# Patient Record
Sex: FEMALE | Race: WHITE | NOT HISPANIC OR LATINO | Employment: OTHER | URBAN - METROPOLITAN AREA
[De-identification: names, ages, dates, MRNs, and addresses within clinical notes are randomized per-mention and may not be internally consistent; named-entity substitution may affect disease eponyms.]

---

## 2017-01-17 ENCOUNTER — HOSPITAL ENCOUNTER (OUTPATIENT)
Dept: LAB | Facility: MEDICAL CENTER | Age: 77
End: 2017-01-17
Attending: INTERNAL MEDICINE
Payer: MEDICARE

## 2017-01-17 DIAGNOSIS — Z00.00 HEALTH CARE MAINTENANCE: ICD-10-CM

## 2017-01-17 DIAGNOSIS — E03.9 HYPOTHYROIDISM: Chronic | ICD-10-CM

## 2017-01-17 LAB
25(OH)D3 SERPL-MCNC: 33 NG/ML (ref 30–100)
ALBUMIN SERPL BCP-MCNC: 4.2 G/DL (ref 3.2–4.9)
ALBUMIN/GLOB SERPL: 1.5 G/DL
ALP SERPL-CCNC: 48 U/L (ref 30–99)
ALT SERPL-CCNC: 13 U/L (ref 2–50)
ANION GAP SERPL CALC-SCNC: 5 MMOL/L (ref 0–11.9)
AST SERPL-CCNC: 15 U/L (ref 12–45)
BASOPHILS # BLD AUTO: 0.03 K/UL (ref 0–0.12)
BASOPHILS NFR BLD AUTO: 0.6 % (ref 0–1.8)
BILIRUB SERPL-MCNC: 0.5 MG/DL (ref 0.1–1.5)
BUN SERPL-MCNC: 14 MG/DL (ref 8–22)
CALCIUM SERPL-MCNC: 9.9 MG/DL (ref 8.5–10.5)
CHLORIDE SERPL-SCNC: 104 MMOL/L (ref 96–112)
CHOLEST SERPL-MCNC: 215 MG/DL (ref 100–199)
CO2 SERPL-SCNC: 29 MMOL/L (ref 20–33)
CREAT SERPL-MCNC: 1.05 MG/DL (ref 0.5–1.4)
EOSINOPHIL # BLD: 0.12 K/UL (ref 0–0.51)
EOSINOPHIL NFR BLD AUTO: 2.5 % (ref 0–6.9)
ERYTHROCYTE [DISTWIDTH] IN BLOOD BY AUTOMATED COUNT: 45.2 FL (ref 35.9–50)
GLOBULIN SER CALC-MCNC: 2.8 G/DL (ref 1.9–3.5)
GLUCOSE SERPL-MCNC: 107 MG/DL (ref 65–99)
HCT VFR BLD AUTO: 45.2 % (ref 37–47)
HDLC SERPL-MCNC: 82 MG/DL
HGB BLD-MCNC: 14.4 G/DL (ref 12–16)
IMM GRANULOCYTES # BLD AUTO: 0.02 K/UL (ref 0–0.11)
IMM GRANULOCYTES NFR BLD AUTO: 0.4 % (ref 0–0.9)
LDLC SERPL CALC-MCNC: 120 MG/DL
LYMPHOCYTES # BLD: 1.15 K/UL (ref 1–4.8)
LYMPHOCYTES NFR BLD AUTO: 24.4 % (ref 22–41)
MCH RBC QN AUTO: 28.6 PG (ref 27–33)
MCHC RBC AUTO-ENTMCNC: 31.9 G/DL (ref 33.6–35)
MCV RBC AUTO: 89.7 FL (ref 81.4–97.8)
MONOCYTES # BLD: 0.31 K/UL (ref 0–0.85)
MONOCYTES NFR BLD AUTO: 6.6 % (ref 0–13.4)
NEUTROPHILS # BLD: 3.09 K/UL (ref 2–7.15)
NEUTROPHILS NFR BLD AUTO: 65.5 % (ref 44–72)
NRBC # BLD AUTO: 0 K/UL
NRBC BLD-RTO: 0 /100 WBC
PLATELET # BLD AUTO: 262 K/UL (ref 164–446)
PMV BLD AUTO: 10.1 FL (ref 9–12.9)
POTASSIUM SERPL-SCNC: 5.2 MMOL/L (ref 3.6–5.5)
PROT SERPL-MCNC: 7 G/DL (ref 6–8.2)
RBC # BLD AUTO: 5.04 M/UL (ref 4.2–5.4)
SODIUM SERPL-SCNC: 138 MMOL/L (ref 135–145)
TRIGL SERPL-MCNC: 64 MG/DL (ref 0–149)
TSH SERPL DL<=0.005 MIU/L-ACNC: 0.96 UIU/ML (ref 0.3–3.7)
WBC # BLD AUTO: 4.7 K/UL (ref 4.8–10.8)

## 2017-01-17 PROCEDURE — 84443 ASSAY THYROID STIM HORMONE: CPT

## 2017-01-17 PROCEDURE — 82306 VITAMIN D 25 HYDROXY: CPT

## 2017-01-17 PROCEDURE — 80061 LIPID PANEL: CPT

## 2017-01-17 PROCEDURE — 85025 COMPLETE CBC W/AUTO DIFF WBC: CPT

## 2017-01-17 PROCEDURE — 80053 COMPREHEN METABOLIC PANEL: CPT

## 2017-01-17 PROCEDURE — 36415 COLL VENOUS BLD VENIPUNCTURE: CPT

## 2017-01-18 ENCOUNTER — TELEPHONE (OUTPATIENT)
Dept: MEDICAL GROUP | Facility: PHYSICIAN GROUP | Age: 77
End: 2017-01-18

## 2017-01-18 NOTE — TELEPHONE ENCOUNTER
----- Message from Citlalli Lindsey M.D. sent at 1/18/2017  6:51 AM PST -----  Please let patient know her labs were reviewed by Dr. Lindsey. Her cholesterol is a little higher than last year and sugar was mildly elevated. Nothing critical. We can discuss it at her upcoming appointment.  Citlalli Lindsey M.D.

## 2017-01-18 NOTE — TELEPHONE ENCOUNTER
1. Caller Name: Bernie L Carlson                                         Call Back Number: [phone] (home)       Patient approves a detailed voicemail message: N\A    Sent letter to notify patient

## 2017-01-18 NOTE — Clinical Note
January 18, 2017        Bernie Barrientos  [address] [place] [address]        Dear Bernie    Here are the results of your test(s):   ----- Message from Citlalli Lindsey M.D. sent at 1/18/2017 6:51 AM PST -----   Please let patient know her labs were reviewed by Dr. Lindsey. Her cholesterol is a little higher than last year and sugar was mildly elevated. Nothing critical. We can discuss it at her upcoming appointment.   Citlalli Lindsey M.D        Sincerely,        Melida Baires  Signed Electronically

## 2017-01-27 ENCOUNTER — HOSPITAL ENCOUNTER (OUTPATIENT)
Dept: RADIOLOGY | Facility: MEDICAL CENTER | Age: 77
End: 2017-01-27
Attending: FAMILY MEDICINE
Payer: MEDICARE

## 2017-01-27 DIAGNOSIS — G89.29 CHRONIC PAIN OF RIGHT KNEE: ICD-10-CM

## 2017-01-27 DIAGNOSIS — M25.561 CHRONIC PAIN OF RIGHT KNEE: ICD-10-CM

## 2017-01-27 DIAGNOSIS — M54.50 ACUTE BILATERAL LOW BACK PAIN WITHOUT SCIATICA: ICD-10-CM

## 2017-01-27 PROCEDURE — 73562 X-RAY EXAM OF KNEE 3: CPT | Mod: RT

## 2017-01-27 PROCEDURE — 72100 X-RAY EXAM L-S SPINE 2/3 VWS: CPT

## 2017-01-30 ENCOUNTER — OFFICE VISIT (OUTPATIENT)
Dept: MEDICAL GROUP | Facility: PHYSICIAN GROUP | Age: 77
End: 2017-01-30
Payer: MEDICARE

## 2017-01-30 VITALS
RESPIRATION RATE: 12 BRPM | SYSTOLIC BLOOD PRESSURE: 140 MMHG | HEART RATE: 62 BPM | OXYGEN SATURATION: 95 % | WEIGHT: 154.1 LBS | TEMPERATURE: 97.5 F | HEIGHT: 69 IN | DIASTOLIC BLOOD PRESSURE: 74 MMHG | BODY MASS INDEX: 22.82 KG/M2

## 2017-01-30 DIAGNOSIS — R04.0 RIGHT-SIDED EPISTAXIS: ICD-10-CM

## 2017-01-30 DIAGNOSIS — M54.50 ACUTE BILATERAL LOW BACK PAIN WITHOUT SCIATICA: ICD-10-CM

## 2017-01-30 DIAGNOSIS — M17.11 OSTEOARTHRITIS OF RIGHT KNEE, UNSPECIFIED OSTEOARTHRITIS TYPE: ICD-10-CM

## 2017-01-30 PROCEDURE — G8419 CALC BMI OUT NRM PARAM NOF/U: HCPCS | Performed by: FAMILY MEDICINE

## 2017-01-30 PROCEDURE — 1036F TOBACCO NON-USER: CPT | Performed by: FAMILY MEDICINE

## 2017-01-30 PROCEDURE — 1101F PT FALLS ASSESS-DOCD LE1/YR: CPT | Performed by: FAMILY MEDICINE

## 2017-01-30 PROCEDURE — 4040F PNEUMOC VAC/ADMIN/RCVD: CPT | Performed by: FAMILY MEDICINE

## 2017-01-30 PROCEDURE — 99213 OFFICE O/P EST LOW 20 MIN: CPT | Mod: 25 | Performed by: FAMILY MEDICINE

## 2017-01-30 PROCEDURE — G8482 FLU IMMUNIZE ORDER/ADMIN: HCPCS | Performed by: FAMILY MEDICINE

## 2017-01-30 PROCEDURE — G8432 DEP SCR NOT DOC, RNG: HCPCS | Performed by: FAMILY MEDICINE

## 2017-01-30 PROCEDURE — 20610 DRAIN/INJ JOINT/BURSA W/O US: CPT | Performed by: FAMILY MEDICINE

## 2017-01-30 RX ORDER — LIDOCAINE HYDROCHLORIDE 20 MG/ML
5 INJECTION, SOLUTION EPIDURAL; INFILTRATION; INTRACAUDAL; PERINEURAL ONCE
Status: DISCONTINUED | OUTPATIENT
Start: 2017-01-30 | End: 2017-01-30 | Stop reason: HOSPADM

## 2017-01-30 RX ORDER — TRIAMCINOLONE ACETONIDE 40 MG/ML
80 INJECTION, SUSPENSION INTRA-ARTICULAR; INTRAMUSCULAR ONCE
Status: DISCONTINUED | OUTPATIENT
Start: 2017-01-30 | End: 2017-01-30 | Stop reason: HOSPADM

## 2017-01-30 NOTE — PROGRESS NOTES
"Subjective:   Bernie Barrientos is a 76 y.o. female here today for follow-up low back and right knee pain.    Acute bilateral low back pain without sciatica  Stable low back pain without significant change. She did notice improvement in her pain when she stopped walking due to the snow. No known injury or trauma. No leg symptoms. Denies fever, chills, groin numbness, bowel/bladder incontinence. We discussed her x-rays that show arthritis.    DJD (degenerative joint disease)  Stable, no changes. Her pain worsened when she saw \"[employer]\" and had to walk up multiple flights of stairs to her seat in the [facility]. She has had ongoing pain over the last several months in her right knee. No known injury or trauma. +Swelling. Painful to walk on. Patient is interested in a possible injection. She does not want surgery. We reviewed her x-rays that show arthritis.     Nosebleed  Patient reports that she has had a few episodes of nosebleed over the last several weeks. She has been using a Neti-Pot to help with a sinus infection. The bleeding is always from her right nostril. She has been able to stop that with pressure. Has not needed to go to the emergency room.    Current medicines (including changes today)  Current Outpatient Prescriptions   Medication Sig Dispense Refill   • Lactobacillus (PROBIOTIC ACIDOPHILUS PO) Take  by mouth.     • tizanidine (ZANAFLEX) 2 MG tablet Take 1 Tab by mouth at bedtime as needed (muscle spasm). 30 Tab 0   • levothyroxine (SYNTHROID) 100 MCG Tab Take 1 Tab by mouth every day. 90 Tab 3   • Multiple Vitamin (MULTI VITAMIN DAILY PO) Take  by mouth.     • Omega-3 Fatty Acids (OMEGA 3 PO) Take  by mouth.     • vitamin D (CHOLECALCIFEROL) 1000 UNIT Tab Take 1,000 Units by mouth every day.     • BIOTIN 5000 PO Take  by mouth.     • Magnesium 250 MG Tab Take  by mouth.     • Potassium 99 MG Tab Take  by mouth.     • aspirin 81 MG tablet Take 81 mg by mouth every day.     • Glucosamine HCl (GLUCOSAMINE PO) " "Take  by mouth.     • sertraline (ZOLOFT) 100 MG Tab Take 2 Tabs by mouth every day. 180 Tab 0     Current Facility-Administered Medications   Medication Dose Route Frequency Provider Last Rate Last Dose   • lidocaine PF (XYLOCAINE-MPF) 2 % injection PF 5 mL  5 mL Injection Once Citlalli Lindsey M.D.       • triamcinolone acetonide (KENALOG-40) injection 80 mg  80 mg Injection Once Citlalli Lindsey M.D.         She  has a past medical history of Colon polyps; Postmenopausal hormone replacement therapy; DJD (degenerative joint disease); Hypothyroidism; Migraine; Arthritis (2003); Heart valve disease (2005); Major depression (5/2/2013); Dental disorder; Anesthesia; and Colon polyp. She also has no past medical history of Backpain, COPD, Fall, or Breast cancer ([facility]).    ROS   See above. No chest pain, no shortness of breath, no abdominal pain.     Objective:     Physical Exam:  Blood pressure 140/74, pulse 62, temperature 36.4 °C (97.5 °F), resp. rate 12, height 1.753 m (5' 9.02\"), weight 69.9 kg (154 lb 1.6 oz), SpO2 95 %. Body mass index is 22.75 kg/(m^2).  Constitutional: Elderly female, alert, no distress.  Skin: Warm, dry, good turgor, no rashes in visible areas.  Eye: Conjunctiva clear, lids normal.  ENMT: Normal left nasal mucosa. Right nare with small abrasion on anterior aspect of septum. No current bleeding. Lips without lesions, good dentition, oropharynx clear.  Neck: Trachea midline, no masses, no thyromegaly.   Psych: Alert and oriented x3, normal affect and mood.    Spine: No significant spinal curvature on forward bend. Antalgic gait. Mild tenderness in right-sided paraspinal muscle. SLT negative. DTR 2+ patella, 1+ achilles bilaterally. Strength 5/5 proximal and distal LE.      Knees: Right knee mildly swollen when compared to left. Tenderness to palpation along joint line. Patellar grind test positive. Lachman's negative. Maite's negative. ROM intact. 5/5 strength bilaterally. 2+ deep tendon " reflexes bilaterally.    Assessment and Plan:     1. Acute bilateral low back pain without sciatica  Stable. Reviewed x-ray results with patient. I recommended physical therapy, but she declines at this time. Discussed home core exercises. I also provided her with local aquatic therapy information.    2. Osteoarthritis of right knee, unspecified osteoarthritis type  Stable. Patient does not wish to have surgery and is interested in a joint injection today. See procedure note below. We'll reevaluate in 4 months.    PROCEDURE NOTE:  Discussed risks and benefits and patient agrees to cortisone injection of right knee. The joint was prepped and draped in usual sterile fashion. A 25-guage needle was inserted into the anterior medial aspect of the joint. 2 cc of 2% lidocaine without epi and 2 cc of kenalog 40 was then injected into the joint. The area was cleaned with alcohol swab and band-aid was applied. Patient tolerated procedure well with no complications.    - Consent for Amb Surgery/Procedure  - lidocaine PF (XYLOCAINE-MPF) 2 % injection PF 5 mL; 5 mL by Injection route Once.  - triamcinolone acetonide (KENALOG-40) injection 80 mg; 2 mL by Injection route Once.    3. Right-sided epistaxis  This is a new problem. Mild episodes of epistaxis most likely due to local trauma from Neti-pot and dry climate. Advised patient to use humidifier and petroleum jelly.    Followup: Return in about 4 months (around 5/30/2017) for f/u right knee pain after injection, back pain, short.         PLEASE NOTE: This dictation was created using voice recognition software. I have made every reasonable attempt to correct obvious errors, but I expect that there are errors of grammar and possibly content that I did not discover before finalizing the note.

## 2017-01-30 NOTE — MR AVS SNAPSHOT
"        Bernie Barrientos   2017 10:40 AM   Office Visit   MRN: [id number]    Department:  [facility]   Dept Phone:  [phone]    Description:  Female : 1940   Provider:  Citlalli Lindsey M.D.           Reason for Visit     Follow-Up continued back and knee pain       Allergies as of 2017     Allergen Noted Reactions    Naprosyn [Naproxen] 2009   Rash    Pcn [Penicillins] 2009       Reaction when she was a child       You were diagnosed with     Acute bilateral low back pain without sciatica   [3729887]       Osteoarthritis of right knee, unspecified osteoarthritis type   [9282278]         Vital Signs     Blood Pressure Pulse Temperature Respirations Height Weight    140/74 mmHg 62 36.4 °C (97.5 °F) 12 1.753 m (5' 9.02\") 69.9 kg (154 lb 1.6 oz)    Body Mass Index Oxygen Saturation Smoking Status             22.75 kg/m2 95% Former Smoker         Basic Information     Date Of Birth Sex Race Ethnicity Preferred Language    1940 Female White Non- English      Your appointments     2017 12:30 PM   BONE DENSITY (DEXASCAN) with [facility] BD 1   [facility] ([address])    [address] [place] [id number] [phone]           No calcium supplements 24 hours prior to exam, including antacids or multivitamins w/calcium.  Pt may eat and drink normally.  No injection procedures prior to Dexa on the same day.  No barium contrast, no CTs with IV or oral contrast, no Pet/CTs and no Nuc Med injections for 7 days prior to a Dexa (including Barium Swallow, Upper GI, Small Bowel Series).  If scheduling a Dexa on the same day as a CT with IV or oral contrast, any test with barium, Pet/CT or a Nuc Med with injection, always schedule the Dexa before the other study.            2017  1:10 PM   MA SCRN10 with [facility] MG 3   [facility] ([address])    [address]  [facility] [id number] [phone]           No " deodorant, powder, perfume or lotion under the arm or breast area.            May 30, 2017 11:00 AM   Established Patient with Citlalli Lindsey M.D.   [facility] (--)    [address]  [address]  [place] [address]   [phone]           You will be receiving a confirmation call a few days before your appointment from our automated call confirmation system.              Problem List              ICD-10-CM Priority Class Noted - Resolved    DJD (degenerative joint disease) M19.90 Low  Unknown - Present    Colon polyps K63.5 Low  Unknown - Present    Thyroid activity decreased (Chronic) E03.9   11/30/2015 - Present    CKD (chronic kidney disease) N18.9   11/30/2015 - Present    S/P bilateral hip replacements Z96.643   11/30/2015 - Present    Major depressive disorder, recurrent, in remission (CMS-HCC) F33.40   9/15/2016 - Present    Atypical mole D22.9   12/29/2016 - Present    Acute bilateral low back pain without sciatica M54.5   12/29/2016 - Present      Health Maintenance        Date Due Completion Dates    BONE DENSITY 2/17/2016 2/17/2011    IMM ZOSTER VACCINE 9/7/2017 (Originally 9/7/1999) ---    COLONOSCOPY 9/11/2024 9/11/2014 (N/S), 8/15/2013, 7/2/2013 (N/S)    Override on 9/11/2014: (N/S)    Override on 7/2/2013: (N/S)    IMM DTaP/Tdap/Td Vaccine (2 - Td) 12/4/2024 12/4/2014            Current Immunizations     13-VALENT PCV PREVNAR 9/15/2016    INFLUENZA VACCINE H1N1 1/2/2010    Influenza TIV (IM) 10/22/2014, 10/22/2014, 10/11/2013, 10/17/2012    Influenza Vaccine 10/21/2009    Influenza Vaccine Adult HD 10/31/2016    Influenza Vaccine Quad Inj (Pf) 10/15/2012    Pneumococcal Vaccine (UF)Historical Data 11/21/2007    Pneumococcal polysaccharide vaccine (PPSV-23) 10/22/2014    Tdap Vaccine 12/4/2014      Below and/or attached are the medications your provider expects you to take. Review all of your home medications and newly ordered medications with your provider and/or pharmacist. Follow  medication instructions as directed by your provider and/or pharmacist. Please keep your medication list with you and share with your provider. Update the information when medications are discontinued, doses are changed, or new medications (including over-the-counter products) are added; and carry medication information at all times in the event of emergency situations     Allergies:  NAPROSYN - Rash     PCN - (reactions not documented)               Medications  Valid as of: January 30, 2017 - 11:03 AM    Generic Name Brand Name Tablet Size Instructions for use    Aspirin (Tab) aspirin 81 MG Take 81 mg by mouth every day.        Biotin   Take  by mouth.        Cholecalciferol (Tab) cholecalciferol 1000 UNIT Take 1,000 Units by mouth every day.        Glucosamine HCl   Take  by mouth.        Lactobacillus   Take  by mouth.        Levothyroxine Sodium (Tab) SYNTHROID 100 MCG Take 1 Tab by mouth every day.        Magnesium (Tab) Magnesium 250 MG Take  by mouth.        Multiple Vitamin   Take  by mouth.        Omega-3 Fatty Acids   Take  by mouth.        Potassium (Tab) Potassium 99 MG Take  by mouth.        Sertraline HCl (Tab) ZOLOFT 100 MG Take 2 Tabs by mouth every day.        TiZANidine HCl (Tab) ZANAFLEX 2 MG Take 1 Tab by mouth at bedtime as needed (muscle spasm).        .                 Medicines prescribed today were sent to:     [facility] [address] [place] [address] (), [place] - [id number] () [place] [address]    Phone: [phone] Fax: [phone]    Open 24 Hours?: No      Medication refill instructions:       If your prescription bottle indicates you have medication refills left, it is not necessary to call your provider’s office. Please contact your pharmacy and they will refill your medication.    If your prescription bottle indicates you do not have any refills left, you may request refills at any time through one of the following ways: The online [employer] system (except Urgent  Care), by calling your provider’s office, or by asking your pharmacy to contact your provider’s office with a refill request. Medication refills are processed only during regular business hours and may not be available until the next business day. Your provider may request additional information or to have a follow-up visit with you prior to refilling your medication.   *Please Note: Medication refills are assigned a new Rx number when refilled electronically. Your pharmacy may indicate that no refills were authorized even though a new prescription for the same medication is available at the pharmacy. Please request the medicine by name with the pharmacy before contacting your provider for a refill.        Instructions    Knee Injection, Care After  Refer to this sheet in the next few weeks. These instructions provide you with information about caring for yourself after your procedure. Your health care provider may also give you more specific instructions. Your treatment has been planned according to current medical practices, but problems sometimes occur. Call your health care provider if you have any problems or questions after your procedure.  WHAT TO EXPECT AFTER THE PROCEDURE  After your procedure, it is common to have:  · Soreness.  · Warmth.  · Swelling.  You may have more pain, swelling, and warmth than you did before the injection. This reaction may last for about one day.   HOME CARE INSTRUCTIONS  Bathing  · If you were given a bandage (dressing), keep it dry until your health care provider says it can be removed. Ask your health care provider when you can start showering or taking a bath.   Managing Pain, Stiffness, and Swelling  · If directed, apply ice to the injection area:  ¨ Put ice in a plastic bag.  ¨ Place a towel between your skin and the bag.  ¨ Leave the ice on for 20 minutes, 2-3 times per day.  · Do not apply heat to your knee.  · Raise the injection area above the level of your heart while  you are sitting or lying down.  Activity  · Avoid strenuous activities for as long as directed by your health care provider. Ask your health care provider when you can return to your normal activities.   General Instructions  · Take medicines only as directed by your health care provider.  · Do not take aspirin or other over-the-counter medicines unless your health care provider says you can.  · Check your injection site every day for signs of infection. Watch for:  ¨ Redness, swelling, or pain.  ¨ Fluid, blood, or pus.  · Follow your health care provider's instructions about dressing changes and removal.  SEEK MEDICAL CARE IF:  · You have symptoms at your injection site that last longer than two days after your procedure.  · You have redness, swelling, or pain in your injection area.  · You have fluid, blood, or pus coming from your injection site.  · You have warmth in your injection area.  · You have a fever.  · Your pain is not controlled with medicine.  SEEK IMMEDIATE MEDICAL CARE IF:  · Your knee turns very red.  · Your knee becomes very swollen.  · Your knee pain is severe.     This information is not intended to replace advice given to you by your health care provider. Make sure you discuss any questions you have with your health care provider.     Document Released: 01/08/2016 Document Reviewed: 01/08/2016  [employer] Interactive Patient Education ©2016 [employer]            Push IOt Access Code: Activation code not generated  Current [employer] Status: Active

## 2017-01-30 NOTE — ASSESSMENT & PLAN NOTE
"Stable, no changes. Her pain worsened when she saw \"[employer]\" and had to walk up multiple flights of stairs to her seat in the [facility]. She has had ongoing pain over the last several months in her right knee. No known injury or trauma. +Swelling. Painful to walk on. Patient is interested in a possible injection. She does not want surgery. We reviewed her x-rays that show arthritis.  "

## 2017-01-30 NOTE — ASSESSMENT & PLAN NOTE
Stable low back pain without significant change. She did notice improvement in her pain when she stopped walking due to the snow. No known injury or trauma. No leg symptoms. Denies fever, chills, groin numbness, bowel/bladder incontinence. We discussed her x-rays that show arthritis.

## 2017-01-30 NOTE — PATIENT INSTRUCTIONS
Knee Injection, Care After  Refer to this sheet in the next few weeks. These instructions provide you with information about caring for yourself after your procedure. Your health care provider may also give you more specific instructions. Your treatment has been planned according to current medical practices, but problems sometimes occur. Call your health care provider if you have any problems or questions after your procedure.  WHAT TO EXPECT AFTER THE PROCEDURE  After your procedure, it is common to have:  · Soreness.  · Warmth.  · Swelling.  You may have more pain, swelling, and warmth than you did before the injection. This reaction may last for about one day.   HOME CARE INSTRUCTIONS  Bathing  · If you were given a bandage (dressing), keep it dry until your health care provider says it can be removed. Ask your health care provider when you can start showering or taking a bath.   Managing Pain, Stiffness, and Swelling  · If directed, apply ice to the injection area:  ¨ Put ice in a plastic bag.  ¨ Place a towel between your skin and the bag.  ¨ Leave the ice on for 20 minutes, 2-3 times per day.  · Do not apply heat to your knee.  · Raise the injection area above the level of your heart while you are sitting or lying down.  Activity  · Avoid strenuous activities for as long as directed by your health care provider. Ask your health care provider when you can return to your normal activities.   General Instructions  · Take medicines only as directed by your health care provider.  · Do not take aspirin or other over-the-counter medicines unless your health care provider says you can.  · Check your injection site every day for signs of infection. Watch for:  ¨ Redness, swelling, or pain.  ¨ Fluid, blood, or pus.  · Follow your health care provider's instructions about dressing changes and removal.  SEEK MEDICAL CARE IF:  · You have symptoms at your injection site that last longer than two days after your  procedure.  · You have redness, swelling, or pain in your injection area.  · You have fluid, blood, or pus coming from your injection site.  · You have warmth in your injection area.  · You have a fever.  · Your pain is not controlled with medicine.  SEEK IMMEDIATE MEDICAL CARE IF:  · Your knee turns very red.  · Your knee becomes very swollen.  · Your knee pain is severe.     This information is not intended to replace advice given to you by your health care provider. Make sure you discuss any questions you have with your health care provider.     Document Released: 01/08/2016 Document Reviewed: 01/08/2016  [employer] Interactive Patient Education ©2016 [employer]

## 2017-02-21 ENCOUNTER — HOSPITAL ENCOUNTER (OUTPATIENT)
Dept: RADIOLOGY | Facility: MEDICAL CENTER | Age: 77
End: 2017-02-21
Attending: FAMILY MEDICINE
Payer: MEDICARE

## 2017-02-21 DIAGNOSIS — F33.0 MAJOR DEPRESSIVE DISORDER, RECURRENT EPISODE, MILD (HCC): ICD-10-CM

## 2017-02-21 DIAGNOSIS — Z78.0 POSTMENOPAUSAL: ICD-10-CM

## 2017-02-21 PROCEDURE — 77063 BREAST TOMOSYNTHESIS BI: CPT

## 2017-02-21 PROCEDURE — 77080 DXA BONE DENSITY AXIAL: CPT

## 2017-02-21 RX ORDER — SERTRALINE HYDROCHLORIDE 100 MG/1
200 TABLET, FILM COATED ORAL DAILY
Qty: 180 TAB | Refills: 3 | Status: SHIPPED | OUTPATIENT
Start: 2017-02-21 | End: 2018-03-12 | Stop reason: SDUPTHER

## 2017-06-02 ENCOUNTER — OFFICE VISIT (OUTPATIENT)
Dept: MEDICAL GROUP | Facility: PHYSICIAN GROUP | Age: 77
End: 2017-06-02
Payer: MEDICARE

## 2017-06-02 VITALS
SYSTOLIC BLOOD PRESSURE: 122 MMHG | TEMPERATURE: 99.7 F | OXYGEN SATURATION: 92 % | HEART RATE: 72 BPM | RESPIRATION RATE: 12 BRPM | DIASTOLIC BLOOD PRESSURE: 56 MMHG | BODY MASS INDEX: 23.71 KG/M2 | WEIGHT: 160.05 LBS | HEIGHT: 69 IN

## 2017-06-02 DIAGNOSIS — M54.50 ACUTE BILATERAL LOW BACK PAIN WITHOUT SCIATICA: ICD-10-CM

## 2017-06-02 DIAGNOSIS — R73.01 ELEVATED FASTING GLUCOSE: ICD-10-CM

## 2017-06-02 DIAGNOSIS — E78.00 PURE HYPERCHOLESTEROLEMIA: ICD-10-CM

## 2017-06-02 DIAGNOSIS — M17.11 OSTEOARTHRITIS OF RIGHT KNEE, UNSPECIFIED OSTEOARTHRITIS TYPE: ICD-10-CM

## 2017-06-02 DIAGNOSIS — E03.9 ACQUIRED HYPOTHYROIDISM: ICD-10-CM

## 2017-06-02 DIAGNOSIS — J30.1 SEASONAL ALLERGIC RHINITIS DUE TO POLLEN: ICD-10-CM

## 2017-06-02 PROCEDURE — 1101F PT FALLS ASSESS-DOCD LE1/YR: CPT | Performed by: FAMILY MEDICINE

## 2017-06-02 PROCEDURE — 1036F TOBACCO NON-USER: CPT | Performed by: FAMILY MEDICINE

## 2017-06-02 PROCEDURE — G8420 CALC BMI NORM PARAMETERS: HCPCS | Performed by: FAMILY MEDICINE

## 2017-06-02 PROCEDURE — 4040F PNEUMOC VAC/ADMIN/RCVD: CPT | Performed by: FAMILY MEDICINE

## 2017-06-02 PROCEDURE — G8432 DEP SCR NOT DOC, RNG: HCPCS | Performed by: FAMILY MEDICINE

## 2017-06-02 PROCEDURE — 99213 OFFICE O/P EST LOW 20 MIN: CPT | Performed by: FAMILY MEDICINE

## 2017-06-02 NOTE — PROGRESS NOTES
Subjective:   Bernie Barrientos is a 76 y.o. female here today for follow-up knee and back pain.    DJD (degenerative joint disease)  Stable. Pain improved with glucosamine and collagen. Not keeping her from her daily activities.    Acute bilateral low back pain without sciatica  Stable. Pain controlled with OTC pain relievers. No new injury or trauma.    Seasonal allergic rhinitis due to pollen  Has been feeling more tired lately. Noticing nasal congestion, sore throat, cough and phlegm in the back of her throat. No blood in stool or snoring.     Current medicines (including changes today)  Current Outpatient Prescriptions   Medication Sig Dispense Refill   • Collagen 500 MG Cap Take 500 mg by mouth every day.     • sertraline (ZOLOFT) 100 MG Tab Take 2 Tabs by mouth every day. 180 Tab 3   • Lactobacillus (PROBIOTIC ACIDOPHILUS PO) Take  by mouth.     • tizanidine (ZANAFLEX) 2 MG tablet Take 1 Tab by mouth at bedtime as needed (muscle spasm). 30 Tab 0   • levothyroxine (SYNTHROID) 100 MCG Tab Take 1 Tab by mouth every day. 90 Tab 3   • Multiple Vitamin (MULTI VITAMIN DAILY PO) Take  by mouth.     • Omega-3 Fatty Acids (OMEGA 3 PO) Take  by mouth.     • vitamin D (CHOLECALCIFEROL) 1000 UNIT Tab Take 1,000 Units by mouth every day.     • BIOTIN 5000 PO Take  by mouth.     • Magnesium 250 MG Tab Take  by mouth.     • Potassium 99 MG Tab Take  by mouth.     • aspirin 81 MG tablet Take 81 mg by mouth every day.     • Glucosamine HCl (GLUCOSAMINE PO) Take  by mouth.       No current facility-administered medications for this visit.     She  has a past medical history of Colon polyps; Postmenopausal hormone replacement therapy; DJD (degenerative joint disease); Hypothyroidism; Migraine; Arthritis (2003); Heart valve disease (2005); Major depression (CMS-HCC) (5/2/2013); Dental disorder; Anesthesia; and Colon polyp. She also has no past medical history of Backpain, COPD, Fall, or Breast cancer (CMS-[facility]).    ROS   See HPI. No  "chest pain, no shortness of breath, no abdominal pain.     Objective:     Physical Exam:  Blood pressure 122/56, pulse 72, temperature 37.6 °C (99.7 °F), resp. rate 12, height 1.753 m (5' 9\"), weight 72.6 kg (160 lb 0.9 oz), SpO2 92 %. Body mass index is 23.63 kg/(m^2).   Constitutional: Alert, no distress.  Skin: Warm, dry, good turgor, no rashes in visible areas.  Eye: Equal, round and reactive, conjunctiva clear, lids normal.  ENMT: TM's clear bilaterally, lips without lesions, good dentition, oropharynx clear.  Neck: Trachea midline, no masses, no thyromegaly. No cervical or supraclavicular lymphadenopathy.  Respiratory: Unlabored respiratory effort, no cough.  Psych: Alert and oriented x3, normal affect and mood.    Spine: No significant spinal curvature on forward bend. Normal gait. Mild tenderness in right-sided paraspinal muscle. Strength 5/5 proximal and distal LE.      Knees: Right knee mildly swollen when compared to left. No tenderness to palpation along joint line. ROM intact. 5/5 strength bilaterally. 2+ deep tendon reflexes bilaterally.    Assessment and Plan:     1. Osteoarthritis of right knee, unspecified osteoarthritis type  Chronic and stable. Pain and function are reasonably well-controlled with over-the-counter supplements. Continue to monitor.    2. Acute bilateral low back pain without sciatica  Chronic and stable. Pain and function are reasonably well-controlled with over-the-counter pain relievers. Continue to monitor.    3. Seasonal allergic rhinitis due to pollen  Advised nasal saline and steroid sprays daily. OTC anti-histamines (Zyrtec) as needed. Encouraged allergen avoidence and environment modification when possible. If regular use not effective, may consider referral to allergist for immunotherapy.     4. Elevated fasting glucose  Per patient history. Fasting glucose ordered.  - COMP METABOLIC PANEL; Future    5. Acquired hypothyroidism  Continue thyroid medication. Instructed " patient to take on empty stomach with glass of water, 30 minutes prior to food or other medications. Labs as indicated.  - TSH; Future    6. Pure hypercholesterolemia  Well controlled with diet. Labs as indicated. Continue to monitor.  - LIPID PROFILE; Future    Followup: Return in about 7 months (around 1/2/2018) for f/u labs, routine check-up, short.         PLEASE NOTE: This dictation was created using voice recognition software. I have made every reasonable attempt to correct obvious errors, but I expect that there are errors of grammar and possibly content that I did not discover before finalizing the note.

## 2017-06-02 NOTE — ASSESSMENT & PLAN NOTE
Has been feeling more tired lately. Noticing nasal congestion, sore throat, cough and phlegm in the back of her throat. No blood in stool or snoring.

## 2017-06-02 NOTE — MR AVS SNAPSHOT
"        Bernie Barrientos   2017 1:40 PM   Office Visit   MRN: [id number]    Department:  [facility]   Dept Phone:  [phone]    Description:  Female : 1940   Provider:  Citlalli Lindsey M.D.           Reason for Visit     Knee Pain right knee, improved over the last week    Back Pain lower back      Allergies as of 2017     Allergen Noted Reactions    Naprosyn [Naproxen] 2009   Rash    Pcn [Penicillins] 2009       Reaction when she was a child       You were diagnosed with     Osteoarthritis of right knee, unspecified osteoarthritis type   [4139302]       Acute bilateral low back pain without sciatica   [2022767]       Seasonal allergic rhinitis due to pollen   [9622975]       Elevated fasting glucose   [302913]       Acquired hypothyroidism   [7253391]       Pure hypercholesterolemia   [272.0.ICD-9-CM]         Vital Signs     Blood Pressure Pulse Temperature Respirations Height Weight    122/56 mmHg 72 37.6 °C (99.7 °F) 12 1.753 m (5' 9\") 72.6 kg (160 lb 0.9 oz)    Body Mass Index Oxygen Saturation Smoking Status             23.63 kg/m2 92% Former Smoker         Basic Information     Date Of Birth Sex Race Ethnicity Preferred Language    1940 Female White Non- English      Your appointments     Colin 15, 2018  2:00 PM   Established Patient with Citlalli Linsdey M.D.   [facility] (--)    [address]  [address]  [facility] [id number] [phone]           You will be receiving a confirmation call a few days before your appointment from our automated call confirmation system.              Problem List              ICD-10-CM Priority Class Noted - Resolved    DJD (degenerative joint disease) M19.90 Low  Unknown - Present    Colon polyps K63.5 Low  Unknown - Present    Acquired hypothyroidism E03.9   2015 - Present    CKD (chronic kidney disease) N18.9   2015 - Present    S/P bilateral hip replacements Z96.643   2015 - Present    Major depressive " disorder, recurrent, in remission (CMS-[facility]) F33.40   9/15/2016 - Present    Atypical mole D22.9   12/29/2016 - Present    Acute bilateral low back pain without sciatica M54.5   12/29/2016 - Present    Seasonal allergic rhinitis due to pollen J30.1   6/2/2017 - Present    Elevated fasting glucose R73.01   6/2/2017 - Present    Pure hypercholesterolemia E78.00   6/2/2017 - Present      Health Maintenance        Date Due Completion Dates    IMM ZOSTER VACCINE 9/7/2017 (Originally 9/7/1999) ---    BONE DENSITY 2/21/2022 2/21/2017, 2/17/2011    COLONOSCOPY 9/11/2024 9/11/2014 (N/S), 8/15/2013, 7/2/2013 (N/S)    Override on 9/11/2014: (N/S)    Override on 7/2/2013: (N/S)    IMM DTaP/Tdap/Td Vaccine (2 - Td) 12/4/2024 12/4/2014            Current Immunizations     13-VALENT PCV PREVNAR 9/15/2016    INFLUENZA VACCINE H1N1 1/2/2010    Influenza TIV (IM) 10/22/2014, 10/22/2014, 10/11/2013, 10/17/2012    Influenza Vaccine 10/21/2009    Influenza Vaccine Adult HD 10/31/2016    Influenza Vaccine Quad Inj (Pf) 10/15/2012    Pneumococcal Vaccine (UF)Historical Data 11/21/2007    Pneumococcal polysaccharide vaccine (PPSV-23) 10/22/2014    Tdap Vaccine 12/4/2014      Below and/or attached are the medications your provider expects you to take. Review all of your home medications and newly ordered medications with your provider and/or pharmacist. Follow medication instructions as directed by your provider and/or pharmacist. Please keep your medication list with you and share with your provider. Update the information when medications are discontinued, doses are changed, or new medications (including over-the-counter products) are added; and carry medication information at all times in the event of emergency situations     Allergies:  NAPROSYN - Rash     PCN - (reactions not documented)               Medications  Valid as of: June 02, 2017 -  1:59 PM    Generic Name Brand Name Tablet Size Instructions for use    Aspirin (Tab) aspirin  81 MG Take 81 mg by mouth every day.        Biotin   Take  by mouth.        Cholecalciferol (Tab) cholecalciferol 1000 UNIT Take 1,000 Units by mouth every day.        Collagen (Cap) Collagen 500 MG Take 500 mg by mouth every day.        Glucosamine HCl   Take  by mouth.        Lactobacillus   Take  by mouth.        Levothyroxine Sodium (Tab) SYNTHROID 100 MCG Take 1 Tab by mouth every day.        Magnesium (Tab) Magnesium 250 MG Take  by mouth.        Multiple Vitamin   Take  by mouth.        Omega-3 Fatty Acids   Take  by mouth.        Potassium (Tab) Potassium 99 MG Take  by mouth.        Sertraline HCl (Tab) ZOLOFT 100 MG Take 2 Tabs by mouth every day.        TiZANidine HCl (Tab) ZANAFLEX 2 MG Take 1 Tab by mouth at bedtime as needed (muscle spasm).        .                 Medicines prescribed today were sent to:     [facility] [address] [place] [address] (), [place] - [id number] () [place] [address]    Phone: [phone] Fax: [phone]    Open 24 Hours?: No      Medication refill instructions:       If your prescription bottle indicates you have medication refills left, it is not necessary to call your provider’s office. Please contact your pharmacy and they will refill your medication.    If your prescription bottle indicates you do not have any refills left, you may request refills at any time through one of the following ways: The online [employer] system (except Urgent Care), by calling your provider’s office, or by asking your pharmacy to contact your provider’s office with a refill request. Medication refills are processed only during regular business hours and may not be available until the next business day. Your provider may request additional information or to have a follow-up visit with you prior to refilling your medication.   *Please Note: Medication refills are assigned a new Rx number when refilled electronically. Your pharmacy may indicate that no refills were authorized  even though a new prescription for the same medication is available at the pharmacy. Please request the medicine by name with the pharmacy before contacting your provider for a refill.        Your To Do List     Future Labs/Procedures Complete By Expires    COMP METABOLIC PANEL  As directed 6/3/2018    LIPID PROFILE  As directed 6/3/2018    TSH  As directed 6/3/2018         MyChart Access Code: Activation code not generated  Current PublikDemandt Status: Active

## 2017-07-03 ENCOUNTER — OFFICE VISIT (OUTPATIENT)
Dept: MEDICAL GROUP | Facility: PHYSICIAN GROUP | Age: 77
End: 2017-07-03
Payer: MEDICARE

## 2017-07-03 VITALS
BODY MASS INDEX: 23.51 KG/M2 | SYSTOLIC BLOOD PRESSURE: 108 MMHG | DIASTOLIC BLOOD PRESSURE: 62 MMHG | OXYGEN SATURATION: 95 % | TEMPERATURE: 98.6 F | HEART RATE: 64 BPM | WEIGHT: 158.73 LBS | RESPIRATION RATE: 12 BRPM | HEIGHT: 69 IN

## 2017-07-03 DIAGNOSIS — R06.02 SHORTNESS OF BREATH: ICD-10-CM

## 2017-07-03 DIAGNOSIS — F33.40 MAJOR DEPRESSIVE DISORDER, RECURRENT, IN REMISSION (HCC): ICD-10-CM

## 2017-07-03 DIAGNOSIS — R05.8 PRODUCTIVE COUGH: ICD-10-CM

## 2017-07-03 DIAGNOSIS — J22 ACUTE RESPIRATORY INFECTION: ICD-10-CM

## 2017-07-03 PROCEDURE — 99214 OFFICE O/P EST MOD 30 MIN: CPT | Performed by: FAMILY MEDICINE

## 2017-07-03 RX ORDER — LEVOFLOXACIN 500 MG/1
500 TABLET, FILM COATED ORAL DAILY
Qty: 5 TAB | Refills: 0 | Status: SHIPPED | OUTPATIENT
Start: 2017-07-03 | End: 2017-09-14

## 2017-07-03 RX ORDER — AZITHROMYCIN 250 MG/1
TABLET, FILM COATED ORAL
Qty: 6 TAB | Refills: 0 | Status: SHIPPED | OUTPATIENT
Start: 2017-07-03 | End: 2017-07-03

## 2017-07-03 RX ORDER — CODEINE PHOSPHATE/GUAIFENESIN 10-100MG/5
5 LIQUID (ML) ORAL 3 TIMES DAILY PRN
Qty: 120 ML | Refills: 0 | Status: SHIPPED
Start: 2017-07-03 | End: 2017-09-14

## 2017-07-03 RX ORDER — IPRATROPIUM BROMIDE AND ALBUTEROL SULFATE 2.5; .5 MG/3ML; MG/3ML
3 SOLUTION RESPIRATORY (INHALATION) ONCE
Status: COMPLETED | OUTPATIENT
Start: 2017-07-03 | End: 2017-07-03

## 2017-07-03 RX ADMIN — IPRATROPIUM BROMIDE AND ALBUTEROL SULFATE 3 ML: 2.5; .5 SOLUTION RESPIRATORY (INHALATION) at 15:16

## 2017-07-03 NOTE — ASSESSMENT & PLAN NOTE
Patient has had a productive cough for the last month. The sputum was pink-colored this morning which concerned her and prompted her to make an appointment. Only coughing up a drop or two at a time. She shows me a tissue that has one drop of brown-colored mucus.    Associated symptoms include fatigue, shortness of breath and muscle weakness. No fever or chills. Poor appetite, but tolerating PO. Having difficulty sleeping at night due to cough.    Former smoker. Has not smoked in several years. Weight is stable.

## 2017-07-03 NOTE — MR AVS SNAPSHOT
"        Bernie Barrientos   7/3/2017 3:00 PM   Office Visit   MRN: [id number]    Department:  [facility]   Dept Phone:  [phone]    Description:  Female : 1940   Provider:  Citlalli Lindsey M.D.           Reason for Visit     Cough d1ikwgn, productive     Shortness of Breath x3days    Extremity Weakness achy legs, x3days, worse today    Fatigue x3days       Allergies as of 7/3/2017     Allergen Noted Reactions    Naprosyn [Naproxen] 2009   Rash    Pcn [Penicillins] 2009       Reaction when she was a child       You were diagnosed with     Acute respiratory infection   [146346]       Productive cough   [364746]       Shortness of breath   [786.05.ICD-9-CM]       Major depressive disorder, recurrent, in remission (CMS-HCC)   [945182]         Vital Signs     Blood Pressure Pulse Temperature Respirations Height Weight    108/62 mmHg 64 37 °C (98.6 °F) 12 1.753 m (5' 9\") 72 kg (158 lb 11.7 oz)    Body Mass Index Oxygen Saturation Smoking Status             23.43 kg/m2 95% Former Smoker         Basic Information     Date Of Birth Sex Race Ethnicity Preferred Language    1940 Female White Non- English      Your appointments     Colin 15, 2018  2:00 PM   Established Patient with Citlalli Lindsey M.D.   [facility] (--)    [address]  [address]  [facility] [id number] [phone]           You will be receiving a confirmation call a few days before your appointment from our automated call confirmation system.              Problem List              ICD-10-CM Priority Class Noted - Resolved    DJD (degenerative joint disease) M19.90 Low  Unknown - Present    Colon polyps K63.5 Low  Unknown - Present    Acquired hypothyroidism E03.9   2015 - Present    CKD (chronic kidney disease) N18.9   2015 - Present    S/P bilateral hip replacements Z96.643   2015 - Present    Major depressive disorder, recurrent, in remission (CMS-HCC) F33.40   9/15/2016 - Present    Atypical " mole D22.9   12/29/2016 - Present    Acute bilateral low back pain without sciatica M54.5   12/29/2016 - Present    Seasonal allergic rhinitis due to pollen J30.1   6/2/2017 - Present    Elevated fasting glucose R73.01   6/2/2017 - Present    Pure hypercholesterolemia E78.00   6/2/2017 - Present    Productive cough R05   7/3/2017 - Present      Health Maintenance        Date Due Completion Dates    IMM ZOSTER VACCINE 9/7/2017 (Originally 9/7/1999) ---    IMM INFLUENZA (1) 9/1/2017 10/31/2016, 10/22/2014, 10/11/2013, 10/17/2012, 10/15/2012    BONE DENSITY 2/21/2022 2/21/2017, 2/17/2011    COLONOSCOPY 9/11/2024 9/11/2014 (N/S), 8/15/2013, 7/2/2013 (N/S)    Override on 9/11/2014: (N/S)    Override on 7/2/2013: (N/S)    IMM DTaP/Tdap/Td Vaccine (2 - Td) 12/4/2024 12/4/2014            Current Immunizations     13-VALENT PCV PREVNAR 9/15/2016    INFLUENZA VACCINE H1N1 1/2/2010    Influenza TIV (IM) 10/22/2014, 10/22/2014, 10/11/2013, 10/17/2012    Influenza Vaccine 10/21/2009    Influenza Vaccine Adult HD 10/31/2016    Influenza Vaccine Quad Inj (Pf) 10/15/2012    Pneumococcal Vaccine (UF)Historical Data 11/21/2007    Pneumococcal polysaccharide vaccine (PPSV-23) 10/22/2014    Tdap Vaccine 12/4/2014      Below and/or attached are the medications your provider expects you to take. Review all of your home medications and newly ordered medications with your provider and/or pharmacist. Follow medication instructions as directed by your provider and/or pharmacist. Please keep your medication list with you and share with your provider. Update the information when medications are discontinued, doses are changed, or new medications (including over-the-counter products) are added; and carry medication information at all times in the event of emergency situations     Allergies:  NAPROSYN - Rash     PCN - (reactions not documented)               Medications  Valid as of: July 03, 2017 -  3:18 PM    Generic Name Brand Name Tablet  Size Instructions for use    Aspirin (Tab) aspirin 81 MG Take 81 mg by mouth every day.        Azithromycin (Tab) ZITHROMAX 250 MG Take 2 tablets PO on day #1, then 1 tablet PO daily on days #2-5.        Biotin   Take  by mouth.        Cholecalciferol (Tab) cholecalciferol 1000 UNIT Take 1,000 Units by mouth every day.        Collagen (Cap) Collagen 500 MG Take 500 mg by mouth every day.        Glucosamine HCl   Take  by mouth.        Guaifenesin-Codeine (Syrup) TUSSI-ORGANIDIN -10 MG/5ML Take 5 mL by mouth 3 times a day as needed for Cough.        Lactobacillus   Take  by mouth.        Levothyroxine Sodium (Tab) SYNTHROID 100 MCG Take 1 Tab by mouth every day.        Magnesium (Tab) Magnesium 250 MG Take  by mouth.        Multiple Vitamin   Take  by mouth.        Omega-3 Fatty Acids   Take  by mouth.        Potassium (Tab) Potassium 99 MG Take  by mouth.        Sertraline HCl (Tab) ZOLOFT 100 MG Take 2 Tabs by mouth every day.        TiZANidine HCl (Tab) ZANAFLEX 2 MG Take 1 Tab by mouth at bedtime as needed (muscle spasm).        .                 Medicines prescribed today were sent to:     [facility] [address] [place] [address] (), [place] - [address]    [address] () [place] [address]    Phone: [phone] Fax: [phone]    Open 24 Hours?: No      Medication refill instructions:       If your prescription bottle indicates you have medication refills left, it is not necessary to call your provider’s office. Please contact your pharmacy and they will refill your medication.    If your prescription bottle indicates you do not have any refills left, you may request refills at any time through one of the following ways: The online [employer] system (except Urgent Care), by calling your provider’s office, or by asking your pharmacy to contact your provider’s office with a refill request. Medication refills are processed only during regular business hours and may not be available until the next business  day. Your provider may request additional information or to have a follow-up visit with you prior to refilling your medication.   *Please Note: Medication refills are assigned a new Rx number when refilled electronically. Your pharmacy may indicate that no refills were authorized even though a new prescription for the same medication is available at the pharmacy. Please request the medicine by name with the pharmacy before contacting your provider for a refill.        Your To Do List     Future Labs/Procedures Complete By Expires    DX-CHEST-2 VIEWS  As directed 1/3/2018         mPura Access Code: Activation code not generated  Current mPura Status: Active

## 2017-07-03 NOTE — PROGRESS NOTES
Subjective:   Bernie Barrientos is a 76 y.o. female here today for cough.    Productive cough  Patient has had a productive cough for the last month. The sputum was pink-colored this morning which concerned her and prompted her to make an appointment. Only coughing up a drop or two at a time. She shows me a tissue that has one drop of brown-colored mucus.    Associated symptoms include fatigue, shortness of breath and muscle weakness. No fever or chills. Poor appetite, but tolerating PO. Having difficulty sleeping at night due to cough.    Former smoker. Has not smoked in several years. Weight is stable.    Major depressive disorder, recurrent, in remission  Mood improved with current medication and therapy. Taking medications as prescribed without side effects. Patient denies SI/HI.     Current medicines (including changes today)  Current Outpatient Prescriptions   Medication Sig Dispense Refill   • azithromycin (ZITHROMAX) 250 MG Tab Take 2 tablets PO on day #1, then 1 tablet PO daily on days #2-5. 6 Tab 0   • guaifenesin-codeine (TUSSI-ORGANIDIN NR) 100-10 MG/5ML syrup Take 5 mL by mouth 3 times a day as needed for Cough. 120 mL 0   • Collagen 500 MG Cap Take 500 mg by mouth every day.     • sertraline (ZOLOFT) 100 MG Tab Take 2 Tabs by mouth every day. 180 Tab 3   • Lactobacillus (PROBIOTIC ACIDOPHILUS PO) Take  by mouth.     • tizanidine (ZANAFLEX) 2 MG tablet Take 1 Tab by mouth at bedtime as needed (muscle spasm). 30 Tab 0   • levothyroxine (SYNTHROID) 100 MCG Tab Take 1 Tab by mouth every day. 90 Tab 3   • Multiple Vitamin (MULTI VITAMIN DAILY PO) Take  by mouth.     • Omega-3 Fatty Acids (OMEGA 3 PO) Take  by mouth.     • vitamin D (CHOLECALCIFEROL) 1000 UNIT Tab Take 1,000 Units by mouth every day.     • BIOTIN 5000 PO Take  by mouth.     • Magnesium 250 MG Tab Take  by mouth.     • Potassium 99 MG Tab Take  by mouth.     • aspirin 81 MG tablet Take 81 mg by mouth every day.     • Glucosamine HCl (GLUCOSAMINE  "PO) Take  by mouth.       No current facility-administered medications for this visit.     She  has a past medical history of Colon polyps; Postmenopausal hormone replacement therapy; DJD (degenerative joint disease); Hypothyroidism; Migraine; Arthritis (2003); Heart valve disease (2005); Major depression (CMS-[facility]) (5/2/2013); Dental disorder; Anesthesia; and Colon polyp. She also has no past medical history of Backpain, COPD, Fall, or Breast cancer (CMS-HCC).    ROS   No chest pain, no shortness of breath, no abdominal pain.     Objective:     Physical Exam:  Blood pressure 108/62, pulse 64, temperature 37 °C (98.6 °F), resp. rate 12, height 1.753 m (5' 9\"), weight 72 kg (158 lb 11.7 oz), SpO2 95 %. Body mass index is 23.43 kg/(m^2).   Constitutional: Alert, no distress, non-toxic appearance. Pleasant and talking in short sentences.  Skin: Warm, dry, good turgor, no rashes in visible areas.  Eye: Equal, round and reactive, conjunctiva clear, lids normal.  ENMT: Bilateral maxillary sinuses are tender to palpation. TM's clear bilaterally, lips without lesions, good dentition, oropharynx with post-nasal drip.  Neck: Trachea midline, no masses, no thyromegaly. No cervical or supraclavicular lymphadenopathy.  Respiratory: Unlabored respiratory effort, lungs with good airflow bilaterally, no wheezes, no ronchi. Normal egophony.  Cardiovascular: Normal S1, S2, no murmur, no edema.  Psych: Alert and oriented x3, normal affect and mood.    Assessment and Plan:     1. Acute respiratory infection  2. Productive cough  3. Shortness of breath  Persistent productive cough for the last month. Patient is hemodynamically stable today with a overall reassuring pulmonary exam. Given the chronicity, I am concerned for pneumonia, sinus infection or another infectious process. As we have a holiday tomorrow, I have sent in prescriptions for antibiotics and cough medicine. A chest x-ray has been ordered and patient tells me she will have " this done tomorrow. Strict ER return precautions given.  - DX-CHEST-2 VIEWS; Future  - ipratropium-albuterol (DUONEB) nebulizer solution 3 mL; 3 mL by Nebulization route Once.  - levofloxacin (LEVAQUIN) 500 MG tablet; Take 1 Tab by mouth every day.  Dispense: 5 Tab; Refill: 0  - guaifenesin-codeine (TUSSI-ORGANIDIN NR) 100-10 MG/5ML syrup; Take 5 mL by mouth 3 times a day as needed for Cough.  Dispense: 120 mL; Refill: 0    4. Major depressive disorder, recurrent, in remission (CMS-HCC)  Patient is feeling well on current medications. Will continue. Denies any suicidal or homicidal ideation. Emphasized importance of healthy diet and exercise. Discussed that should the patient have any symptoms they should call suicide prevention hotline or report to the emergency room immediately.    Followup: Return if symptoms worsen or fail to improve.         PLEASE NOTE: This dictation was created using voice recognition software. I have made every reasonable attempt to correct obvious errors, but I expect that there are errors of grammar and possibly content that I did not discover before finalizing the note.

## 2017-07-05 ENCOUNTER — TELEPHONE (OUTPATIENT)
Dept: MEDICAL GROUP | Facility: PHYSICIAN GROUP | Age: 77
End: 2017-07-05

## 2017-07-05 ENCOUNTER — HOSPITAL ENCOUNTER (OUTPATIENT)
Dept: RADIOLOGY | Facility: MEDICAL CENTER | Age: 77
End: 2017-07-05
Attending: FAMILY MEDICINE
Payer: MEDICARE

## 2017-07-05 DIAGNOSIS — R05.8 PRODUCTIVE COUGH: ICD-10-CM

## 2017-07-05 DIAGNOSIS — R06.02 SHORTNESS OF BREATH: ICD-10-CM

## 2017-07-05 DIAGNOSIS — J22 ACUTE RESPIRATORY INFECTION: ICD-10-CM

## 2017-07-05 PROCEDURE — 71020 DX-CHEST-2 VIEWS: CPT

## 2017-07-06 NOTE — TELEPHONE ENCOUNTER
----- Message from Citlalli Lindsey M.D. sent at 7/5/2017  4:22 PM PDT -----  Please let patient know that her chest x-ray does not show an infection. There is mild hyperinflation of the lungs that could indicate COPD. I would like her to continue the antibiotics and stay out of the smoke in the meantime.  Citlalli Lindsey M.D.

## 2017-09-11 ENCOUNTER — PATIENT OUTREACH (OUTPATIENT)
Dept: HEALTH INFORMATION MANAGEMENT | Facility: OTHER | Age: 77
End: 2017-09-11

## 2017-09-11 NOTE — PROGRESS NOTES
Attempt #:1    WebIZ Checked & Epic Updated: yes  [facility] Verified: yes  Verify PCP: yes    Communication Preference Obtained: yes     Review Care Team: yes    Annual Wellness Visit Scheduling  1. Scheduling Status:Scheduled        Care Gap Scheduling (Attempt to Schedule EACH Overdue Care Gap!)     Health Maintenance Due   Topic Date Due   • IMM ZOSTER VACCINE  Scheduled   • IMM INFLUENZA (1) Scheduled         [employer] Roundtable[employer] Activation: already active  Connect Controls Carley: no  Virtual Visits: no  Opt In to Text Messages: no

## 2017-09-13 ENCOUNTER — TELEPHONE (OUTPATIENT)
Dept: MEDICAL GROUP | Facility: PHYSICIAN GROUP | Age: 77
End: 2017-09-13

## 2017-09-13 NOTE — TELEPHONE ENCOUNTER
ESTABLISHED PATIENT PRE-VISIT PLANNING     Note: Patient will not be contacted if there is no indication to call.     1.  Reviewed notes from the last few office visits within the medical group: Yes    2.  If any orders were placed at last visit or intended to be done for this visit (i.e. 6 mos follow-up), do we have Results/Consult Notes?        •  Labs - Labs were not ordered at last office visit.       •  Imaging - Imaging was not ordered at last office visit.       •  Referrals - No referrals were ordered at last office visit.    3. Is this appointment scheduled as a Hospital Follow-Up? No    4.  Immunizations were updated in [facility] using WebIZ?: Yes       •  Web Iz Recommendations: FLU, MMR , TD and ZOSTAVAX (Shingles)    5.  Patient is due for the following Health Maintenance Topics:   Health Maintenance Due   Topic Date Due   • IMM ZOSTER VACCINE  09/07/1999   • IMM INFLUENZA (1) 09/01/2017       - Patient has completed FLU, PNEUMOVAX (PPSV23), PREVNAR (PCV13)  and TDAP Immunization(s) per WebIZ. Chart has been updated.      6.  Patient was NOT informed to arrive 15 min prior to their scheduled appointment and bring in their medication bottles.

## 2017-09-14 ENCOUNTER — OFFICE VISIT (OUTPATIENT)
Dept: MEDICAL GROUP | Facility: PHYSICIAN GROUP | Age: 77
End: 2017-09-14
Payer: MEDICARE

## 2017-09-14 VITALS
WEIGHT: 158 LBS | RESPIRATION RATE: 16 BRPM | BODY MASS INDEX: 23.4 KG/M2 | HEIGHT: 69 IN | HEART RATE: 70 BPM | OXYGEN SATURATION: 90 % | TEMPERATURE: 98.6 F | SYSTOLIC BLOOD PRESSURE: 130 MMHG | DIASTOLIC BLOOD PRESSURE: 60 MMHG

## 2017-09-14 DIAGNOSIS — R01.1 HEART MURMUR: ICD-10-CM

## 2017-09-14 DIAGNOSIS — M17.11 OSTEOARTHRITIS OF RIGHT KNEE, UNSPECIFIED OSTEOARTHRITIS TYPE: ICD-10-CM

## 2017-09-14 DIAGNOSIS — M54.32 SCIATICA OF LEFT SIDE: ICD-10-CM

## 2017-09-14 DIAGNOSIS — Z23 NEED FOR VACCINATION: ICD-10-CM

## 2017-09-14 PROBLEM — R05.8 PRODUCTIVE COUGH: Status: RESOLVED | Noted: 2017-07-03 | Resolved: 2017-09-14

## 2017-09-14 PROCEDURE — G0008 ADMIN INFLUENZA VIRUS VAC: HCPCS | Performed by: FAMILY MEDICINE

## 2017-09-14 PROCEDURE — 90662 IIV NO PRSV INCREASED AG IM: CPT | Performed by: FAMILY MEDICINE

## 2017-09-14 PROCEDURE — 20610 DRAIN/INJ JOINT/BURSA W/O US: CPT | Performed by: FAMILY MEDICINE

## 2017-09-14 PROCEDURE — 99214 OFFICE O/P EST MOD 30 MIN: CPT | Mod: 25 | Performed by: FAMILY MEDICINE

## 2017-09-14 RX ORDER — LIDOCAINE HYDROCHLORIDE 10 MG/ML
2 INJECTION, SOLUTION EPIDURAL; INFILTRATION; INTRACAUDAL; PERINEURAL ONCE
OUTPATIENT
Start: 2017-09-14 | End: 2017-09-15

## 2017-09-14 RX ORDER — TRIAMCINOLONE ACETONIDE 40 MG/ML
40 INJECTION, SUSPENSION INTRA-ARTICULAR; INTRAMUSCULAR ONCE
OUTPATIENT
Start: 2017-09-14 | End: 2017-09-15

## 2017-09-14 ASSESSMENT — PAIN SCALES - GENERAL: PAINLEVEL: 3=SLIGHT PAIN

## 2017-09-14 ASSESSMENT — PATIENT HEALTH QUESTIONNAIRE - PHQ9
CLINICAL INTERPRETATION OF PHQ2 SCORE: 1
SUM OF ALL RESPONSES TO PHQ QUESTIONS 1-9: 1
5. POOR APPETITE OR OVEREATING: 0 - NOT AT ALL

## 2017-09-14 NOTE — ASSESSMENT & PLAN NOTE
For the last month, patient has had pain in her right lower back that radiates down the back of her right leg. No injury, trauma or overuse that she remembers. The pain is worse in the evenings. Stretching, heat and NSAIDs will help. It is worse with extended walks.    She denies fever, saddle anesthesia, bowel/bladder incontinence. Hoping to avoid seeing a specialist. She is going to have a massuese come to the house. Also interested in accupuncture.

## 2017-09-14 NOTE — PATIENT INSTRUCTIONS
Knee Injection, Care After  Refer to this sheet in the next few weeks. These instructions provide you with information about caring for yourself after your procedure. Your health care provider may also give you more specific instructions. Your treatment has been planned according to current medical practices, but problems sometimes occur. Call your health care provider if you have any problems or questions after your procedure.  WHAT TO EXPECT AFTER THE PROCEDURE  After your procedure, it is common to have:  · Soreness.  · Warmth.  · Swelling.  You may have more pain, swelling, and warmth than you did before the injection. This reaction may last for about one day.   HOME CARE INSTRUCTIONS  Bathing  · If you were given a bandage (dressing), keep it dry until your health care provider says it can be removed. Ask your health care provider when you can start showering or taking a bath.   Managing Pain, Stiffness, and Swelling  · If directed, apply ice to the injection area:  ¨ Put ice in a plastic bag.  ¨ Place a towel between your skin and the bag.  ¨ Leave the ice on for 20 minutes, 2-3 times per day.  · Do not apply heat to your knee.  · Raise the injection area above the level of your heart while you are sitting or lying down.  Activity  · Avoid strenuous activities for as long as directed by your health care provider. Ask your health care provider when you can return to your normal activities.   General Instructions  · Take medicines only as directed by your health care provider.  · Do not take aspirin or other over-the-counter medicines unless your health care provider says you can.  · Check your injection site every day for signs of infection. Watch for:  ¨ Redness, swelling, or pain.  ¨ Fluid, blood, or pus.  · Follow your health care provider's instructions about dressing changes and removal.  SEEK MEDICAL CARE IF:  · You have symptoms at your injection site that last longer than two days after your  procedure.  · You have redness, swelling, or pain in your injection area.  · You have fluid, blood, or pus coming from your injection site.  · You have warmth in your injection area.  · You have a fever.  · Your pain is not controlled with medicine.  SEEK IMMEDIATE MEDICAL CARE IF:  · Your knee turns very red.  · Your knee becomes very swollen.  · Your knee pain is severe.     This information is not intended to replace advice given to you by your health care provider. Make sure you discuss any questions you have with your health care provider.     Document Released: 01/08/2016 Document Reviewed: 01/08/2016  Patient Education Systems Interactive Patient Education ©2016 [employer]

## 2017-09-15 NOTE — ASSESSMENT & PLAN NOTE
Patient has had a recurrence of pain in her right knee. Denies any new injury or trauma. She has known arthritis and has had recent x-ray imaging. In January, her pain improved with a cortisone injection. It is now keeping her from her daily walks. She is also taking glucosamine and chondroitin. Interested in another cortisone injection today.

## 2017-09-15 NOTE — PROGRESS NOTES
Subjective:   Bernie Barrientos is a 77 y.o. female here today for new sciatica and follow-up right knee pain.    Sciatica of left side  For the last month, patient has had pain in her right lower back that radiates down the back of her right leg. No injury, trauma or overuse that she remembers. The pain is worse in the evenings. Stretching, heat and NSAIDs will help. It is worse with extended walks.    She denies fever, saddle anesthesia, bowel/bladder incontinence. Hoping to avoid seeing a specialist. She is going to have a massuese come to the house. Also interested in accupuncture.     DJD (degenerative joint disease)  Patient has had a recurrence of pain in her right knee. Denies any new injury or trauma. She has known arthritis and has had recent x-ray imaging. In January, her pain improved with a cortisone injection. It is now keeping her from her daily walks. She is also taking glucosamine and chondroitin. Interested in another cortisone injection today.     Current medicines (including changes today)  Current Outpatient Prescriptions   Medication Sig Dispense Refill   • Collagen 500 MG Cap Take 500 mg by mouth every day.     • sertraline (ZOLOFT) 100 MG Tab Take 2 Tabs by mouth every day. 180 Tab 3   • Lactobacillus (PROBIOTIC ACIDOPHILUS PO) Take  by mouth.     • levothyroxine (SYNTHROID) 100 MCG Tab Take 1 Tab by mouth every day. 90 Tab 3   • Multiple Vitamin (MULTI VITAMIN DAILY PO) Take  by mouth.     • Omega-3 Fatty Acids (OMEGA 3 PO) Take  by mouth.     • vitamin D (CHOLECALCIFEROL) 1000 UNIT Tab Take 1,000 Units by mouth every day.     • BIOTIN 5000 PO Take  by mouth.     • Magnesium 250 MG Tab Take  by mouth.     • Potassium 99 MG Tab Take  by mouth.     • aspirin 81 MG tablet Take 81 mg by mouth every day.     • Glucosamine HCl (GLUCOSAMINE PO) Take  by mouth.       Current Facility-Administered Medications   Medication Dose Route Frequency Provider Last Rate Last Dose   • triamcinolone acetonide  "(KENALOG-40) injection 40 mg  40 mg Intramuscular Once Citlalli Lindsey M.D.       • lidocaine PF (XYLOCAINE-MPF) 1 % injection 2 mL  2 mL Injection Once Citlalli Lindsey M.D.         She  has a past medical history of Anesthesia; Arthritis (2003); Colon polyp; Colon polyps; Dental disorder; DJD (degenerative joint disease); Heart valve disease (2005); Hypothyroidism; Major depression (CMS-HCC) (5/2/2013); Migraine; and Postmenopausal hormone replacement therapy. She also has no past medical history of Backpain; Breast cancer (CMS-[facility]); COPD; or Fall.    ROS   No chest pain, no shortness of breath, no abdominal pain.     Objective:     Physical Exam:  Blood pressure 130/60, pulse 70, temperature 37 °C (98.6 °F), resp. rate 16, height 1.753 m (5' 9\"), weight 71.7 kg (158 lb), SpO2 90 %, not currently breastfeeding. Body mass index is 23.33 kg/m².  Constitutional: Alert, no distress.  Skin: Warm, dry, good turgor, no rashes in visible areas.  Eye: Conjunctiva clear, lids normal.  ENMT: TM's clear bilaterally, lips without lesions, good dentition, oropharynx clear.  Neck: Trachea midline, no masses, no thyromegaly.  Respiratory: Unlabored respiratory effort, lungs clear to auscultation, no wheezes, no ronchi.  Cardiovascular: Normal S1, S2, 1-2/6 systolic ejection murmur radiating to carotid, no edema.  Abdomen: Soft, non-tender, no masses, no hepatosplenomegaly.  Spine: No significant spinal curvature on forward bend. Mild tenderness along lumbar spine and left sciatic notch. SLT negative. DTR 2+ patella, 1+ achilles. Strength 5/5 proximal and distal LE. Full hip ROM. Poor hamstring flexibility. No symptoms with axial loading.   Knees: No erythema/edema/ecchymosis/effusion. Tenderness to palpation along lateral joint line of right knee. Patellar grind test positive. No varus/valgus strain. ROM intact. 5/5 strength bilaterally. 2+ deep tendon reflexes bilaterally.  Psych: Alert and oriented x3, normal affect and " mood.    Assessment and Plan:     1. Sciatica of left side  This is a new problem. No injury or trauma mechanism. No red flags and reassuring exam. Discussed treatment options and we agreed to stay conservative. Advised gentle stretching, heat and NSAIDs as needed. Patient declined prednisone burst and imaging. Will follow-up in 6-8 weeks.    2. Osteoarthritis of right knee, unspecified osteoarthritis type  Worsening. No new injury or trauma. Patient has known arthritis and had significant benefit from a cortisone injection ~9 months ago. We discussed performing a repeat injection and she would like to proceed.     PROCEDURE NOTE:  Discussed risks and benefits and patient agrees to cortisone injection of right knee. The joint was prepped and draped in usual sterile fashion. A 25-guage needle was inserted into the anterior lateral aspect of the joint. 2 cc of 2% lidocaine without epi and 1 cc of kenalog 40 was then injected into the joint. The area was cleaned with alcohol swab and band-aid was applied. Patient tolerated procedure well with no complications.    - Consent for Amb Surgery/Procedure  - triamcinolone acetonide (KENALOG-40) injection 40 mg; 1 mL by Intramuscular route Once.  - lidocaine PF (XYLOCAINE-MPF) 1 % injection 2 mL; 2 mL by Injection route Once.    3. Heart murmur  This is a new finding auscultated on exam today. Unclear if related to patient's mitral valve prolapse history or if it is aortic stenosis. Patient declines echocardiogram today. She is asymptomatic. Will continue to monitor at future visits.    4. Need for vaccination  Influenza vaccine discussed and administered in office today.  - INFLUENZA VACCINE, HIGH DOSE (65+ ONLY)    Followup: Return in about 2 months (around 11/14/2017) for f/u sciatica, short.         PLEASE NOTE: This dictation was created using voice recognition software. I have made every reasonable attempt to correct obvious errors, but I expect that there are errors of  grammar and possibly content that I did not discover before finalizing the note.

## 2017-09-21 ENCOUNTER — OFFICE VISIT (OUTPATIENT)
Dept: MEDICAL GROUP | Facility: PHYSICIAN GROUP | Age: 77
End: 2017-09-21
Payer: MEDICARE

## 2017-09-21 ENCOUNTER — HOSPITAL ENCOUNTER (OUTPATIENT)
Dept: RADIOLOGY | Facility: MEDICAL CENTER | Age: 77
End: 2017-09-21
Attending: FAMILY MEDICINE
Payer: MEDICARE

## 2017-09-21 VITALS
HEART RATE: 75 BPM | TEMPERATURE: 98.6 F | HEIGHT: 69 IN | DIASTOLIC BLOOD PRESSURE: 70 MMHG | SYSTOLIC BLOOD PRESSURE: 150 MMHG | OXYGEN SATURATION: 97 % | BODY MASS INDEX: 23.4 KG/M2 | RESPIRATION RATE: 16 BRPM | WEIGHT: 158 LBS

## 2017-09-21 DIAGNOSIS — M79.642 LEFT HAND PAIN: ICD-10-CM
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
## 2017-09-21 DIAGNOSIS — M25.552 LEFT HIP PAIN: ICD-10-CM

## 2017-09-21 PROCEDURE — 99214 OFFICE O/P EST MOD 30 MIN: CPT | Performed by: FAMILY MEDICINE

## 2017-09-21 PROCEDURE — 73130 X-RAY EXAM OF HAND: CPT | Mod: LT

## 2017-09-21 ASSESSMENT — PAIN SCALES - GENERAL: PAINLEVEL: 9=SEVERE PAIN

## 2017-09-21 NOTE — PROGRESS NOTES
"Subjective:   Bernie Barrientos is a 77 y.o. female here today for fall on left side.    Last week, she was gardening and fell backwards over a rock while talking to a neighbor. She denies any preceding symptoms. No dizziness, lightheadedness, shortness of breath or chest pain. She landed on her left side with hand outstretched. She also fell on the outside of her left hip. She was feeling well until yesterday, when she started developing pain at the base of her left thumb and on the outside of her left hip. She describes the pain as \"dull\" and \"achy.\" The severity is 7-8 out of 10. She has been taking 200-400 mg of ibuprofen a day which does help relieve her pain.    She has been able to stay active. No issues walking. She denies fevers, chills, nausea, vomiting or abdominal pain.    Has history of left hand and hip surgery.    Current medicines (including changes today)  Current Outpatient Prescriptions   Medication Sig Dispense Refill   • Collagen 500 MG Cap Take 500 mg by mouth every day.     • sertraline (ZOLOFT) 100 MG Tab Take 2 Tabs by mouth every day. 180 Tab 3   • Lactobacillus (PROBIOTIC ACIDOPHILUS PO) Take  by mouth.     • levothyroxine (SYNTHROID) 100 MCG Tab Take 1 Tab by mouth every day. 90 Tab 3   • Multiple Vitamin (MULTI VITAMIN DAILY PO) Take  by mouth.     • Omega-3 Fatty Acids (OMEGA 3 PO) Take  by mouth.     • vitamin D (CHOLECALCIFEROL) 1000 UNIT Tab Take 1,000 Units by mouth every day.     • BIOTIN 5000 PO Take  by mouth.     • Magnesium 250 MG Tab Take  by mouth.     • Potassium 99 MG Tab Take  by mouth.     • aspirin 81 MG tablet Take 81 mg by mouth every day.     • Glucosamine HCl (GLUCOSAMINE PO) Take  by mouth.       No current facility-administered medications for this visit.      She  has a past medical history of Anesthesia; Arthritis (2003); Colon polyp; Colon polyps; Dental disorder; DJD (degenerative joint disease); Heart valve disease (2005); Hypothyroidism; Major depression " "(CMS-HCC) (5/2/2013); Migraine; and Postmenopausal hormone replacement therapy. She also has no past medical history of Backpain; Breast cancer (CMS-HCC); COPD; or Fall.    ROS   See HPI. No chest pain, no shortness of breath, no abdominal pain.     Objective:     Physical Exam:  Blood pressure 150/70, pulse 75, temperature 37 °C (98.6 °F), resp. rate 16, height 1.753 m (5' 9\"), weight 71.7 kg (158 lb), SpO2 97 %, not currently breastfeeding. Body mass index is 23.33 kg/m².   Constitutional: Alert, mild distress due to left hand pain. Non-toxic appearance.  Skin: Large ecchymosis on left lateral hip extending down to left knee.  Eye: Conjunctiva clear, lids normal.  ENMT: Lips without lesions, good dentition, oropharynx clear.  Neck: Trachea midline, no masses, no thyromegaly.  Respiratory: Unlabored respiratory effort, no cough.  MSK: Normal gait. SORIANO with full ROM.  Wrist/Hand: No deformity or swelling. Mild bruising on left thenar eminence. Tenderness to palpation along base of left thumb. No tenderness at snuffbox. Range of motion intact. Strength and sensation intact. Good  strength. 2+ radial pulse. Finkelstein's test negative.  Hip: Tenderness to palpation along lateral left hip. Normal flexion, extension, abduction and adduction ROM. No pain with axial load or internal rotation. Negative FADDIR. Negative SLR.  Psych: Alert and oriented x3, normal affect and mood.    Assessment and Plan:     1. Fall from ground level  2. Left hand pain  3. Left hip pain  This is a new problem. Patient does have a history of ground-level fall without preceding symptoms. Musculoskeletal examination today are reassuring. Given her history of surgery on her left hand in the severity of pain, I will order plain films. Conservative management discussed. Advised rest, heat and anti-inflammatories. Should patient's symptoms not improve, may consider referral to orthopedics.    Followup: Return if symptoms worsen or fail to " improve.         PLEASE NOTE: This dictation was created using voice recognition software. I have made every reasonable attempt to correct obvious errors, but I expect that there are errors of grammar and possibly content that I did not discover before finalizing the note.

## 2017-10-10 ENCOUNTER — TELEPHONE (OUTPATIENT)
Dept: MEDICAL GROUP | Facility: PHYSICIAN GROUP | Age: 77
End: 2017-10-10

## 2017-10-10 NOTE — TELEPHONE ENCOUNTER
ANNUAL WELLNESS VISIT PRE-VISIT PLANNING     1.  Reviewed note from last office visit with PCP: YES    2.  If any orders were placed at last visit, do we have Results/Consult Notes?        •  Labs - Labs were not ordered at last office visit.   Note: If patient appointment is for lab review and patient did not complete labs,                check with provider if OK to reschedule patient until labs completed.       •  Imaging - Imaging ordered, completed and results are in chart.       •  Referrals - No referrals were ordered at last office visit.    3.  Immunizations were updated in [facility] using WebIZ?: Yes       •  WebIZ Recommendations: ZOSTAVAX (Shingles) PT Declined       •  Is patient due for Tdap? NO       •  Is patient due for Shingles? YES. Patient was not notified of copay/out of pocket cost.     4.  Patient is due for the following Health Maintenance Topics:   Health Maintenance Due   Topic Date Due   • Annual Wellness Visit  09/16/2017       - Patient has completed FLU, PNEUMOVAX (PPSV23), PREVNAR (PCV13)  and TDAP Immunization(s) per WebIZ. Chart has been updated.      5.  Reviewed/Updated the following with patient:       •   Preferred Pharmacy? YES       •   Preferred Lab? YES       •   Preferred Communication? YES       •   Allergies? YES       •   Medications? YES. Was Abstract Encounter opened and chart updated? YES       •   Social History? YES. Was Abstract Encounter opened and chart updated? YES       •   Family History (document living status of immediate family members and if + hx of cancer, diabetes, hypertension, hyperlipidemia, heart attack, stroke) YES. Was Abstract Encounter opened and chart updated? YES    6.  Care Team Updated:       •   DME Company (gait device, O2, CPAP, etc.): NO       •   Other Specialists (eye doctor, derm, GYN, cardiology, endo, etc): YES    7.  Patient has the following Care Path diagnoses on Problem List:  NONE    8.  Specialty Comments was updated with diagnosis  information provided by [facility]: NO    9.  Patient was advised: “This is a free wellness visit. The provider will screen for medical conditions to help you stay healthy. If you have other concerns to address you may be asked to discuss these at a separate visit or there may be an additional fee.”     6.  Patient was informed to arrive 15 min prior to their scheduled appointment and bring in their medication bottles.

## 2017-10-17 ENCOUNTER — OFFICE VISIT (OUTPATIENT)
Dept: MEDICAL GROUP | Facility: PHYSICIAN GROUP | Age: 77
End: 2017-10-17
Payer: MEDICARE

## 2017-10-17 VITALS
DIASTOLIC BLOOD PRESSURE: 82 MMHG | OXYGEN SATURATION: 96 % | SYSTOLIC BLOOD PRESSURE: 162 MMHG | RESPIRATION RATE: 16 BRPM | HEART RATE: 56 BPM | HEIGHT: 70 IN | BODY MASS INDEX: 22.69 KG/M2 | TEMPERATURE: 97.5 F | WEIGHT: 158.51 LBS

## 2017-10-17 DIAGNOSIS — Z00.00 MEDICARE ANNUAL WELLNESS VISIT, SUBSEQUENT: ICD-10-CM

## 2017-10-17 DIAGNOSIS — R03.0 ELEVATED BLOOD PRESSURE READING: ICD-10-CM

## 2017-10-17 DIAGNOSIS — R01.1 HEART MURMUR: ICD-10-CM

## 2017-10-17 DIAGNOSIS — Z91.81 RISK FOR FALLS: ICD-10-CM

## 2017-10-17 DIAGNOSIS — E03.9 ACQUIRED HYPOTHYROIDISM: ICD-10-CM

## 2017-10-17 DIAGNOSIS — Z63.4 DEATH OF PARTNER: ICD-10-CM

## 2017-10-17 DIAGNOSIS — E78.00 PURE HYPERCHOLESTEROLEMIA: ICD-10-CM

## 2017-10-17 DIAGNOSIS — J30.1 ACUTE SEASONAL ALLERGIC RHINITIS DUE TO POLLEN: ICD-10-CM

## 2017-10-17 DIAGNOSIS — M17.11 OSTEOARTHRITIS OF RIGHT KNEE, UNSPECIFIED OSTEOARTHRITIS TYPE: ICD-10-CM

## 2017-10-17 DIAGNOSIS — R07.9 CHEST PAIN IN ADULT: ICD-10-CM

## 2017-10-17 DIAGNOSIS — N18.30 STAGE 3 CHRONIC KIDNEY DISEASE (HCC): ICD-10-CM

## 2017-10-17 DIAGNOSIS — M54.32 SCIATICA OF LEFT SIDE: ICD-10-CM

## 2017-10-17 DIAGNOSIS — R73.01 ELEVATED FASTING GLUCOSE: ICD-10-CM

## 2017-10-17 DIAGNOSIS — F33.41 RECURRENT MAJOR DEPRESSIVE DISORDER, IN PARTIAL REMISSION (HCC): ICD-10-CM

## 2017-10-17 PROCEDURE — G0439 PPPS, SUBSEQ VISIT: HCPCS | Mod: 25 | Performed by: FAMILY MEDICINE

## 2017-10-17 PROCEDURE — 93000 ELECTROCARDIOGRAM COMPLETE: CPT | Performed by: FAMILY MEDICINE

## 2017-10-17 PROCEDURE — 99213 OFFICE O/P EST LOW 20 MIN: CPT | Mod: 25 | Performed by: FAMILY MEDICINE

## 2017-10-17 SDOH — SOCIAL STABILITY - SOCIAL INSECURITY: DISSAPEARANCE AND DEATH OF FAMILY MEMBER: Z63.4

## 2017-10-17 ASSESSMENT — PATIENT HEALTH QUESTIONNAIRE - PHQ9
5. POOR APPETITE OR OVEREATING: 1 - SEVERAL DAYS
SUM OF ALL RESPONSES TO PHQ QUESTIONS 1-9: 13
CLINICAL INTERPRETATION OF PHQ2 SCORE: 6

## 2017-10-17 NOTE — PROGRESS NOTES
"Chief Complaint   Patient presents with   • Annual Wellness Visit         HPI:  Bernie is a 77 y.o. here for Medicare Annual Wellness Visit.     Unfortunately, her neighbor and friend unexpectedly passed away last night. She is feeling sad and also mentions that she has had a \"pressure\" sensation in her chest. It is uncomfortable. Occurring off and on throughout today. No radiation. No associated lightheadedness, dizziness, shortness of breath or nausea. She has a history of high cholesterol and elevated blood sugar. Her blood pressure is elevated which is unusual for her.      Patient Active Problem List    Diagnosis Date Noted   • DJD (degenerative joint disease)      Priority: Low   • Colon polyps      Priority: Low   • Sciatica of left side 09/14/2017   • Heart murmur 09/14/2017   • Seasonal allergic rhinitis due to pollen 06/02/2017   • Elevated fasting glucose 06/02/2017   • Pure hypercholesterolemia 06/02/2017   • Atypical mole 12/29/2016   • Major depressive disorder, recurrent, in remission (CMS-HCC) 09/15/2016   • Acquired hypothyroidism 11/30/2015   • CKD (chronic kidney disease) 11/30/2015   • S/P bilateral hip replacements 11/30/2015       Current Outpatient Prescriptions   Medication Sig Dispense Refill   • Collagen 500 MG Cap Take 500 mg by mouth every day.     • sertraline (ZOLOFT) 100 MG Tab Take 2 Tabs by mouth every day. 180 Tab 3   • Lactobacillus (PROBIOTIC ACIDOPHILUS PO) Take  by mouth.     • levothyroxine (SYNTHROID) 100 MCG Tab Take 1 Tab by mouth every day. 90 Tab 3   • Multiple Vitamin (MULTI VITAMIN DAILY PO) Take  by mouth.     • Omega-3 Fatty Acids (OMEGA 3 PO) Take  by mouth.     • vitamin D (CHOLECALCIFEROL) 1000 UNIT Tab Take 1,000 Units by mouth every day.     • BIOTIN 5000 PO Take  by mouth.     • Magnesium 250 MG Tab Take  by mouth.     • Potassium 99 MG Tab Take  by mouth.     • aspirin 81 MG tablet Take 81 mg by mouth every day.     • Glucosamine HCl (GLUCOSAMINE PO) Take  by " mouth.       No current facility-administered medications for this visit.         Patient is taking medications as noted in medication list.  Current supplements as per medication list.     Allergies: Naprosyn [naproxen] and Pcn [penicillins]    Current social contact/activities: Spending time with friends, going out to lunch, traveling    Is patient current with immunizations? No, due for ZOSTAVAX (Shingles). Patient is interested in receiving NONE today.    She  reports that she quit smoking about 37 years ago. Her smoking use included Cigarettes. She has a 8.00 pack-year smoking history. She has never used smokeless tobacco. She reports that she drinks about 0.5 oz of alcohol per week . She reports that she does not use drugs.  Counseling given: Not Answered      DPA/Advanced directive: Patient has Advanced Directive on file.     ROS:    Gait: Uses no assistive device   Ostomy: no   Other tubes: no   Amputations: no   Chronic oxygen use no   Last eye exam 01/17   Wears hearing aids: no   : Reports incontinence.     Screening:    Depression Screening  Little interest or pleasure in doing things?  3 - nearly every day  Feeling down, depressed, or hopeless? 3 - nearly every day  Trouble falling or staying asleep, or sleeping too much?  3 - nearly every day  Feeling tired or having little energy?  3 - nearly every day  Poor appetite or overeating?  1 - several days  Feeling bad about yourself - or that you are a failure or have let yourself or your family down? 0 - not at all  Trouble concentrating on things, such as reading the newspaper or watching television? 0 - not at all  Moving or speaking so slowly that other people could have noticed.  Or the opposite - being so fidgety or restless that you have been moving around a lot more than usual?  0 - not at all  Thoughts that you would be better off dead, or of hurting yourself?  0 - not at all  Patient Health Questionnaire Score: 13    If depressive symptoms  identified deferred to follow up visit unless specifically addressed in assessment and plan.    Interpretation of PHQ-9 Total Score   Score Severity   1-4 No Depression   5-9 Mild Depression   10-14 Moderate Depression   15-19 Moderately Severe Depression   20-27 Severe Depression    Screening for Cognitive Impairment  Three Minute Recall (apple, watch, bryan)  3/3    Draw clock face with all 12 numbers set to the hand to show 10 minutes past 11 o'clock  1 5/5  If cognitive concerns identified, deferred for follow up unless specifically addressed in assessment and plan.    Fall Risk Assessment  Has the patient had two or more falls in the last year or any fall with injury in the last year?  Yes  If fall risk identified, deferred for follow up unless specifically addressed in assessment and plan.    Safety Assessment  Throw rugs on floor.  No  Handrails on all stairs.  Yes  Good lighting in all hallways.  Yes  Difficulty hearing.  No  Patient counseled about all safety risks that were identified.    Functional Assessment ADLs  Are there any barriers preventing you from cooking for yourself or meeting nutritional needs?  No.    Are there any barriers preventing you from driving safely or obtaining transportation?  No.    Are there any barriers preventing you from using a telephone or calling for help?  No.    Are there any barriers preventing you from shopping?  No.    Are there any barriers preventing you from taking care of your own finances?  No.    Are there any barriers preventing you from managing your medications?  No.    Are you currently engaging any exercise or physical activity?  Yes.  Walking some    Health Maintenance Summary                Annual Wellness Visit Overdue 9/16/2017      Done 9/15/2016 Visit Dx: Medicare annual wellness visit, subsequent     Patient has more history with this topic...    IMM ZOSTER VACCINE Postponed 9/13/2018 Originally 9/7/1999. Patient Refused    BONE DENSITY Next Due  2/21/2022      Done 2/21/2017 DS-BONE DENSITY STUDY (DEXA)     Patient has more history with this topic...    COLONOSCOPY Next Due 9/11/2024      Not specified 9/11/2014      Patient has more history with this topic...    IMM DTaP/Tdap/Td Vaccine Next Due 12/4/2024      Done 12/4/2014 Imm Admin: Tdap Vaccine          Patient Care Team:  Citlalli Lindsey M.D. as PCP - General (Family Medicine)  Clem Harrison M.D. as Consulting Physician (Ophthalmology)  Javier Gould M.D. (Gastroenterology)      Social History   Substance Use Topics   • Smoking status: Former Smoker     Packs/day: 1.00     Years: 8.00     Types: Cigarettes     Quit date: 1/1/1980   • Smokeless tobacco: Never Used      Comment: quit 1975   • Alcohol use 0.5 oz/week     1 Glasses of wine per week      Comment: two per week     Family History   Problem Relation Age of Onset   • Cancer Mother      colon   • Thyroid Sister    • Diabetes     • Heart Disease Neg Hx    • Hypertension Neg Hx    • Hyperlipidemia Neg Hx      She  has a past medical history of Anesthesia; Arthritis (2003); Colon polyp; Colon polyps; Dental disorder; DJD (degenerative joint disease); Heart valve disease (2005); Hypothyroidism; Major depression (5/2/2013); Migraine; and Postmenopausal hormone replacement therapy. She also has no past medical history of Backpain; Breast cancer (CMS-HCC); COPD; or Fall.   Past Surgical History:   Procedure Laterality Date   • COLONOSCOPY WITH POLYP  8/15/2013    Performed by Javier Gould M.D. at [facility] ORS   • HAND SURGERY  2009    right index finger joint reconstruction   • HAND SURGERY  2004    bilateral thumb reconstruction   • HIP REPLACEMENT, TOTAL  2001    bilateral   • ABDOMINAL HYSTERECTOMY TOTAL  1984   • APPENDECTOMY  1957   • ATHROPLASTY Bilateral     hips   • LAMINOTOMY      4,5,6,7 neck fusion   • OTHER NEUROLOGICAL SURG  2004/2008    spinal fusion C4-7   • TONSILLECTOMY  as child         Exam:     Blood pressure (!)  "176/90, pulse (!) 56, temperature 36.4 °C (97.5 °F), resp. rate 16, height 1.765 m (5' 9.5\"), weight 71.9 kg (158 lb 8.2 oz), SpO2 96 %. Body mass index is 23.07 kg/m².    Constitutional: Alert, no distress, non-toxic appearance.  Skin: Warm, dry, good turgor, no rashes in visible areas.  Eye: Equal, round and reactive, conjunctiva clear, lids normal.  ENMT: Lips without lesions, good dentition, oropharynx clear. Hearing is good.  Neck: Trachea midline, no masses, no thyromegaly. No cervical or supraclavicular lymphadenopathy  Respiratory: Unlabored respiratory effort, lungs clear to auscultation, no wheezes, no ronchi.  Cardiovascular: Normal S1, S2, soft 1/6 systolic murmur, no edema. Chest wall non-tender to palpation.  Abdomen: Soft, non-tender, no masses, no hepatosplenomegaly.  Psych: Alert and oriented x3, normal affect and mood. Eye contact is good, speech goal directed, affect calm.    EKG interpretation by me: Sinus bradycardia. Heart rate of 50. Leftward axis. Deep S waves in leads II, III, aVF, V1-V4. Similar findings to EKG from 2013. No other ST or QRS morphologic changes. No T-wave inversions. [place] and QT intervals are normal. Quality of EKG is good.     Assessment and Plan. The following treatment and monitoring plan is recommended:      1. Chest pain in adult  2. Elevated blood pressure reading  3. Death of partner  This is a new problem. Timing corresponds to recent bad news regarding the death of her friend. Exam and EKG are unchanged. Patient reported resolution of chest pain after discussing her EKG. Strongly urged her to seek immediate medical attention if her symptoms return. She will have close follow-up to recheck her blood pressure.    4. Heart murmur  Chronic and stable. Patient has had a previous echocardiogram that showed mild mitral regurgitation. Continue to monitor.    5. Recurrent major depressive disorder, in partial remission (CMS-HCC)  Patient is feeling well on current medications. " Will continue. Denies any suicidal or homicidal ideation. Emphasized importance of healthy diet and exercise. Discussed that should the patient have any symptoms they should call suicide prevention hotline or report to the emergency room immediately.  - Patient has been identified as being depressed and appropriate orders and counseling have been given    6. Medicare annual wellness visit, subsequent  HRA reviewed and appropriate. See discussion of anticipatory guidance below. Patient will return annually for AWVs.    7. Pure hypercholesterolemia  Well controlled. Labs as indicated. Continue statin medication. Continue to monitor.    8. Elevated fasting glucose  Chronic and stable. Check fasting glucose annually.    9. Acquired hypothyroidism  Continue thyroid medication. Instructed patient to take on empty stomach with glass of water, 30 minutes prior to food or other medications. Labs as indicated.    10. Stage 3 chronic kidney disease  Chronic and stable. Serial labs. Avoid nephrotoxic medications.    11. Acute seasonal allergic rhinitis due to pollen  Stable. Advised nasal saline and steroid sprays daily. OTC anti-histamines (Zyrtec) as needed. Encouraged allergen avoidence and environment modification when possible. If regular use not effective, may consider referral to allergist for immunotherapy.    12. Sciatica of left side  Chronic and improving with home exercises. Monitor.    13. Osteoarthritis of right knee, unspecified osteoarthritis type  Chronic and improving with home exercises. Monitor.    14. Risk for falls  Discussed previous fall which was mechanical. Patient declines assistive device.  - Patient identified as fall risk.  Appropriate orders and counseling given.    Services suggested: No services needed at this time  Health Care Screening recommendations as per orders if indicated.  Referrals offered: PT/OT/Nutrition counseling/Behavioral Health/Smoking cessation as per orders if  indicated.    Discussion today about general wellness and lifestyle habits:    · Prevent falls and reduce trip hazards; Cautioned about securing or removing rugs.  · Have a working fire alarm and carbon monoxide detector;   · Engage in regular physical activity and social activities     Follow-up: 1 week

## 2017-10-24 ENCOUNTER — APPOINTMENT (OUTPATIENT)
Dept: MEDICAL GROUP | Facility: PHYSICIAN GROUP | Age: 77
End: 2017-10-24
Payer: MEDICARE

## 2017-11-13 ENCOUNTER — APPOINTMENT (OUTPATIENT)
Dept: RADIOLOGY | Facility: MEDICAL CENTER | Age: 77
End: 2017-11-13
Attending: EMERGENCY MEDICINE
Payer: MEDICARE

## 2017-11-13 ENCOUNTER — RESOLUTE PROFESSIONAL BILLING HOSPITAL PROF FEE (OUTPATIENT)
Dept: HOSPITALIST | Facility: MEDICAL CENTER | Age: 77
End: 2017-11-13
Payer: MEDICARE

## 2017-11-13 ENCOUNTER — OFFICE VISIT (OUTPATIENT)
Dept: MEDICAL GROUP | Facility: PHYSICIAN GROUP | Age: 77
End: 2017-11-13
Payer: MEDICARE

## 2017-11-13 ENCOUNTER — HOSPITAL ENCOUNTER (OUTPATIENT)
Facility: MEDICAL CENTER | Age: 77
End: 2017-11-14
Attending: EMERGENCY MEDICINE | Admitting: HOSPITALIST
Payer: MEDICARE

## 2017-11-13 VITALS
DIASTOLIC BLOOD PRESSURE: 60 MMHG | OXYGEN SATURATION: 97 % | SYSTOLIC BLOOD PRESSURE: 150 MMHG | HEART RATE: 62 BPM | RESPIRATION RATE: 24 BRPM

## 2017-11-13 DIAGNOSIS — R06.02 SHORTNESS OF BREATH: ICD-10-CM

## 2017-11-13 DIAGNOSIS — R07.9 CHEST PAIN, UNSPECIFIED TYPE: ICD-10-CM

## 2017-11-13 DIAGNOSIS — R25.1 SHAKING: ICD-10-CM

## 2017-11-13 LAB
ALBUMIN SERPL BCP-MCNC: 4.2 G/DL (ref 3.2–4.9)
ALBUMIN/GLOB SERPL: 1.6 G/DL
ALP SERPL-CCNC: 61 U/L (ref 30–99)
ALT SERPL-CCNC: 9 U/L (ref 2–50)
ANION GAP SERPL CALC-SCNC: 9 MMOL/L (ref 0–11.9)
APTT PPP: 27.1 SEC (ref 24.7–36)
AST SERPL-CCNC: 17 U/L (ref 12–45)
BASOPHILS # BLD AUTO: 0.9 % (ref 0–1.8)
BASOPHILS # BLD: 0.06 K/UL (ref 0–0.12)
BILIRUB SERPL-MCNC: 0.4 MG/DL (ref 0.1–1.5)
BNP SERPL-MCNC: 83 PG/ML (ref 0–100)
BUN SERPL-MCNC: 17 MG/DL (ref 8–22)
CALCIUM SERPL-MCNC: 9.2 MG/DL (ref 8.5–10.5)
CHLORIDE SERPL-SCNC: 100 MMOL/L (ref 96–112)
CO2 SERPL-SCNC: 19 MMOL/L (ref 20–33)
CREAT SERPL-MCNC: 0.82 MG/DL (ref 0.5–1.4)
DEPRECATED D DIMER PPP IA-ACNC: 361 NG/ML(D-DU)
EKG IMPRESSION: NORMAL
EOSINOPHIL # BLD AUTO: 0.22 K/UL (ref 0–0.51)
EOSINOPHIL NFR BLD: 3.3 % (ref 0–6.9)
ERYTHROCYTE [DISTWIDTH] IN BLOOD BY AUTOMATED COUNT: 45.6 FL (ref 35.9–50)
GFR SERPL CREATININE-BSD FRML MDRD: >60 ML/MIN/1.73 M 2
GLOBULIN SER CALC-MCNC: 2.6 G/DL (ref 1.9–3.5)
GLUCOSE SERPL-MCNC: 100 MG/DL (ref 65–99)
HCT VFR BLD AUTO: 40.2 % (ref 37–47)
HGB BLD-MCNC: 13.6 G/DL (ref 12–16)
IMM GRANULOCYTES # BLD AUTO: 0.02 K/UL (ref 0–0.11)
IMM GRANULOCYTES NFR BLD AUTO: 0.3 % (ref 0–0.9)
INR PPP: 1.09 (ref 0.87–1.13)
LIPASE SERPL-CCNC: 24 U/L (ref 11–82)
LYMPHOCYTES # BLD AUTO: 1.08 K/UL (ref 1–4.8)
LYMPHOCYTES NFR BLD: 16 % (ref 22–41)
MCH RBC QN AUTO: 28.9 PG (ref 27–33)
MCHC RBC AUTO-ENTMCNC: 33.8 G/DL (ref 33.6–35)
MCV RBC AUTO: 85.5 FL (ref 81.4–97.8)
MONOCYTES # BLD AUTO: 0.45 K/UL (ref 0–0.85)
MONOCYTES NFR BLD AUTO: 6.7 % (ref 0–13.4)
NEUTROPHILS # BLD AUTO: 4.91 K/UL (ref 2–7.15)
NEUTROPHILS NFR BLD: 72.8 % (ref 44–72)
NRBC # BLD AUTO: 0 K/UL
NRBC BLD AUTO-RTO: 0 /100 WBC
PLATELET # BLD AUTO: 286 K/UL (ref 164–446)
PMV BLD AUTO: 10 FL (ref 9–12.9)
POTASSIUM SERPL-SCNC: 3.9 MMOL/L (ref 3.6–5.5)
PROT SERPL-MCNC: 6.8 G/DL (ref 6–8.2)
PROTHROMBIN TIME: 13.8 SEC (ref 12–14.6)
RBC # BLD AUTO: 4.7 M/UL (ref 4.2–5.4)
SODIUM SERPL-SCNC: 128 MMOL/L (ref 135–145)
TROPONIN I SERPL-MCNC: <0.01 NG/ML (ref 0–0.04)
TROPONIN I SERPL-MCNC: <0.01 NG/ML (ref 0–0.04)
WBC # BLD AUTO: 6.7 K/UL (ref 4.8–10.8)

## 2017-11-13 PROCEDURE — 700111 HCHG RX REV CODE 636 W/ 250 OVERRIDE (IP): Performed by: EMERGENCY MEDICINE

## 2017-11-13 PROCEDURE — 700117 HCHG RX CONTRAST REV CODE 255: Performed by: EMERGENCY MEDICINE

## 2017-11-13 PROCEDURE — 83690 ASSAY OF LIPASE: CPT

## 2017-11-13 PROCEDURE — A9270 NON-COVERED ITEM OR SERVICE: HCPCS | Performed by: FAMILY MEDICINE

## 2017-11-13 PROCEDURE — G0378 HOSPITAL OBSERVATION PER HR: HCPCS

## 2017-11-13 PROCEDURE — 700102 HCHG RX REV CODE 250 W/ 637 OVERRIDE(OP): Performed by: FAMILY MEDICINE

## 2017-11-13 PROCEDURE — 85610 PROTHROMBIN TIME: CPT

## 2017-11-13 PROCEDURE — 99285 EMERGENCY DEPT VISIT HI MDM: CPT

## 2017-11-13 PROCEDURE — 93005 ELECTROCARDIOGRAM TRACING: CPT | Performed by: EMERGENCY MEDICINE

## 2017-11-13 PROCEDURE — 71010 DX-CHEST-LIMITED (1 VIEW): CPT

## 2017-11-13 PROCEDURE — 36415 COLL VENOUS BLD VENIPUNCTURE: CPT

## 2017-11-13 PROCEDURE — 96365 THER/PROPH/DIAG IV INF INIT: CPT | Mod: XU

## 2017-11-13 PROCEDURE — 700102 HCHG RX REV CODE 250 W/ 637 OVERRIDE(OP): Performed by: HOSPITALIST

## 2017-11-13 PROCEDURE — 99220 PR INITIAL OBSERVATION CARE,LEVL III: CPT | Performed by: HOSPITALIST

## 2017-11-13 PROCEDURE — 85379 FIBRIN DEGRADATION QUANT: CPT

## 2017-11-13 PROCEDURE — 80053 COMPREHEN METABOLIC PANEL: CPT

## 2017-11-13 PROCEDURE — 84484 ASSAY OF TROPONIN QUANT: CPT

## 2017-11-13 PROCEDURE — 93005 ELECTROCARDIOGRAM TRACING: CPT

## 2017-11-13 PROCEDURE — 99215 OFFICE O/P EST HI 40 MIN: CPT | Performed by: FAMILY MEDICINE

## 2017-11-13 PROCEDURE — 71275 CT ANGIOGRAPHY CHEST: CPT

## 2017-11-13 PROCEDURE — 85025 COMPLETE CBC W/AUTO DIFF WBC: CPT

## 2017-11-13 PROCEDURE — A9270 NON-COVERED ITEM OR SERVICE: HCPCS | Performed by: HOSPITALIST

## 2017-11-13 PROCEDURE — 85730 THROMBOPLASTIN TIME PARTIAL: CPT

## 2017-11-13 PROCEDURE — 96366 THER/PROPH/DIAG IV INF ADDON: CPT

## 2017-11-13 PROCEDURE — 83880 ASSAY OF NATRIURETIC PEPTIDE: CPT

## 2017-11-13 RX ORDER — ACETAMINOPHEN 325 MG/1
650 TABLET ORAL EVERY 4 HOURS PRN
Status: DISCONTINUED | OUTPATIENT
Start: 2017-11-13 | End: 2017-11-14 | Stop reason: HOSPADM

## 2017-11-13 RX ORDER — OXYCODONE HYDROCHLORIDE 5 MG/1
5 TABLET ORAL EVERY 4 HOURS PRN
Status: DISCONTINUED | OUTPATIENT
Start: 2017-11-13 | End: 2017-11-14 | Stop reason: HOSPADM

## 2017-11-13 RX ORDER — OXYCODONE HYDROCHLORIDE 10 MG/1
10 TABLET ORAL EVERY 4 HOURS PRN
Status: DISCONTINUED | OUTPATIENT
Start: 2017-11-13 | End: 2017-11-14 | Stop reason: HOSPADM

## 2017-11-13 RX ORDER — ENALAPRILAT 1.25 MG/ML
1.25 INJECTION INTRAVENOUS EVERY 6 HOURS PRN
Status: DISCONTINUED | OUTPATIENT
Start: 2017-11-13 | End: 2017-11-14 | Stop reason: HOSPADM

## 2017-11-13 RX ORDER — SERTRALINE HYDROCHLORIDE 100 MG/1
200 TABLET, FILM COATED ORAL DAILY
Status: DISCONTINUED | OUTPATIENT
Start: 2017-11-13 | End: 2017-11-14 | Stop reason: HOSPADM

## 2017-11-13 RX ORDER — ASPIRIN 81 MG/1
81 TABLET, CHEWABLE ORAL DAILY
Status: DISCONTINUED | OUTPATIENT
Start: 2017-11-13 | End: 2017-11-14 | Stop reason: HOSPADM

## 2017-11-13 RX ORDER — NITROGLYCERIN 20 MG/100ML
0-200 INJECTION INTRAVENOUS ONCE
Status: COMPLETED | OUTPATIENT
Start: 2017-11-13 | End: 2017-11-13

## 2017-11-13 RX ORDER — BISACODYL 10 MG
10 SUPPOSITORY, RECTAL RECTAL
Status: DISCONTINUED | OUTPATIENT
Start: 2017-11-13 | End: 2017-11-14 | Stop reason: HOSPADM

## 2017-11-13 RX ORDER — FAMOTIDINE 20 MG/1
20 TABLET, FILM COATED ORAL 2 TIMES DAILY
Status: DISCONTINUED | OUTPATIENT
Start: 2017-11-13 | End: 2017-11-14 | Stop reason: HOSPADM

## 2017-11-13 RX ORDER — ONDANSETRON 2 MG/ML
4 INJECTION INTRAMUSCULAR; INTRAVENOUS EVERY 4 HOURS PRN
Status: DISCONTINUED | OUTPATIENT
Start: 2017-11-13 | End: 2017-11-14 | Stop reason: HOSPADM

## 2017-11-13 RX ORDER — POLYETHYLENE GLYCOL 3350 17 G/17G
1 POWDER, FOR SOLUTION ORAL
Status: DISCONTINUED | OUTPATIENT
Start: 2017-11-13 | End: 2017-11-14 | Stop reason: HOSPADM

## 2017-11-13 RX ORDER — LEVOTHYROXINE SODIUM 0.1 MG/1
100 TABLET ORAL DAILY
Status: DISCONTINUED | OUTPATIENT
Start: 2017-11-14 | End: 2017-11-14 | Stop reason: HOSPADM

## 2017-11-13 RX ORDER — AMOXICILLIN 250 MG
2 CAPSULE ORAL 2 TIMES DAILY
Status: DISCONTINUED | OUTPATIENT
Start: 2017-11-13 | End: 2017-11-14 | Stop reason: HOSPADM

## 2017-11-13 RX ORDER — ONDANSETRON 4 MG/1
4 TABLET, ORALLY DISINTEGRATING ORAL EVERY 4 HOURS PRN
Status: DISCONTINUED | OUTPATIENT
Start: 2017-11-13 | End: 2017-11-14 | Stop reason: HOSPADM

## 2017-11-13 RX ORDER — CHLORAL HYDRATE 500 MG
1000 CAPSULE ORAL DAILY
Status: DISCONTINUED | OUTPATIENT
Start: 2017-11-13 | End: 2017-11-14 | Stop reason: HOSPADM

## 2017-11-13 RX ADMIN — OMEGA-3 FATTY ACIDS CAP 1000 MG 1000 MG: 1000 CAP at 16:33

## 2017-11-13 RX ADMIN — IOHEXOL 50 ML: 350 INJECTION, SOLUTION INTRAVENOUS at 14:45

## 2017-11-13 RX ADMIN — NITROGLYCERIN 10 MCG/MIN: 20 INJECTION INTRAVENOUS at 12:43

## 2017-11-13 RX ADMIN — FAMOTIDINE 20 MG: 20 TABLET, FILM COATED ORAL at 20:09

## 2017-11-13 RX ADMIN — VITAMIN D, TAB 1000IU (100/BT) 1000 UNITS: 25 TAB at 16:33

## 2017-11-13 RX ADMIN — ACETAMINOPHEN 650 MG: 325 TABLET, FILM COATED ORAL at 20:34

## 2017-11-13 ASSESSMENT — PAIN SCALES - GENERAL
PAINLEVEL_OUTOF10: 0
PAINLEVEL_OUTOF10: 1
PAINLEVEL_OUTOF10: 0
PAINLEVEL_OUTOF10: 1
PAINLEVEL_OUTOF10: 0

## 2017-11-13 ASSESSMENT — PATIENT HEALTH QUESTIONNAIRE - PHQ9
1. LITTLE INTEREST OR PLEASURE IN DOING THINGS: NOT AT ALL
SUM OF ALL RESPONSES TO PHQ QUESTIONS 1-9: 0
SUM OF ALL RESPONSES TO PHQ9 QUESTIONS 1 AND 2: 0
2. FEELING DOWN, DEPRESSED, IRRITABLE, OR HOPELESS: NOT AT ALL

## 2017-11-13 ASSESSMENT — LIFESTYLE VARIABLES
TOTAL SCORE: 0
ALCOHOL_USE: YES
HAVE PEOPLE ANNOYED YOU BY CRITICIZING YOUR DRINKING: NO
ON A TYPICAL DAY WHEN YOU DRINK ALCOHOL HOW MANY DRINKS DO YOU HAVE: 1
DO YOU DRINK ALCOHOL: NO
TOTAL SCORE: 0
EVER_SMOKED: NEVER
AVERAGE NUMBER OF DAYS PER WEEK YOU HAVE A DRINK CONTAINING ALCOHOL: 1
EVER FELT BAD OR GUILTY ABOUT YOUR DRINKING: NO
CONSUMPTION TOTAL: NEGATIVE
EVER HAD A DRINK FIRST THING IN THE MORNING TO STEADY YOUR NERVES TO GET RID OF A HANGOVER: NO
TOTAL SCORE: 0
HAVE YOU EVER FELT YOU SHOULD CUT DOWN ON YOUR DRINKING: NO
HOW MANY TIMES IN THE PAST YEAR HAVE YOU HAD 5 OR MORE DRINKS IN A DAY: 0

## 2017-11-13 ASSESSMENT — COPD QUESTIONNAIRES
DO YOU EVER COUGH UP ANY MUCUS OR PHLEGM?: NO/ONLY WITH OCCASIONAL COLDS OR INFECTIONS
COPD SCREENING SCORE: 2
HAVE YOU SMOKED AT LEAST 100 CIGARETTES IN YOUR ENTIRE LIFE: NO/DON'T KNOW
DURING THE PAST 4 WEEKS HOW MUCH DID YOU FEEL SHORT OF BREATH: NONE/LITTLE OF THE TIME

## 2017-11-13 NOTE — ED NOTES
Chief Complaint   Patient presents with   • Chest Pain     onset about 0600 this morning.  Described as very sharp, low sternum-upper epigastric, constant.    • Shortness of Breath     onset 0600 this morning.      Blood drawn and sent.  Protocol orders placed.

## 2017-11-13 NOTE — ED NOTES
Pt presents to ED from  with c/o CP and SOB; pt reports CP onset this Am; pt took 324ASA @ home, EMS gave 1ntg spray; pt reports CP resolved slightly after Cp, SOB remained

## 2017-11-13 NOTE — ED NOTES
"Pt ambulated to bathroom with no assistance.   Pt has friend/neighbor at bedside.  Pt states mild chest pain. Pt states pain is located \"under my left breast\" pt currently holding left breast.   VSS   Pt resting comfortably.   Call light within reach  Personal belongings in reach  Bathroom and comfort needs met  Will continue to monitor       "

## 2017-11-13 NOTE — ED PROVIDER NOTES
ED Provider Note    Scribed for Rob Alberts D.O. by Dakota Fernandez. 11/13/2017  12:07 PM    Primary care provider: Citlalli Lindsey M.D.  Means of arrival: Ambulance  History obtained from: patient  History limited by: none    CHIEF COMPLAINT  Chief Complaint   Patient presents with   • Chest Pain     onset about 0600 this morning.  Described as very sharp, low sternum-upper epigastric, constant.    • Shortness of Breath     onset 0600 this morning.      HPI  Bernie Barrientos is a 77 y.o. female who presents to the Emergency Department complaining of acute onset and severe left sided chest pain, onset 6 AM today. The patient notes that her pain is below her left breast. Her pain is described as sharp. She states that since the initial episode she has been having an intermittent cramping sensation that has been constant. The patient rates her pain as 1/10 at this time. She has no radiation of her pain to her jaw, arm, back, or abdomen. The patient reports after relaxing her pain somewhat improved but did not resolve. Associated symptoms include shortness of breath. Patient denies vomiting, diarrhea, abdominal pain, back pain, arm pain, or jaw pain.      Cardiac Risk Factors:  Positive Age > 65  Aspirin use every morning.   No prior history of coronary artery disease  No diabetes  No hyperlipidemia   No hypertension  No obesity  No family history of coronary artery disease at a young age <54 yo  No tobacco use   No drugs (methamphetamine or cocaine)  No history of aortic aneurysm   No history of aortic dissection   No history of deep vein thrombosis or pulmonary embolism   No hormone replacement  No oral birth control    REVIEW OF SYSTEMS  Pertinent positives include shortness of breath and chest pain.   Pertinent negatives include no vomiting, diarrhea, abdominal pain, back pain, arm pain, fever, cough, sputum production, or jaw pain.   All other systems reviewed and negative.  C.     PAST MEDICAL HISTORY  Past  Medical History:   Diagnosis Date   • Anesthesia     PONV   • Arthritis 2003    bilat hip and neck surg related to arthritis, bilateral thumbs surgery do to arthritis   • Colon polyp    • Colon polyps    • Dental disorder     upper partial dentures   • DJD (degenerative joint disease)    • Heart valve disease 2005    mitral valve prolapse   • Hypothyroidism    • Major depression 5/2/2013   • Migraine    • Postmenopausal hormone replacement therapy      SURGICAL HISTORY  Past Surgical History:   Procedure Laterality Date   • COLONOSCOPY WITH POLYP  8/15/2013    Performed by Javier Gould M.D. at SURGERY [facility]   • HAND SURGERY  2009    right index finger joint reconstruction   • HAND SURGERY  2004    bilateral thumb reconstruction   • HIP REPLACEMENT, TOTAL  2001    bilateral   • ABDOMINAL HYSTERECTOMY TOTAL  1984   • APPENDECTOMY  1957   • ATHROPLASTY Bilateral     hips   • LAMINOTOMY      4,5,6,7 neck fusion   • OTHER NEUROLOGICAL SURG  2004/2008    spinal fusion C4-7   • TONSILLECTOMY  as child      SOCIAL HISTORY  Social History   Substance Use Topics   • Smoking status: Former Smoker     Packs/day: 1.00     Years: 8.00     Types: Cigarettes     Quit date: 1/1/1980   • Smokeless tobacco: Never Used      Comment: quit 1975   • Alcohol use 0.5 oz/week     1 Glasses of wine per week      Comment: two per week      History   Drug Use No     FAMILY HISTORY  Family History   Problem Relation Age of Onset   • Cancer Mother      colon   • Thyroid Sister    • Diabetes     • Heart Disease Neg Hx    • Hypertension Neg Hx    • Hyperlipidemia Neg Hx      CURRENT MEDICATIONS  No current facility-administered medications on file prior to encounter.      Current Outpatient Prescriptions on File Prior to Encounter   Medication Sig Dispense Refill   • Collagen 500 MG Cap Take 500 mg by mouth every day.     • sertraline (ZOLOFT) 100 MG Tab Take 2 Tabs by mouth every day. 180 Tab 3   • Lactobacillus (PROBIOTIC  "ACIDOPHILUS PO) Take 1 Cap by mouth every day.     • levothyroxine (SYNTHROID) 100 MCG Tab Take 1 Tab by mouth every day. 90 Tab 3   • Multiple Vitamin (MULTI VITAMIN DAILY PO) Take 1 Tab by mouth every day.     • Omega-3 Fatty Acids (OMEGA 3 PO) Take 1 Cap by mouth every day.     • vitamin D (CHOLECALCIFEROL) 1000 UNIT Tab Take 1,000 Units by mouth every day.     • BIOTIN 5000 PO Take 1 Tab by mouth every day.     • Magnesium 250 MG Tab Take 1 Tab by mouth every day.     • Potassium 99 MG Tab Take 1 Tab by mouth every day.     • aspirin 81 MG tablet Take 81 mg by mouth every day.     • Glucosamine HCl (GLUCOSAMINE PO) Take 1 Tab by mouth every day.       ALLERGIES  Allergies   Allergen Reactions   • Naprosyn [Naproxen] Rash   • Pcn [Penicillins]      Reaction when she was a child      PHYSICAL EXAM  VITAL SIGNS: /63   Pulse (!) 56   Temp 36.8 °C (98.3 °F)   Resp 18   Ht 1.778 m (5' 10\")   Wt 70.3 kg (155 lb)   SpO2 97%   BMI 22.24 kg/m²     Nursing notes and vitals reviewed.  Constitutional: Well developed, Well nourished, No acute distress, Non-toxic appearance.   Eyes: PERRLA, EOMI, Conjunctiva normal, No discharge.   Cardiovascular: Normal heart rate, Normal rhythm, No murmurs, No rubs, No gallops. No JVD.    Thorax & Lungs: No respiratory distress, No rales, No rhonchi, No wheezing, Left lateral chest wall tenderness.   Abdomen: Bowel sounds normal, Soft, No tenderness, No guarding, No rebound, No masses, No pulsatile masses.   Skin: Warm, Dry, No erythema, No rash.   Musculoskeletal: Intact distal pulses, No edema, No cyanosis, No clubbing. Good range of motion in all major joints. No tenderness to palpation or major deformities noted, no CVA tenderness, no midline back tenderness.   Neurologic: Alert & oriented x 3, Normal motor function, Normal sensory function, No focal deficits noted.  Psychiatric: Affect normal for clinical presentation.    DIAGNOSTIC STUDIES/PROCEDURES    LABS  Results for " orders placed or performed during the hospital encounter of 11/13/17   Troponin   Result Value Ref Range    Troponin I <0.01 0.00 - 0.04 ng/mL   Btype Natriuretic Peptide   Result Value Ref Range    B Natriuretic Peptide 83 0 - 100 pg/mL   CBC with Differential   Result Value Ref Range    WBC 6.7 4.8 - 10.8 K/uL    RBC 4.70 4.20 - 5.40 M/uL    Hemoglobin 13.6 12.0 - 16.0 g/dL    Hematocrit 40.2 37.0 - 47.0 %    MCV 85.5 81.4 - 97.8 fL    MCH 28.9 27.0 - 33.0 pg    MCHC 33.8 33.6 - 35.0 g/dL    RDW 45.6 35.9 - 50.0 fL    Platelet Count 286 164 - 446 K/uL    MPV 10.0 9.0 - 12.9 fL    Neutrophils-Polys 72.80 (H) 44.00 - 72.00 %    Lymphocytes 16.00 (L) 22.00 - 41.00 %    Monocytes 6.70 0.00 - 13.40 %    Eosinophils 3.30 0.00 - 6.90 %    Basophils 0.90 0.00 - 1.80 %    Immature Granulocytes 0.30 0.00 - 0.90 %    Nucleated RBC 0.00 /100 WBC    Neutrophils (Absolute) 4.91 2.00 - 7.15 K/uL    Lymphs (Absolute) 1.08 1.00 - 4.80 K/uL    Monos (Absolute) 0.45 0.00 - 0.85 K/uL    Eos (Absolute) 0.22 0.00 - 0.51 K/uL    Baso (Absolute) 0.06 0.00 - 0.12 K/uL    Immature Granulocytes (abs) 0.02 0.00 - 0.11 K/uL    NRBC (Absolute) 0.00 K/uL   Complete Metabolic Panel (CMP)   Result Value Ref Range    Sodium 128 (L) 135 - 145 mmol/L    Potassium 3.9 3.6 - 5.5 mmol/L    Chloride 100 96 - 112 mmol/L    Co2 19 (L) 20 - 33 mmol/L    Anion Gap 9.0 0.0 - 11.9    Glucose 100 (H) 65 - 99 mg/dL    Bun 17 8 - 22 mg/dL    Creatinine 0.82 0.50 - 1.40 mg/dL    Calcium 9.2 8.5 - 10.5 mg/dL    AST(SGOT) 17 12 - 45 U/L    ALT(SGPT) 9 2 - 50 U/L    Alkaline Phosphatase 61 30 - 99 U/L    Total Bilirubin 0.4 0.1 - 1.5 mg/dL    Albumin 4.2 3.2 - 4.9 g/dL    Total Protein 6.8 6.0 - 8.2 g/dL    Globulin 2.6 1.9 - 3.5 g/dL    A-G Ratio 1.6 g/dL   Prothrombin Time   Result Value Ref Range    PT 13.8 12.0 - 14.6 sec    INR 1.09 0.87 - 1.13   APTT   Result Value Ref Range    APTT 27.1 24.7 - 36.0 sec   Lipase   Result Value Ref Range    Lipase 24 11 - 82  U/L   D-DIMER   Result Value Ref Range    D-Dimer Screen 361 (H) <250 ng/mL(D-DU)   ESTIMATED GFR   Result Value Ref Range    GFR If African American >60 >60 mL/min/1.73 m 2    GFR If Non African American >60 >60 mL/min/1.73 m 2   EKG (ER)   Result Value Ref Range    Report       [facility] Emergency Dept.    Test Date:  2017  Pt Name:    SHARRON RUIZ                 Department: ER  MRN:        [id number]                      Room:        05  Gender:     F                            Technician: [id number]  :        [id number]                   Requested By:ER TRIAGE PROTOCOL  Order #:    [id number]                    Reading MD:    Measurements  Intervals                                Axis  Rate:       56                           P:          66  MD:         192                          QRS:        -54  QRSD:       100                          T:          63  QT:         456  QTc:        441    Interpretive Statements  SINUS BRADYCARDIA  LEFT ANTERIOR FASCICULAR BLOCK  PROBABLE LEFT VENTRICULAR HYPERTROPHY  ANTERIOR Q WAVES, POSSIBLY DUE TO LVH  Compared to ECG 2013 13:33:18  Left ventricular hypertrophy now present  Q waves now present  Myocardial infarct finding no longer present       All labs reviewed by me.    RADIOLOGY  CT-CTA CHEST PULMONARY ARTERY W/ RECONS   Final Result            1.  No CT evidence of pulmonary emboli      2.  Patchy opacities dependently in the lower lobes consistent with atelectasis.      DX-CHEST-LIMITED (1 VIEW)   Final Result      No acute cardiopulmonary disease.        The radiologist's interpretation of all radiological studies have been reviewed by me.    COURSE & MEDICAL DECISION MAKING  Pertinent Labs & Imaging studies reviewed. (See chart for details)    12:07 PM - Patient seen and examined at bedside. Patient will be treated with Nitro drip. Ordered CT CTA chest, chest x ray, D dimer, estimated GFR, Troponin, BNP, CBC, CMP, Prothrombin time, APTT,  lipase, and EKG to evaluate her symptoms. I discussed with the patient the treatment plan. She understands that she will likely mason to be admitted to the hospital. Patient is agreeable.     1:06 PM - Recheck with the patient. She understands that she had an elevated D dimer at this time and she will need to have a CT PE to rule out PE as this can cause her pain. She understands the treatment plan and is agreeable.       1517: Patient returned from CT scan was negative for pulmonary embolism the patient is comfortable.  This is a charming 77 y.o. female that presents with chest pain sharp in nature. Initial EKG reveals no evidence of ST elevation or depression, she has any ectopy. She has a negative troponin as well. She describes the pain as sharp in nature had slight shortness of breath is concern for possible pulmonary embolism, the patient had an elevated d-dimer therefore CT pulmonary exam was completed. Fortunately, the CT pulmonary valve is negative for pulmonary embolism. The patient initially complained of severe pain she is started on nitroglycerin drip after receiving aspirin. Her blood pressure did decrease from 178/100 down to 140/80 and she is now pain-free. At this point I do not believe she is experiencing ST elevation on S elevation myocardial infarction but cannot completely exclude acute coronary syndrome or an stable angina. I discussed the patient Dr. Webb for admission to the hospital, once again on reevaluation at 1520 she is complaining symptomatic and is hemodynamically stable. Do believe the patient warrants further evaluation for chest discomfort. I do not be she is expressing aortic dissection, aortic aneurysm, pulmonary embolism.     DISPOSITION:  Patient will be admitted to Dr. Webb in stable condition.    FINAL IMPRESSION  Chest pain  Acute coronary syndrome    Dakota LING (Scribe), am scribing for, and in the presence of, Rob Alberts D.O    Electronically signed  by: Dakota Fernandez (Scribe), 11/13/2017    IRob D.O. personally performed the services described in this documentation, as scribed by Dakota Fernandez in my presence, and it is both accurate and complete.    The note accurately reflects work and decisions made by me.  Rob Alberts  11/13/2017  3:19 PM

## 2017-11-13 NOTE — PROGRESS NOTES
"Subjective:   Bernie Barrientos is a 77 y.o. female here today for chest pain.    Patient walked in to our office complaining of acute onset of chest pain at approximately 5-6 AM this morning. She was immediately brought back into an exam room, vitals taken and EKG obtained. She tells me that she \"felt a crushing pain in her chest while dreaming\" and woke up to notice she had severe chest pain. It is located in her sternum. Radiates to her left arm. Associated with shortness of breath and nausea. No lightheadedness. She is accompanied by a neighbor who brought her in to our office.    She took 650mg of aspirin at 7:30 AM.    At her last appointment with me in October 2017, she complained of having chest pain off and on after hearing that a neighbor had passed away. EKG at that time did not show any acute findings and was similar to an EKG from 2013. After discussing her EKG, her discomfort completely resolved. She was also noted to have elevated blood pressure and was scheduled to follow-up in a week for re-evaluation. It appears she canceled that appointment.    Her past medical history is significant for mitral valve prolapse, high cholesterol, elevated fasting glucose and chronic kidney disease.    Current medicines (including changes today)  Current Outpatient Prescriptions   Medication Sig Dispense Refill   • Collagen 500 MG Cap Take 500 mg by mouth every day.     • sertraline (ZOLOFT) 100 MG Tab Take 2 Tabs by mouth every day. 180 Tab 3   • Lactobacillus (PROBIOTIC ACIDOPHILUS PO) Take  by mouth.     • levothyroxine (SYNTHROID) 100 MCG Tab Take 1 Tab by mouth every day. 90 Tab 3   • Multiple Vitamin (MULTI VITAMIN DAILY PO) Take  by mouth.     • Omega-3 Fatty Acids (OMEGA 3 PO) Take  by mouth.     • vitamin D (CHOLECALCIFEROL) 1000 UNIT Tab Take 1,000 Units by mouth every day.     • BIOTIN 5000 PO Take  by mouth.     • Magnesium 250 MG Tab Take  by mouth.     • Potassium 99 MG Tab Take  by mouth.     • aspirin 81 " MG tablet Take 81 mg by mouth every day.     • Glucosamine HCl (GLUCOSAMINE PO) Take  by mouth.       No current facility-administered medications for this visit.      She  has a past medical history of Anesthesia; Arthritis (2003); Colon polyp; Colon polyps; Dental disorder; DJD (degenerative joint disease); Heart valve disease (2005); Hypothyroidism; Major depression (5/2/2013); Migraine; and Postmenopausal hormone replacement therapy. She also has no past medical history of Backpain; Breast cancer (CMS-HCC); COPD; or Fall.    ROS   No fever, no confusion, no cough, no abdominal pain.     Objective:     Physical Exam:  Blood pressure 150/60, pulse 62, resp. rate (!) 24, SpO2 97 %, not currently breastfeeding.   Constitutional: Alert, mild distress secondary to chest pain  Skin: Warm, dry, good turgor, no rashes in visible areas.  Eye: Equal, round and reactive, conjunctiva clear, lids normal.  ENMT: Lips without lesions, good dentition, oropharynx clear.  Neck: Trachea midline, no masses, no thyromegaly. No cervical or supraclavicular lymphadenopathy.  Respiratory: Tachypneic, difficulty taking deep breaths and in mild-moderate respiratory distress. Lungs equal bilaterally.  Cardiovascular: Muffled S1, S2, grossly regular rhythm, no murmur, no edema.  Abdomen: Soft, non-tender, no masses, no hepatosplenomegaly.  Psych: Alert and oriented x3, appropriate affect and mood given her symptoms.    EKG interpretation by me: Sinus bradycardia. HR is 50. Axis is normal. Left anterior fascicular block. Q-waves in anterior leads, appear new. No T-wave inversions. SC and QT intervals are normal. Quality of EKG is good.    Assessment and Plan:     1. Chest pain, unspecified type  2. Shortness of breath  3. Shaking  Patient walked into our office with approximately 5 hours of chest pain. This is new for her. No tachycardia or hypoxia. She does have new Q-waves on EKG in anterior leads. Patient took aspirin this morning. Her  pain has not improved. [facility] called and transported patient to ER for higher level of care.     Followup: After ER evaluation and possible hospitalization.         PLEASE NOTE: This dictation was created using voice recognition software. I have made every reasonable attempt to correct obvious errors, but I expect that there are errors of grammar and possibly content that I did not discover before finalizing the note.

## 2017-11-14 ENCOUNTER — APPOINTMENT (OUTPATIENT)
Dept: RADIOLOGY | Facility: MEDICAL CENTER | Age: 77
End: 2017-11-14
Attending: HOSPITALIST
Payer: MEDICARE

## 2017-11-14 VITALS
OXYGEN SATURATION: 94 % | RESPIRATION RATE: 16 BRPM | HEART RATE: 57 BPM | SYSTOLIC BLOOD PRESSURE: 159 MMHG | WEIGHT: 156.31 LBS | DIASTOLIC BLOOD PRESSURE: 70 MMHG | TEMPERATURE: 97 F | HEIGHT: 70 IN | BODY MASS INDEX: 22.38 KG/M2

## 2017-11-14 LAB
EKG IMPRESSION: NORMAL
TROPONIN I SERPL-MCNC: <0.01 NG/ML (ref 0–0.04)
TROPONIN I SERPL-MCNC: <0.01 NG/ML (ref 0–0.04)

## 2017-11-14 PROCEDURE — G0378 HOSPITAL OBSERVATION PER HR: HCPCS

## 2017-11-14 PROCEDURE — 700111 HCHG RX REV CODE 636 W/ 250 OVERRIDE (IP)

## 2017-11-14 PROCEDURE — A9270 NON-COVERED ITEM OR SERVICE: HCPCS | Performed by: HOSPITALIST

## 2017-11-14 PROCEDURE — A9502 TC99M TETROFOSMIN: HCPCS

## 2017-11-14 PROCEDURE — 700102 HCHG RX REV CODE 250 W/ 637 OVERRIDE(OP): Performed by: HOSPITALIST

## 2017-11-14 PROCEDURE — 99217 PR OBSERVATION CARE DISCHARGE: CPT | Performed by: INTERNAL MEDICINE

## 2017-11-14 PROCEDURE — 84484 ASSAY OF TROPONIN QUANT: CPT

## 2017-11-14 RX ORDER — REGADENOSON 0.08 MG/ML
INJECTION, SOLUTION INTRAVENOUS
Status: COMPLETED
Start: 2017-11-14 | End: 2017-11-14

## 2017-11-14 RX ADMIN — VITAMIN D, TAB 1000IU (100/BT) 1000 UNITS: 25 TAB at 09:33

## 2017-11-14 RX ADMIN — FAMOTIDINE 20 MG: 20 TABLET, FILM COATED ORAL at 09:31

## 2017-11-14 RX ADMIN — SERTRALINE 200 MG: 100 TABLET, FILM COATED ORAL at 09:33

## 2017-11-14 RX ADMIN — LEVOTHYROXINE SODIUM 100 MCG: 100 TABLET ORAL at 09:32

## 2017-11-14 RX ADMIN — ASPIRIN 81 MG: 81 TABLET, CHEWABLE ORAL at 09:31

## 2017-11-14 RX ADMIN — REGADENOSON 0.4 MG: 0.08 INJECTION, SOLUTION INTRAVENOUS at 08:07

## 2017-11-14 RX ADMIN — OMEGA-3 FATTY ACIDS CAP 1000 MG 1000 MG: 1000 CAP at 09:32

## 2017-11-14 ASSESSMENT — PAIN SCALES - GENERAL
PAINLEVEL_OUTOF10: 0
PAINLEVEL_OUTOF10: 0

## 2017-11-14 NOTE — PROGRESS NOTES
Pt resting in bed comfortably, heating pack applied to lower back. denies any SOB/CP. Tele continues: SB at 54 bpm, no ectopy. Lab collected and sent. No requests at this time.

## 2017-11-14 NOTE — H&P
DATE OF SERVICE:  11/13/2017.    REASON FOR EVALUATION:  Chest discomfort.    PRIMARY CARE PROVIDER:  Citlalli Lindsey MD    HISTORY OF PRESENT ILLNESS:  This is a 77-year-old female who was referred   from the urgent care.  She woke up from sleep with chest discomfort.  It was   in the center of her chest.  She said when it occur, there was a 10/10 pain,   lasted for a few minutes.  She was short of breath.  It is in the center of   her chest, squeezing like pain, with no radiation.  She got concerned and   presented to the emergency room.  In the emergency room, her workup was   unremarkable.  Patient referred to me for further care and management.  Chest   pain in the center of her chest, sharp, squeezing like pain, intensity 10/10,   it woke her up from sleep, no radiation.  There was shortness of breath.    REVIEW OF SYSTEMS:  No nausea, no vomiting, no diaphoresis, no palpitations.    No abdominal pain, constipation, or diarrhea.  No headache, no blurry vision,   no swollen nodes, no left leg swelling.  No skin rash.  Other review of   systems is otherwise negative.    PAST MEDICAL HISTORY:  Hypothyroidism, depression, migraines, and arthritis.    PAST SURGICAL HISTORY:  Hysterectomy, hip replacement, hand surgery, spinal   fusion C4-C7, and tonsillectomy.    SOCIAL HISTORY:  Ex-smoker.  She used to smoke 1 pack per day for 8 years,   quit in 1975.  She drinks 1 glass of wine per week, no illicit drugs.    FAMILY HISTORY:  Mother has cancer.  Sister has hypothyroidism.  No history of   coronary artery disease in the family.    ALLERGIES:  TO NAPROSYN AND PENICILLIN.    OUTPATIENT MEDICATIONS:  Zoloft 200 mg daily, Synthroid 100 mcg daily, omega-3   fatty acids 1 cap daily, potassium tablet 99 mg daily.    PHYSICAL EXAMINATION:  VITAL SIGNS:  Blood pressure 140/65, respiratory rate of 18, pulse 64, and   temperature 37.  GENERAL:  This is a pleasant elderly female, not in acute distress.  HEENT:  Sclerae are  anicteric.  Extraocular movements are intact.  Oral mucosa   is moist.  NECK:  No adenopathy appreciated in the cervical or axillary area.  HEART:  S1, S2, regular rate.  No murmurs appreciated.  LUNGS:  Clear.  No rales or wheezing.  ABDOMEN:  Soft, nontender, positive bowel sounds appreciated.  EXTREMITIES:  No edema noted.  No cyanosis or clubbing.  NEUROLOGIC:  She is alert, awake, oriented x3, no focal deficits.    LABORATORY DATA:  White blood cell count of 6, hemoglobin 13, hematocrit 40,   platelet count is 286.  Sodium 128, potassium is 3.9, glucose 100, BUN of 17,   creatinine 0.82.  Troponin I is less than 0.01.  BNP of 83.  X-ray of chest is   clear.  EKG reviewed by me, sinus bradycardia, left anterior fascicular   block, rate of 50.  There are no ST or T wave changes consistent with acute   ischemia.    IMPRESSION:  1.  Chest pain, rule out myocardial infarction.  2.  History of essential hypertension.  3.  History of hypothyroidism.  4.  History of depression.  5.  History of dyslipidemia.    PLAN:  Patient admitted to the clinical decision unit for observation.  Check   troponins x3.  If enzymes negative, patient will go for Persantine-Thallium   stress test in the a.m. If troponins or P-Thal is abnormal, patient will be   referred to cardiology for further recommendations.  Monitor and control blood   pressure.  Check thyroid functions.       ____________________________________     MD MIKE LEPE / YELITZA    DD:  11/14/2017 02:27:02  DT:  11/14/2017 05:05:22    D#:  [id number]  Job#:  [id number]

## 2017-11-14 NOTE — PROGRESS NOTES
Pt A&Ox4, assessment completed. Denies having any SOB or chest pain, states has headache. Order for tylenol received. Tele continues: SB at 55 bpm. Call light in reach.

## 2017-11-14 NOTE — PROGRESS NOTES
Assumed patient care.  Report received from night shift nurseJosselyn.  Pt A&Ox4.  Pt denies any pain at this time.  Pt denies any CP, SOB, nausea/vomitting, palpitations.  Monitors applied, sinus bradycardia noted.  Patient updated on POC, communications board updated.  Needs met, will continue to monitor.    Pt remains NPO for upcoming procedure.

## 2017-11-14 NOTE — PROGRESS NOTES
Patient arrived to unit via wheelchair from main ER at 1615.  Pt's weight obtained on standup scale, see flow sheet.    Admit profile and assessment completed.  Pt current on influenza and pneumococcal vaccine.  Pt A&Ox4.  Respirations even, unlabored on room air.  Pt denies any pain.  Pt denies any CP, SOB, nausea/vomitting, palpitation.  Monitors applied, sinus bradycardia noted, pt asymptomatic.    Bed in locked, lowest position.  Call light and belongings within reach.  Patient updated on POC , communications board updated.  Needs met, will continue to monitor.

## 2017-11-14 NOTE — DISCHARGE INSTRUCTIONS
Discharge Instructions    Discharged to home by car with relative. Discharged via wheelchair, hospital escort: Yes.  Special equipment needed: Not Applicable    Be sure to schedule a follow-up appointment with your primary care doctor or any specialists as instructed.     Discharge Plan:   Diet Plan: Discussed  Activity Level: Discussed  Confirmed Follow up Appointment: Patient to Call and Schedule Appointment  Confirmed Symptoms Management: Discussed  Medication Reconciliation Updated: Yes  Influenza Vaccine Indication: Not indicated: Previously immunized this influenza season and > 8 years of age    I understand that a diet low in cholesterol, fat, and sodium is recommended for good health. Unless I have been given specific instructions below for another diet, I accept this instruction as my diet prescription.   Other diet: Heart healthy     Special Instructions: None    · Is patient discharged on Warfarin / Coumadin?   No     · Is patient Post Blood Transfusion?  No    Depression / Suicide Risk    As you are discharged from this [facility] facility, it is important to learn how to keep safe from harming yourself.    Recognize the warning signs:  · Abrupt changes in personality, positive or negative- including increase in energy   · Giving away possessions  · Change in eating patterns- significant weight changes-  positive or negative  · Change in sleeping patterns- unable to sleep or sleeping all the time   · Unwillingness or inability to communicate  · Depression  · Unusual sadness, discouragement and loneliness  · Talk of wanting to die  · Neglect of personal appearance   · Rebelliousness- reckless behavior  · Withdrawal from people/activities they love  · Confusion- inability to concentrate     If you or a loved one observes any of these behaviors or has concerns about self-harm, here's what you can do:  · Talk about it- your feelings and reasons for harming yourself  · Remove any means that you might use  to hurt yourself (examples: pills, rope, extension cords, firearm)  · Get professional help from the community (Mental Health, Substance Abuse, psychological counseling)  · Do not be alone:Call your Safe Contact- someone whom you trust who will be there for you.  · Call your local CRISIS HOTLINE 675-3228 or 872-833-6698  · Call your local Children's Mobile Crisis Response Team [place] (785) 898-2724 or www.[employer]  · Call the toll free National Suicide Prevention Hotlines   · National Suicide Prevention Lifeline 082-374-CYZD (1554)  · National Hope Line Network 800-SUICIDE (806-4502)

## 2017-11-14 NOTE — DISCHARGE SUMMARY
CHIEF COMPLAINT ON ADMISSION  Chief Complaint   Patient presents with   • Chest Pain     onset about 0600 this morning.  Described as very sharp, low sternum-upper epigastric, constant.    • Shortness of Breath     onset 0600 this morning.        CODE STATUS  Full Code    HPI & HOSPITAL COURSE  This is a 77 y.o. female here with chest pain. Her initial trop and ekg were negative. Her chest pain has resolved. Had stress test this am with normal result. Per patient she is at her baseline and like to be discharged home today. Instructed to come back to ER if symptom get worse.     Therefore, she is discharged in fair and stable condition with close outpatient follow-up.    SPECIFIC OUTPATIENT FOLLOW-UP      DISCHARGE PROBLEM LIST  Active Problems:    Chest pain POA: Unknown  Resolved Problems:    * No resolved hospital problems. *      FOLLOW UP  Future Appointments  Date Time Provider Department Center   1/15/2018 2:00 PM Citlalli Lindsey M.D. [facility] None     Citlalli Lindsey M.D.  [address]  [place] [id number]  [phone]          Citlalli Lindsey M.D.  [address]  [place] [id number]  [phone]    In 1 week        MEDICATIONS ON DISCHARGE   Bernie Barrientos   Home Medication Instructions [id number]    Printed on:11/14/17 3825   Medication Information                      aspirin 81 MG tablet  Take 81 mg by mouth every day.             BIOTIN 5000 PO  Take 1 Tab by mouth every day.             Collagen 500 MG Cap  Take 500 mg by mouth every day.             Glucosamine HCl (GLUCOSAMINE PO)  Take 1 Tab by mouth every day.             Lactobacillus (PROBIOTIC ACIDOPHILUS PO)  Take 1 Cap by mouth every day.             levothyroxine (SYNTHROID) 100 MCG Tab  Take 1 Tab by mouth every day.             Magnesium 250 MG Tab  Take 1 Tab by mouth every day.             Multiple Vitamin (MULTI VITAMIN DAILY PO)  Take 1 Tab by mouth every day.             Omega-3 Fatty Acids (OMEGA 3 PO)  Take 1 Cap by mouth  every day.             Potassium 99 MG Tab  Take 1 Tab by mouth every day.             sertraline (ZOLOFT) 100 MG Tab  Take 2 Tabs by mouth every day.             vitamin D (CHOLECALCIFEROL) 1000 UNIT Tab  Take 1,000 Units by mouth every day.                 DIET  Orders Placed This Encounter   Procedures   • Diet Order     Standing Status:   Standing     Number of Occurrences:   1     Order Specific Question:   Diet:     Answer:   Cardiac [6]       ACTIVITY  As tolerated.  Weight bearing as tolerated      CONSULTATIONS      PROCEDURES      LABORATORY  Lab Results   Component Value Date/Time    SODIUM 128 (L) 11/13/2017 11:48 AM    POTASSIUM 3.9 11/13/2017 11:48 AM    CHLORIDE 100 11/13/2017 11:48 AM    CO2 19 (L) 11/13/2017 11:48 AM    GLUCOSE 100 (H) 11/13/2017 11:48 AM    BUN 17 11/13/2017 11:48 AM    CREATININE 0.82 11/13/2017 11:48 AM    CREATININE 1.2 02/26/2008 02:48 PM        Lab Results   Component Value Date/Time    WBC 6.7 11/13/2017 11:48 AM    HEMOGLOBIN 13.6 11/13/2017 11:48 AM    HEMATOCRIT 40.2 11/13/2017 11:48 AM    PLATELETCT 286 11/13/2017 11:48 AM        Total time of the discharge process exceeds 40 minutes

## 2017-11-14 NOTE — PROGRESS NOTES
IV dc'd.  Discharge instructions given to patient; patient verbalizes understanding, all questions answered.  Copy of DC summary provided, signed copy in chart.  Patient awaiting ride

## 2017-11-14 NOTE — PROGRESS NOTES
Pt states personal belongings are in possession.  Pt escorted off unit by tech without incident.

## 2017-11-21 ENCOUNTER — OFFICE VISIT (OUTPATIENT)
Dept: MEDICAL GROUP | Facility: PHYSICIAN GROUP | Age: 77
End: 2017-11-21
Payer: MEDICARE

## 2017-11-21 VITALS
HEIGHT: 70 IN | RESPIRATION RATE: 16 BRPM | BODY MASS INDEX: 22.76 KG/M2 | OXYGEN SATURATION: 96 % | DIASTOLIC BLOOD PRESSURE: 62 MMHG | TEMPERATURE: 98.3 F | SYSTOLIC BLOOD PRESSURE: 140 MMHG | HEART RATE: 70 BPM | WEIGHT: 159 LBS

## 2017-11-21 DIAGNOSIS — R07.89 OTHER CHEST PAIN: ICD-10-CM

## 2017-11-21 DIAGNOSIS — R03.0 ELEVATED BLOOD PRESSURE READING: ICD-10-CM

## 2017-11-21 DIAGNOSIS — E78.00 PURE HYPERCHOLESTEROLEMIA: ICD-10-CM

## 2017-11-21 DIAGNOSIS — M54.32 SCIATICA OF LEFT SIDE: ICD-10-CM

## 2017-11-21 DIAGNOSIS — E03.9 ACQUIRED HYPOTHYROIDISM: ICD-10-CM

## 2017-11-21 PROBLEM — Z91.81 RISK FOR FALLS: Status: RESOLVED | Noted: 2017-10-17 | Resolved: 2017-11-21

## 2017-11-21 PROCEDURE — 99214 OFFICE O/P EST MOD 30 MIN: CPT | Performed by: FAMILY MEDICINE

## 2017-11-21 ASSESSMENT — PAIN SCALES - GENERAL: PAINLEVEL: NO PAIN

## 2017-11-21 ASSESSMENT — PATIENT HEALTH QUESTIONNAIRE - PHQ9: CLINICAL INTERPRETATION OF PHQ2 SCORE: 0

## 2017-11-22 NOTE — ASSESSMENT & PLAN NOTE
"Hospitalized 11/13-11/14/2017 for acute onset of chest pain and shortness of breath. Patient had presented to our office and [facility] She had a thorough work-up including labs, EKG, CT-PE and nuclear medicine stress test. The work-up was unremarkable and stress test was negative for ischemia or infarction. Her symptoms improved and she was discharged.     Today, she tells me that she is feeling \"well.\" Denies chest pain or shortness of breath.  "

## 2017-11-22 NOTE — PROGRESS NOTES
"Subjective:   Bernie Barrientos is a 77 y.o. female here today for hospital follow-up for chest pain.    Chest pain  Hospitalized 11/13-11/14/2017 for acute onset of chest pain and shortness of breath. Patient had presented to our office and was transported to [facility] via [facility]. She had a thorough work-up including labs, EKG, CT-PE and nuclear medicine stress test. The work-up was unremarkable and stress test was negative for ischemia or infarction. Her symptoms improved and she was discharged.     Today, she tells me that she is feeling \"well.\" Denies chest pain or shortness of breath.    Sciatica of left side  Stable. Patient mentions that it was recommended to her that she have an MRI of her back. She denies any new injury or trauma. She does have pain in her lower back that travels down her left leg intermittently, but it has not worsened. She would like to continue managing the pain with exercise, stretching and anti-inflammatories.    Elevated blood pressure reading  BP stable today at 140/62. No headaches, vision changes, shortness of breath or chest pain.    Pure hypercholesterolemia  Slight elevations of total cholesterol and LDL noted on lab results from patient's recent hospitalizations. She is not on medication.    Acquired hypothyroidism  Labwork reviewed and up to date. Taking medicine as directed. Denies palpitations, skin changes, temperature intolerance, changes in bowel habits.     Current medicines (including changes today)  Current Outpatient Prescriptions   Medication Sig Dispense Refill   • Collagen 500 MG Cap Take 500 mg by mouth every day.     • sertraline (ZOLOFT) 100 MG Tab Take 2 Tabs by mouth every day. 180 Tab 3   • Lactobacillus (PROBIOTIC ACIDOPHILUS PO) Take 1 Cap by mouth every day.     • levothyroxine (SYNTHROID) 100 MCG Tab Take 1 Tab by mouth every day. 90 Tab 3   • Multiple Vitamin (MULTI VITAMIN DAILY PO) Take 1 Tab by mouth every day.     • Omega-3 Fatty Acids (OMEGA 3 PO) Take 1 " "Cap by mouth every day.     • vitamin D (CHOLECALCIFEROL) 1000 UNIT Tab Take 1,000 Units by mouth every day.     • BIOTIN 5000 PO Take 1 Tab by mouth every day.     • Magnesium 250 MG Tab Take 1 Tab by mouth every day.     • Potassium 99 MG Tab Take 1 Tab by mouth every day.     • aspirin 81 MG tablet Take 81 mg by mouth every day.     • Glucosamine HCl (GLUCOSAMINE PO) Take 1 Tab by mouth every day.       No current facility-administered medications for this visit.      She  has a past medical history of Anesthesia; Arthritis (2003); Colon polyp; Colon polyps; Dental disorder; DJD (degenerative joint disease); Heart valve disease (2005); Hypothyroidism; Major depression (5/2/2013); Migraine; and Postmenopausal hormone replacement therapy. She also has no past medical history of Backpain; Breast cancer (CMS-HCC); COPD; or Fall.    ROS   No chest pain, no shortness of breath, no abdominal pain.     Objective:     Physical Exam:  Blood pressure 140/62, pulse 70, temperature 36.8 °C (98.3 °F), resp. rate 16, height 1.778 m (5' 10\"), weight 72.1 kg (159 lb), SpO2 96 %, not currently breastfeeding. Body mass index is 22.81 kg/m².   Constitutional: Alert, no distress.  Skin: Warm, dry, good turgor, no rashes in visible areas.  Eye: Equal, round and reactive, conjunctiva clear, lids normal.  ENMT: Lips without lesions, good dentition, oropharynx clear.  Neck: Trachea midline, no masses, no thyromegaly.  Respiratory: Unlabored respiratory effort, lungs clear to auscultation, no wheezes, no ronchi.  Cardiovascular: Normal S1, S2, soft systolic murmur, no edema.  Abdomen: Soft, non-tender, no masses, no hepatosplenomegaly.  Psych: Alert and oriented x3, normal affect and mood.    Assessment and Plan:     1. Other chest pain  Resolved. Hospital records reviewed. Reassuring work-up. Monitor.    2. Sciatica of left side  Chronic and stable. We agreed to continue conservative management. No imaging indicated at this time.    3. " Elevated blood pressure reading  BP stable without medications. Will continue to monitor.    4. Pure hypercholesterolemia  Slightly elevated total and LDL cholesterol. Continue to monitor off medications.    5. Acquired hypothyroidism  Continue thyroid medication. Instructed patient to take on empty stomach with glass of water, 30 minutes prior to food or other medications. Labs as indicated.    Followup: Return in about 5 months (around 4/21/2018) for f/u lab results, short.         PLEASE NOTE: This dictation was created using voice recognition software. I have made every reasonable attempt to correct obvious errors, but I expect that there are errors of grammar and possibly content that I did not discover before finalizing the note.

## 2017-11-22 NOTE — ASSESSMENT & PLAN NOTE
Slight elevations of total cholesterol and LDL noted on lab results from patient's recent hospitalizations. She is not on medication.

## 2017-11-22 NOTE — ASSESSMENT & PLAN NOTE
Labwork reviewed and up to date. Taking medicine as directed. Denies palpitations, skin changes, temperature intolerance, changes in bowel habits.

## 2017-11-22 NOTE — ASSESSMENT & PLAN NOTE
Stable. Patient mentions that it was recommended to her that she have an MRI of her back. She denies any new injury or trauma. She does have pain in her lower back that travels down her left leg intermittently, but it has not worsened. She would like to continue managing the pain with exercise, stretching and anti-inflammatories.

## 2017-12-26 ENCOUNTER — TELEPHONE (OUTPATIENT)
Dept: MEDICAL GROUP | Facility: PHYSICIAN GROUP | Age: 77
End: 2017-12-26

## 2017-12-26 RX ORDER — LEVOTHYROXINE SODIUM 0.1 MG/1
100 TABLET ORAL DAILY
Qty: 90 TAB | Refills: 3 | Status: SHIPPED | OUTPATIENT
Start: 2017-12-26 | End: 2018-12-18 | Stop reason: SDUPTHER

## 2017-12-26 NOTE — TELEPHONE ENCOUNTER
Pt came in today to see if she can get her Levothyroxine called in to [facility] on [address]. Pt took her last pill yesterday.

## 2018-02-27 ENCOUNTER — PATIENT OUTREACH (OUTPATIENT)
Dept: HEALTH INFORMATION MANAGEMENT | Facility: OTHER | Age: 78
End: 2018-02-27

## 2018-02-28 NOTE — PROGRESS NOTES
1. Attempt #: 1    2. HealthConnect Verified: yes    3. Verify PCP: yes    4. Care Team Updated:       •   DME Company (gait device, O2, CPAP, etc.): YES       •   Other Specialists (eye doctor, derm, GYN, cardiology, endo, etc): YES    5.  Reviewed/Updated the following with patient:       •   Communication Preference Obtained? YES       •   Preferred Pharmacy? YES       •   Preferred Lab? YES       •   Family History (document living status of immediate family members and if + hx of cancer, diabetes, hypertension, hyperlipidemia, heart attack, stroke) YES. Was Abstract Encounter opened and chart updated? YES    6. [employer] Activation: already active    7. [employer] Carley: no    8. Annual Wellness Visit Scheduling  Scheduling Status:Scheduled      9. Care Gap Scheduling (Attempt to Schedule EACH Overdue Care Gap!)     There are no preventive care reminders to display for this patient.     Scheduled patient for Annual Wellness Visit    10. Patient was advised: “This is a free wellness visit. The provider will screen for medical conditions to help you stay healthy. If you have other concerns to address you may be asked to discuss these at a separate visit or there may be an additional fee.”     11. Patient was informed to arrive 15 min prior to their scheduled appointment and bring in their medication bottles.

## 2018-03-12 DIAGNOSIS — F33.0 MAJOR DEPRESSIVE DISORDER, RECURRENT EPISODE, MILD (HCC): ICD-10-CM

## 2018-03-12 RX ORDER — SERTRALINE HYDROCHLORIDE 100 MG/1
200 TABLET, FILM COATED ORAL DAILY
Qty: 180 TAB | Refills: 3 | Status: SHIPPED | OUTPATIENT
Start: 2018-03-12 | End: 2019-04-15 | Stop reason: SDUPTHER

## 2018-04-09 ENCOUNTER — TELEPHONE (OUTPATIENT)
Dept: MEDICAL GROUP | Facility: PHYSICIAN GROUP | Age: 78
End: 2018-04-09

## 2018-04-09 NOTE — TELEPHONE ENCOUNTER
PVP WITH OUTREACH  Future Appointments       Provider Department [place]    4/16/2018 11:40 AM Citlalli Lindsey M.D.; [facility]  [facility] [place]     4/27/2018 9:00 AM Citlalli Lindsey M.D. [facility]           ANNUAL WELLNESS VISIT PRE-VISIT PLANNING     1.  Immunizations were updated in Epic using WebIZ?: Yes       •  WebIZ Recommendations: ZOSTAVAX (Shingles)       •  Is patient due for Tdap? NO       •  Is patient due for Shingles? NO     2.  Specialty Comments was updated with diagnosis information provided by SCP: NO

## 2018-04-14 ENCOUNTER — HOSPITAL ENCOUNTER (OUTPATIENT)
Dept: LAB | Facility: MEDICAL CENTER | Age: 78
End: 2018-04-14
Attending: FAMILY MEDICINE
Payer: MEDICARE

## 2018-04-14 DIAGNOSIS — E03.9 ACQUIRED HYPOTHYROIDISM: ICD-10-CM

## 2018-04-14 DIAGNOSIS — E78.00 PURE HYPERCHOLESTEROLEMIA: ICD-10-CM

## 2018-04-14 DIAGNOSIS — R73.01 ELEVATED FASTING GLUCOSE: ICD-10-CM

## 2018-04-14 LAB
ALBUMIN SERPL BCP-MCNC: 3.9 G/DL (ref 3.2–4.9)
ALBUMIN/GLOB SERPL: 1.4 G/DL
ALP SERPL-CCNC: 47 U/L (ref 30–99)
ALT SERPL-CCNC: 11 U/L (ref 2–50)
ANION GAP SERPL CALC-SCNC: 6 MMOL/L (ref 0–11.9)
AST SERPL-CCNC: 16 U/L (ref 12–45)
BILIRUB SERPL-MCNC: 0.4 MG/DL (ref 0.1–1.5)
BUN SERPL-MCNC: 16 MG/DL (ref 8–22)
CALCIUM SERPL-MCNC: 9.9 MG/DL (ref 8.5–10.5)
CHLORIDE SERPL-SCNC: 105 MMOL/L (ref 96–112)
CHOLEST SERPL-MCNC: 176 MG/DL (ref 100–199)
CO2 SERPL-SCNC: 27 MMOL/L (ref 20–33)
CREAT SERPL-MCNC: 0.93 MG/DL (ref 0.5–1.4)
GLOBULIN SER CALC-MCNC: 2.7 G/DL (ref 1.9–3.5)
GLUCOSE SERPL-MCNC: 98 MG/DL (ref 65–99)
HDLC SERPL-MCNC: 79 MG/DL
LDLC SERPL CALC-MCNC: 83 MG/DL
POTASSIUM SERPL-SCNC: 4.4 MMOL/L (ref 3.6–5.5)
PROT SERPL-MCNC: 6.6 G/DL (ref 6–8.2)
SODIUM SERPL-SCNC: 138 MMOL/L (ref 135–145)
TRIGL SERPL-MCNC: 71 MG/DL (ref 0–149)
TSH SERPL DL<=0.005 MIU/L-ACNC: 1.61 UIU/ML (ref 0.38–5.33)

## 2018-04-14 PROCEDURE — 84443 ASSAY THYROID STIM HORMONE: CPT

## 2018-04-14 PROCEDURE — 36415 COLL VENOUS BLD VENIPUNCTURE: CPT

## 2018-04-14 PROCEDURE — 80053 COMPREHEN METABOLIC PANEL: CPT

## 2018-04-14 PROCEDURE — 80061 LIPID PANEL: CPT

## 2018-04-15 PROBLEM — R03.0 ELEVATED BLOOD PRESSURE READING: Status: RESOLVED | Noted: 2017-10-17 | Resolved: 2018-04-15

## 2018-04-15 PROBLEM — R07.9 CHEST PAIN: Status: RESOLVED | Noted: 2017-11-13 | Resolved: 2018-04-15

## 2018-04-15 PROBLEM — M54.32 SCIATICA OF LEFT SIDE: Status: RESOLVED | Noted: 2017-09-14 | Resolved: 2018-04-15

## 2018-04-15 PROBLEM — R73.01 ELEVATED FASTING GLUCOSE: Status: RESOLVED | Noted: 2017-06-02 | Resolved: 2018-04-15

## 2018-04-16 ENCOUNTER — OFFICE VISIT (OUTPATIENT)
Dept: MEDICAL GROUP | Facility: PHYSICIAN GROUP | Age: 78
End: 2018-04-16
Payer: MEDICARE

## 2018-04-16 VITALS
BODY MASS INDEX: 23.25 KG/M2 | OXYGEN SATURATION: 91 % | WEIGHT: 156.97 LBS | HEIGHT: 69 IN | DIASTOLIC BLOOD PRESSURE: 68 MMHG | RESPIRATION RATE: 15 BRPM | TEMPERATURE: 97.8 F | SYSTOLIC BLOOD PRESSURE: 104 MMHG | HEART RATE: 75 BPM

## 2018-04-16 DIAGNOSIS — R01.1 HEART MURMUR: ICD-10-CM

## 2018-04-16 DIAGNOSIS — M17.11 OSTEOARTHRITIS OF RIGHT KNEE, UNSPECIFIED OSTEOARTHRITIS TYPE: ICD-10-CM

## 2018-04-16 DIAGNOSIS — Z91.81 RISK FOR FALLS: ICD-10-CM

## 2018-04-16 DIAGNOSIS — E78.00 PURE HYPERCHOLESTEROLEMIA: ICD-10-CM

## 2018-04-16 DIAGNOSIS — E03.9 ACQUIRED HYPOTHYROIDISM: ICD-10-CM

## 2018-04-16 DIAGNOSIS — J30.1 ACUTE SEASONAL ALLERGIC RHINITIS DUE TO POLLEN: ICD-10-CM

## 2018-04-16 DIAGNOSIS — Z00.00 MEDICARE ANNUAL WELLNESS VISIT, SUBSEQUENT: ICD-10-CM

## 2018-04-16 DIAGNOSIS — F33.41 RECURRENT MAJOR DEPRESSIVE DISORDER, IN PARTIAL REMISSION (HCC): ICD-10-CM

## 2018-04-16 DIAGNOSIS — N18.30 STAGE 3 CHRONIC KIDNEY DISEASE (HCC): ICD-10-CM

## 2018-04-16 PROCEDURE — G0439 PPPS, SUBSEQ VISIT: HCPCS | Performed by: FAMILY MEDICINE

## 2018-04-16 ASSESSMENT — ACTIVITIES OF DAILY LIVING (ADL): BATHING_REQUIRES_ASSISTANCE: 0

## 2018-04-16 ASSESSMENT — PATIENT HEALTH QUESTIONNAIRE - PHQ9
SUM OF ALL RESPONSES TO PHQ QUESTIONS 1-9: 15
5. POOR APPETITE OR OVEREATING: 2 - MORE THAN HALF THE DAYS
CLINICAL INTERPRETATION OF PHQ2 SCORE: 6

## 2018-04-16 NOTE — PROGRESS NOTES
Chief Complaint   Patient presents with   • Annual Wellness Visit         HPI:  Bernie is a 77 y.o. here for Medicare Annual Wellness Visit.        Patient Active Problem List    Diagnosis Date Noted   • DJD (degenerative joint disease)      Priority: Low   • Heart murmur 09/14/2017   • Seasonal allergic rhinitis due to pollen 06/02/2017   • Pure hypercholesterolemia 06/02/2017   • Recurrent major depressive disorder, in partial remission (CMS-HCC) 09/15/2016   • Acquired hypothyroidism 11/30/2015   • CKD (chronic kidney disease) 11/30/2015       Current Outpatient Prescriptions   Medication Sig Dispense Refill   • sertraline (ZOLOFT) 100 MG Tab Take 2 Tabs by mouth every day. 180 Tab 3   • levothyroxine (SYNTHROID) 100 MCG Tab Take 1 Tab by mouth every day. 90 Tab 3   • Collagen 500 MG Cap Take 500 mg by mouth every day.     • Lactobacillus (PROBIOTIC ACIDOPHILUS PO) Take 1 Cap by mouth every day.     • Multiple Vitamin (MULTI VITAMIN DAILY PO) Take 1 Tab by mouth every day.     • Omega-3 Fatty Acids (OMEGA 3 PO) Take 1 Cap by mouth every day.     • vitamin D (CHOLECALCIFEROL) 1000 UNIT Tab Take 1,000 Units by mouth every day.     • BIOTIN 5000 PO Take 1 Tab by mouth every day.     • Magnesium 250 MG Tab Take 1 Tab by mouth every day.     • Potassium 99 MG Tab Take 1 Tab by mouth every day.     • aspirin 81 MG tablet Take 81 mg by mouth every day.     • Glucosamine HCl (GLUCOSAMINE PO) Take 1 Tab by mouth every day.       No current facility-administered medications for this visit.         Patient is taking medications as noted in medication list.  Current supplements as per medication list.     Allergies: Naprosyn [naproxen] and Pcn [penicillins]    Current social contact/activities: gardening, friends    Patient's perception of their health: good    Is patient current with immunizations? Yes.    She  reports that she quit smoking about 38 years ago. Her smoking use included Cigarettes. She has a 8.00 pack-year  smoking history. She has never used smokeless tobacco. She reports that she drinks about 0.5 oz of alcohol per week . She reports that she does not use drugs.  Counseling given: Not Answered      DPA/Advanced directive: Patient has Advanced Directive on file.     ROS:    Gait: Uses no assistive device   Ostomy: No   Other tubes: No   Amputations: No   Chronic oxygen use: No   Last eye exam: 03/18   Wears hearing aids: No   : Reports urinary leakage during the last 6 months that has not interfered at all with their daily activities or sleep.    Screening:    Depression Screening  Little interest or pleasure in doing things?  3 - nearly every day  Feeling down, depressed, or hopeless? 3 - nearly every day  Trouble falling or staying asleep, or sleeping too much?  2 - more than half the days  Feeling tired or having little energy?  3 - nearly every day  Poor appetite or overeating?  2 - more than half the days  Feeling bad about yourself - or that you are a failure or have let yourself or your family down? 0 - not at all  Trouble concentrating on things, such as reading the newspaper or watching television? 2 - more than half the days  Moving or speaking so slowly that other people could have noticed.  Or the opposite - being so fidgety or restless that you have been moving around a lot more than usual?  0 - not at all  Thoughts that you would be better off dead, or of hurting yourself?  0 - not at all  Patient Health Questionnaire Score: 15    If depressive symptoms identified deferred to follow up visit unless specifically addressed in assessment and plan.    Interpretation of PHQ-9 Total Score   Score Severity   1-4 No Depression   5-9 Mild Depression   10-14 Moderate Depression   15-19 Moderately Severe Depression   20-27 Severe Depression    Screening for Cognitive Impairment  Three Minute Recall (apple, watch, bryan)  3/3 Banana, Waterproof, Fence.  Draw clock face with all 12 numbers set to the hand to show 10  minutes past 11 o'clock  1 Time 10:10  5/5  If cognitive concerns identified, deferred for follow up unless specifically addressed in assessment and plan.    Fall Risk Assessment  Has the patient had two or more falls in the last year or any fall with injury in the last year?  Yes  If fall risk identified, deferred for follow up unless specifically addressed in assessment and plan.    Safety Assessment  Throw rugs on floor.  Yes  Handrails on all stairs.  Yes  Good lighting in all hallways.  Yes  Difficulty hearing.  No  Patient counseled about all safety risks that were identified.    Functional Assessment ADLs  Are there any barriers preventing you from cooking for yourself or meeting nutritional needs?  No.    Are there any barriers preventing you from driving safely or obtaining transportation?  No.    Are there any barriers preventing you from using a telephone or calling for help?  No.    Are there any barriers preventing you from shopping?  No.    Are there any barriers preventing you from taking care of your own finances?  No.    Are there any barriers preventing you from managing your medications?    No.    Are there any barriers preventing you from showering, bathing or dressing yourself?  No.    Are you currently engaging any exercise or physical activity?  No.       Health Maintenance Summary                Annual Wellness Visit Next Due 4/17/2019      Done 4/16/2018 Visit Dx: Medicare annual wellness visit, subsequent     Patient has more history with this topic...    BONE DENSITY Next Due 2/21/2022      Done 2/21/2017 DS-BONE DENSITY STUDY (DEXA)     Patient has more history with this topic...    COLONOSCOPY Next Due 9/11/2024      Not specified 9/11/2014      Patient has more history with this topic...    IMM DTaP/Tdap/Td Vaccine Next Due 12/4/2024      Done 12/4/2014 Imm Admin: Tdap Vaccine          Patient Care Team:  Citlalli Lindsey M.D. as PCP - General (Family Medicine)  Clem Harrison M.D. as  "Consulting Physician (Ophthalmology)  Javier Gould M.D. (Gastroenterology)      Social History   Substance Use Topics   • Smoking status: Former Smoker     Packs/day: 1.00     Years: 8.00     Types: Cigarettes     Quit date: 1/1/1980   • Smokeless tobacco: Never Used      Comment: quit 1975   • Alcohol use 0.5 oz/week     1 Glasses of wine per week      Comment: two per week     Family History   Problem Relation Age of Onset   • Cancer Mother      colon   • Diabetes Mother    • Thyroid Sister    • No Known Problems Father      Passed away before the pt was born    • Diabetes     • Heart Disease Neg Hx    • Hypertension Neg Hx    • Hyperlipidemia Neg Hx      She  has a past medical history of Anesthesia; Arthritis (2003); Colon polyp; Colon polyps; Dental disorder; DJD (degenerative joint disease); Heart valve disease (2005); Hypothyroidism; Major depression (5/2/2013); Migraine; and Postmenopausal hormone replacement therapy. She also has no past medical history of Backpain; Breast cancer (CMS-HCC); COPD; or Fall.   Past Surgical History:   Procedure Laterality Date   • COLONOSCOPY WITH POLYP  8/15/2013    Performed by Javier Gould M.D. at [facility]   • HAND SURGERY  2009    right index finger joint reconstruction   • HAND SURGERY  2004    bilateral thumb reconstruction   • HIP REPLACEMENT, TOTAL  2001    bilateral   • ABDOMINAL HYSTERECTOMY TOTAL  1984   • APPENDECTOMY  1957   • ATHROPLASTY Bilateral     hips   • LAMINOTOMY      4,5,6,7 neck fusion   • OTHER NEUROLOGICAL SURG  2004/2008    spinal fusion C4-7   • TONSILLECTOMY  as child       Exam:   Blood pressure 104/68, pulse 75, temperature 36.6 °C (97.8 °F), resp. rate 15, height 1.753 m (5' 9\"), weight 71.2 kg (156 lb 15.5 oz), SpO2 91 %. Body mass index is 23.18 kg/m².    Hearing excellent.    Dentition good.  Alert, oriented in no acute distress.  Eye contact is good, speech goal directed, affect calm.    Assessment and Plan. The " following treatment and monitoring plan is recommended:    1. Medicare annual wellness visit, subsequent  HRA reviewed and appropriate. Patient's previous medical history, healthcare maintenance and immunization status reviewed. See discussion of anticipatory guidance and individual problems below. Patient will return annually for Medicare annual well visit.    2. Pure hypercholesterolemia  This is chronic and stable issue for the patient. Managing with diet and lifestyle changes. We'll continue to monitor.    3. Heart murmur  This is a chronic and stable issue for the patient. She is asymptomatic. We'll continue to monitor with exams.    4. Stage 3 chronic kidney disease  This is a chronic and stable problem. Labs reviewed with the patient. Discussed avoidance of nephrotoxic drugs. Continue hypertensive management. Continue to monitor.   5. Acquired hypothyroidism  Chronic and stable. Recent labs are within normal limits. Continue current dose of levothyroxine.   6. Recurrent major depressive disorder, in partial remission (CMS-HCC)  Patient has been identified as being depressed and appropriate orders and counseling have been given   7. Osteoarthritis of right knee, unspecified osteoarthritis type  Chronic and stable. Continue conservative management.   8. Acute seasonal allergic rhinitis due to pollen  Chronic and stable. Continue use of over-the-counter allergy medications as needed.    9. Risk for falls  Patient identified as fall risk.  Appropriate orders and counseling given.     Services suggested: No services needed at this time  Health Care Screening: Age-appropriate preventive services recommended by USPTF and ACIP covered by Medicare were discussed today. Services ordered if indicated and agreed upon by the patient.  Referrals offered: PT/OT/Nutrition counseling/Behavioral Health/Smoking cessation as per orders if indicated.    Discussion today about general wellness and lifestyle habits:    · Prevent  falls and reduce trip hazards; Cautioned about securing or removing rugs.  · Have a working fire alarm and carbon monoxide detector;   · Engage in regular physical activity and social activities     Follow-up: 2 weeks for follow-up depression

## 2018-04-27 ENCOUNTER — OFFICE VISIT (OUTPATIENT)
Dept: MEDICAL GROUP | Facility: PHYSICIAN GROUP | Age: 78
End: 2018-04-27
Payer: MEDICARE

## 2018-04-27 ENCOUNTER — HOSPITAL ENCOUNTER (OUTPATIENT)
Dept: LAB | Facility: MEDICAL CENTER | Age: 78
End: 2018-04-27
Attending: FAMILY MEDICINE
Payer: MEDICARE

## 2018-04-27 VITALS
WEIGHT: 158 LBS | BODY MASS INDEX: 23.4 KG/M2 | DIASTOLIC BLOOD PRESSURE: 72 MMHG | OXYGEN SATURATION: 94 % | TEMPERATURE: 98 F | RESPIRATION RATE: 16 BRPM | HEIGHT: 69 IN | HEART RATE: 57 BPM | SYSTOLIC BLOOD PRESSURE: 132 MMHG

## 2018-04-27 DIAGNOSIS — M54.32 SCIATICA OF LEFT SIDE: ICD-10-CM

## 2018-04-27 DIAGNOSIS — R53.83 OTHER FATIGUE: ICD-10-CM

## 2018-04-27 DIAGNOSIS — F33.41 RECURRENT MAJOR DEPRESSIVE DISORDER, IN PARTIAL REMISSION (HCC): ICD-10-CM

## 2018-04-27 LAB
25(OH)D3 SERPL-MCNC: 36 NG/ML (ref 30–100)
BASOPHILS # BLD AUTO: 0.4 % (ref 0–1.8)
BASOPHILS # BLD: 0.04 K/UL (ref 0–0.12)
EOSINOPHIL # BLD AUTO: 0.19 K/UL (ref 0–0.51)
EOSINOPHIL NFR BLD: 2 % (ref 0–6.9)
ERYTHROCYTE [DISTWIDTH] IN BLOOD BY AUTOMATED COUNT: 47.6 FL (ref 35.9–50)
HCT VFR BLD AUTO: 41.1 % (ref 37–47)
HGB BLD-MCNC: 13.6 G/DL (ref 12–16)
IMM GRANULOCYTES # BLD AUTO: 0.06 K/UL (ref 0–0.11)
IMM GRANULOCYTES NFR BLD AUTO: 0.6 % (ref 0–0.9)
LYMPHOCYTES # BLD AUTO: 0.96 K/UL (ref 1–4.8)
LYMPHOCYTES NFR BLD: 9.9 % (ref 22–41)
MCH RBC QN AUTO: 30.1 PG (ref 27–33)
MCHC RBC AUTO-ENTMCNC: 33.1 G/DL (ref 33.6–35)
MCV RBC AUTO: 90.9 FL (ref 81.4–97.8)
MONOCYTES # BLD AUTO: 0.56 K/UL (ref 0–0.85)
MONOCYTES NFR BLD AUTO: 5.8 % (ref 0–13.4)
NEUTROPHILS # BLD AUTO: 7.88 K/UL (ref 2–7.15)
NEUTROPHILS NFR BLD: 81.3 % (ref 44–72)
NRBC # BLD AUTO: 0 K/UL
NRBC BLD-RTO: 0 /100 WBC
PLATELET # BLD AUTO: 282 K/UL (ref 164–446)
PMV BLD AUTO: 10.2 FL (ref 9–12.9)
RBC # BLD AUTO: 4.52 M/UL (ref 4.2–5.4)
VIT B12 SERPL-MCNC: 1072 PG/ML (ref 211–911)
WBC # BLD AUTO: 9.7 K/UL (ref 4.8–10.8)

## 2018-04-27 PROCEDURE — 82306 VITAMIN D 25 HYDROXY: CPT

## 2018-04-27 PROCEDURE — 85025 COMPLETE CBC W/AUTO DIFF WBC: CPT

## 2018-04-27 PROCEDURE — 36415 COLL VENOUS BLD VENIPUNCTURE: CPT

## 2018-04-27 PROCEDURE — 82607 VITAMIN B-12: CPT

## 2018-04-27 PROCEDURE — 99214 OFFICE O/P EST MOD 30 MIN: CPT | Performed by: FAMILY MEDICINE

## 2018-04-27 PROCEDURE — 83540 ASSAY OF IRON: CPT

## 2018-04-27 ASSESSMENT — PATIENT HEALTH QUESTIONNAIRE - PHQ9: CLINICAL INTERPRETATION OF PHQ2 SCORE: 0

## 2018-04-27 ASSESSMENT — PAIN SCALES - GENERAL: PAINLEVEL: 1=MINIMAL PAIN

## 2018-04-27 NOTE — PROGRESS NOTES
Subjective:   Bernie Barrientos is a 77 y.o. female here today for fatigue and follow-up sciatica.    Other fatigue  The patient has had several years of fatigue. Patient feels they're getting adequate sleep and feels refreshed in the morning. No history of SAMY symptoms. No blood in the stool or dark tarry stools. She is postmenopausal. No history of seasonal allergies. She does have known depression.    Recurrent major depressive disorder, in partial remission (CMS-[facility])  Mood improved with current medication and therapy. Taking medications as prescribed without side effects. Patient denies SI/HI.    Sciatica of left side  Stable. She does have pain in her lower back that travels down her left leg intermittently, but it has not worsened. Pain is well-controlled with over-the-counter pain relievers. She denies any new injury or trauma.  She would like to continue managing the pain with exercise, stretching and anti-inflammatories.    X-ray in the past showed degenerative disc disease.     Current medicines (including changes today)  Current Outpatient Prescriptions   Medication Sig Dispense Refill   • sertraline (ZOLOFT) 100 MG Tab Take 2 Tabs by mouth every day. 180 Tab 3   • levothyroxine (SYNTHROID) 100 MCG Tab Take 1 Tab by mouth every day. 90 Tab 3   • Collagen 500 MG Cap Take 500 mg by mouth every day.     • Lactobacillus (PROBIOTIC ACIDOPHILUS PO) Take 1 Cap by mouth every day.     • Multiple Vitamin (MULTI VITAMIN DAILY PO) Take 1 Tab by mouth every day.     • Omega-3 Fatty Acids (OMEGA 3 PO) Take 1 Cap by mouth every day.     • vitamin D (CHOLECALCIFEROL) 1000 UNIT Tab Take 1,000 Units by mouth every day.     • BIOTIN 5000 PO Take 1 Tab by mouth every day.     • Magnesium 250 MG Tab Take 1 Tab by mouth every day.     • Potassium 99 MG Tab Take 1 Tab by mouth every day.     • aspirin 81 MG tablet Take 81 mg by mouth every day.     • Glucosamine HCl (GLUCOSAMINE PO) Take 1 Tab by mouth every day.       No current  "facility-administered medications for this visit.      She  has a past medical history of Anesthesia; Arthritis (2003); Colon polyp; Colon polyps; Dental disorder; DJD (degenerative joint disease); Heart valve disease (2005); Hypothyroidism; Major depression (5/2/2013); Migraine; and Postmenopausal hormone replacement therapy. She also has no past medical history of Backpain; Breast cancer (CMS-[facility]); COPD; or Fall.    ROS   No chest pain, no shortness of breath, no abdominal pain.     Objective:     Physical Exam:  Blood pressure 132/72, pulse (!) 57, temperature 36.7 °C (98 °F), resp. rate 16, height 1.753 m (5' 9\"), weight 71.7 kg (158 lb), SpO2 94 %, not currently breastfeeding. Body mass index is 23.33 kg/m².   Constitutional: Alert, no distress.  Skin: Warm, dry, good turgor, no rashes in visible areas.  Eye: Equal, round and reactive, conjunctiva clear, lids normal.  ENMT: Lips without lesions, good dentition, oropharynx clear.  Neck: Trachea midline, no masses, no thyromegaly.  Respiratory: Unlabored respiratory effort, no cough.  MSK: Normal gait. SORIANO.  Psych: Alert and oriented x3, normal affect and mood.    Assessment and Plan:     1. Other fatigue  Ongoing fatigue with unknown etiology. Possibly related to depression. No red flags. Labs as indicated. Will follow-up labs with the patient at next appointment. Emphasized importance of healthy diet, drinking 8 glasses of water a day and exercising. Continue to monitor.  - CBC WITH DIFFERENTIAL; Future  - IRON; Future  - VITAMIN B12; Future  - VITAMIN D,25 HYDROXY; Future    2. Recurrent major depressive disorder, in partial remission (CMS-[facility])  Patient is feeling well on current medications. Will continue. Denies any suicidal or homicidal ideation. Emphasized importance of healthy diet and exercise. Discussed that should the patient have any symptoms they should call suicide prevention hotline or report to the emergency room immediately.    3. Sciatica of " left side  Chronic and stable. Managing well with conservative treatment for now. Will continue to monitor.    Followup: Return in about 6 weeks (around 6/8/2018) for f/u fatigue, short.         PLEASE NOTE: This dictation was created using voice recognition software. I have made every reasonable attempt to correct obvious errors, but I expect that there are errors of grammar and possibly content that I did not discover before finalizing the note.

## 2018-04-27 NOTE — ASSESSMENT & PLAN NOTE
The patient has had several years of fatigue. Patient feels they're getting adequate sleep and feels refreshed in the morning. No history of SAMY symptoms. No blood in the stool or dark tarry stools. She is postmenopausal. No history of seasonal allergies. She does have known depression.

## 2018-04-27 NOTE — ASSESSMENT & PLAN NOTE
Stable. She does have pain in her lower back that travels down her left leg intermittently, but it has not worsened. Pain is well-controlled with over-the-counter pain relievers. She denies any new injury or trauma.  She would like to continue managing the pain with exercise, stretching and anti-inflammatories.    X-ray in the past showed degenerative disc disease.

## 2018-04-28 LAB — IRON SERPL-MCNC: 52 UG/DL (ref 40–170)

## 2018-05-09 NOTE — TELEPHONE ENCOUNTER
Patient notified of results   Consent was obtained from the patient. The risks and benefits to therapy were discussed in detail. Specifically, the risks of infection, scarring, bleeding, prolonged wound healing, incomplete removal, allergy to anesthesia, nerve injury and recurrence were addressed. Prior to the procedure, the treatment site was clearly identified and confirmed by the patient. All components of Universal Protocol/PAUSE Rule completed.

## 2018-06-07 ENCOUNTER — TELEPHONE (OUTPATIENT)
Dept: MEDICAL GROUP | Facility: PHYSICIAN GROUP | Age: 78
End: 2018-06-07

## 2018-06-07 NOTE — TELEPHONE ENCOUNTER
ESTABLISHED PATIENT PRE-VISIT PLANNING     Note: Patient will not be contacted if there is no indication to call.     1.  Reviewed notes from the last few office visits within the medical group: Yes    2.  If any orders were placed at last visit or intended to be done for this visit (i.e. 6 mos follow-up), do we have Results/Consult Notes?        •  Labs - Labs ordered, completed on 04/27/18 and results are in chart.   Note: If patient appointment is for lab review and patient did not complete labs, check with provider if OK to reschedule patient until labs completed.       •  Imaging - Imaging ordered, completed and results are in chart.       •  Referrals - No referrals were ordered at last office visit.    3. Is this appointment scheduled as a Hospital Follow-Up? No    4.  Immunizations were updated in [employer] using Web[employer]?: Yes       •  Web Iz Recommendations: TD and ZOSTAVAX (Shingles)    5.  Patient is due for the following Health Maintenance Topics:   There are no preventive care reminders to display for this patient.    - Patient has completed FLU, PNEUMOVAX (PPSV23), PREVNAR (PCV13)  and TDAP Immunization(s) per WebIZ. Chart has been updated.    6.  MDX printed for Provider? NO    7.  Patient was NOT informed to arrive 15 min prior to their scheduled appointment and bring in their medication bottles.

## 2018-06-08 ENCOUNTER — OFFICE VISIT (OUTPATIENT)
Dept: MEDICAL GROUP | Facility: PHYSICIAN GROUP | Age: 78
End: 2018-06-08
Payer: MEDICARE

## 2018-06-08 VITALS
RESPIRATION RATE: 16 BRPM | BODY MASS INDEX: 23.46 KG/M2 | TEMPERATURE: 97.9 F | WEIGHT: 158.4 LBS | HEIGHT: 69 IN | DIASTOLIC BLOOD PRESSURE: 74 MMHG | OXYGEN SATURATION: 94 % | HEART RATE: 60 BPM | SYSTOLIC BLOOD PRESSURE: 142 MMHG

## 2018-06-08 DIAGNOSIS — E03.9 ACQUIRED HYPOTHYROIDISM: ICD-10-CM

## 2018-06-08 DIAGNOSIS — J30.1 SEASONAL ALLERGIC RHINITIS DUE TO POLLEN: ICD-10-CM

## 2018-06-08 DIAGNOSIS — R53.83 OTHER FATIGUE: ICD-10-CM

## 2018-06-08 DIAGNOSIS — R63.0 LOSS OF APPETITE: ICD-10-CM

## 2018-06-08 DIAGNOSIS — F33.41 RECURRENT MAJOR DEPRESSIVE DISORDER, IN PARTIAL REMISSION (HCC): ICD-10-CM

## 2018-06-08 PROCEDURE — 99214 OFFICE O/P EST MOD 30 MIN: CPT | Performed by: FAMILY MEDICINE

## 2018-06-08 ASSESSMENT — PAIN SCALES - GENERAL: PAINLEVEL: NO PAIN

## 2018-06-08 NOTE — PROGRESS NOTES
"Subjective:   Bernie Barrientos is a 77 y.o. female here today for follow-up fatigue.    Other fatigue  Since her last appointment, patient reports that she has not noticed much improvement in her energy level. She is able to spend several hours outdoors gardening and also walks every day. Denies chest pain, shortness of breath, lightheadedness or dizziness. However, she has had some loss of appetite. Her weight is stable.    She does have a history of depression and mentions that she had a \"down period\" a couple of weeks ago when she was thinking about her late . Continues to take sertraline 100mg daily. No adverse effects. Denies crisis.    Acquired hypothyroidism  Labwork reviewed and up to date. Taking medicine as directed. Denies palpitations, skin changes, temperature intolerance, changes in bowel habits.    Seasonal allergic rhinitis due to pollen  This is a chronic and stable problem for the patient. Taking over-the-counter medications as recommended.     Current medicines (including changes today)  Current Outpatient Prescriptions   Medication Sig Dispense Refill   • sertraline (ZOLOFT) 100 MG Tab Take 2 Tabs by mouth every day. 180 Tab 3   • levothyroxine (SYNTHROID) 100 MCG Tab Take 1 Tab by mouth every day. 90 Tab 3   • Collagen 500 MG Cap Take 500 mg by mouth every day.     • Lactobacillus (PROBIOTIC ACIDOPHILUS PO) Take 1 Cap by mouth every day.     • Multiple Vitamin (MULTI VITAMIN DAILY PO) Take 1 Tab by mouth every day.     • Omega-3 Fatty Acids (OMEGA 3 PO) Take 1 Cap by mouth every day.     • vitamin D (CHOLECALCIFEROL) 1000 UNIT Tab Take 1,000 Units by mouth every day.     • BIOTIN 5000 PO Take 1 Tab by mouth every day.     • Magnesium 250 MG Tab Take 1 Tab by mouth every day.     • Potassium 99 MG Tab Take 1 Tab by mouth every day.     • aspirin 81 MG tablet Take 81 mg by mouth every day.     • Glucosamine HCl (GLUCOSAMINE PO) Take 1 Tab by mouth every day.       No current " "facility-administered medications for this visit.      She  has a past medical history of Anesthesia; Arthritis (2003); Colon polyp; Colon polyps; Dental disorder; DJD (degenerative joint disease); Heart valve disease (2005); Hypothyroidism; Major depression (5/2/2013); Migraine; and Postmenopausal hormone replacement therapy. She also has no past medical history of Backpain; Breast cancer (HCC); COPD; or Fall.    ROS   No chest pain, no shortness of breath, no abdominal pain.     Objective:     Physical Exam:  Blood pressure 142/74, pulse 60, temperature 36.6 °C (97.9 °F), resp. rate 16, height 1.753 m (5' 9\"), weight 71.8 kg (158 lb 6.4 oz), SpO2 94 %, not currently breastfeeding. Body mass index is 23.39 kg/m².   Constitutional: Alert, no distress.  Skin: Warm, dry, good turgor, no rashes in visible areas.  Eye: Equal, round and reactive, conjunctiva clear, lids normal.  ENMT: TM's clear bilaterally, lips without lesions, good dentition, oropharynx clear.  Neck: Trachea midline, no masses, no thyromegaly. No cervical or supraclavicular lymphadenopathy.  Respiratory: Unlabored respiratory effort, lungs clear to auscultation, no wheezes, no ronchi.  Cardiovascular: Normal S1, S2, no murmur, no edema.  Abdomen: Soft, non-tender, no masses, no hepatosplenomegaly.  Psych: Alert and oriented x3, normal affect and mood.    Assessment and Plan:     1. Other fatigue  Stable. I do believe there is a big psychosocial component. Previous work-up unremarkable. Normal stress test in November 2017. Discussed ordering echocardiogram or sleep study and patient declined. She would like to observe for now. Will continue to monitor.    2. Loss of appetite  This is a new problem. No weight loss noted. Will continue to monitor.    3. Recurrent major depressive disorder, in partial remission (HCC)  Patient is feeling well on current medications. Will continue. Denies any suicidal or homicidal ideation. Emphasized importance of healthy " diet and exercise. Discussed that should the patient have any symptoms they should call suicide prevention hotline or report to the emergency room immediately.    4. Acquired hypothyroidism  Continue thyroid medication. Instructed patient to take on empty stomach with glass of water, 30 minutes prior to food or other medications. Labs as indicated.    5. Seasonal allergic rhinitis due to pollen  Advised nasal saline and steroid sprays daily. OTC anti-histamines (Zyrtec) as needed. Encouraged allergen avoidence and environment modification when possible. If regular use not effective, may consider referral to allergist for immunotherapy.    Followup: Return in about 3 months (around 9/8/2018) for f/u loss of appetite, short.         PLEASE NOTE: This dictation was created using voice recognition software. I have made every reasonable attempt to correct obvious errors, but I expect that there are errors of grammar and possibly content that I did not discover before finalizing the note.

## 2018-06-08 NOTE — ASSESSMENT & PLAN NOTE
"Since her last appointment, patient reports that she has not noticed much improvement in her energy level. She is able to spend several hours outdoors gardening and also walks every day. Denies chest pain, shortness of breath, lightheadedness or dizziness. However, she has had some loss of appetite. Her weight is stable.    She does have a history of depression and mentions that she had a \"down period\" a couple of weeks ago when she was thinking about her late . Continues to take sertraline 100mg daily. No adverse effects. Denies crisis.  "
Labwork reviewed and up to date. Taking medicine as directed. Denies palpitations, skin changes, temperature intolerance, changes in bowel habits.    
This is a chronic and stable problem for the patient. Taking over-the-counter medications as recommended.    
abx, insulin, calcium carbonate + vit D, levothyroxine

## 2018-09-10 ENCOUNTER — OFFICE VISIT (OUTPATIENT)
Dept: MEDICAL GROUP | Facility: PHYSICIAN GROUP | Age: 78
End: 2018-09-10
Payer: MEDICARE

## 2018-09-10 VITALS
BODY MASS INDEX: 23.7 KG/M2 | HEART RATE: 82 BPM | DIASTOLIC BLOOD PRESSURE: 70 MMHG | SYSTOLIC BLOOD PRESSURE: 128 MMHG | RESPIRATION RATE: 16 BRPM | HEIGHT: 69 IN | WEIGHT: 160 LBS | OXYGEN SATURATION: 97 % | TEMPERATURE: 99 F

## 2018-09-10 DIAGNOSIS — F33.41 RECURRENT MAJOR DEPRESSIVE DISORDER, IN PARTIAL REMISSION (HCC): ICD-10-CM

## 2018-09-10 DIAGNOSIS — R63.0 LOSS OF APPETITE: ICD-10-CM

## 2018-09-10 DIAGNOSIS — Z23 NEED FOR INFLUENZA VACCINATION: ICD-10-CM

## 2018-09-10 DIAGNOSIS — R53.83 OTHER FATIGUE: ICD-10-CM

## 2018-09-10 DIAGNOSIS — R41.3 MEMORY DIFFICULTIES: ICD-10-CM

## 2018-09-10 PROCEDURE — 99214 OFFICE O/P EST MOD 30 MIN: CPT | Mod: 25 | Performed by: FAMILY MEDICINE

## 2018-09-10 PROCEDURE — 90662 IIV NO PRSV INCREASED AG IM: CPT | Performed by: FAMILY MEDICINE

## 2018-09-10 PROCEDURE — G0008 ADMIN INFLUENZA VIRUS VAC: HCPCS | Performed by: FAMILY MEDICINE

## 2018-09-10 ASSESSMENT — PAIN SCALES - GENERAL: PAINLEVEL: NO PAIN

## 2018-09-10 NOTE — PROGRESS NOTES
Subjective:   Bernie Barrientos is a 78 y.o. female here today for fatigue, loss of appetite and depression.  She is unaccompanied for today's visit.    Since her last appointment, patient tells me that her fatigue and loss of appetite have been relatively stable.  She has increased her sertraline to 200 mg daily as she was having difficulty with her depression.  She tells me that she will either increase or decrease the dose of sertraline depending on her mood.  Most often, it is thoughts of her late  that make her feel sad and lonely.  She denies any suicidal homicidal thoughts.  She has been eating 3 meals a day and has been preparing her own food.  In terms of her sleep, she will lay down in bed around 10:30 at night and will watch the news and fall asleep sometime around 11 to 11:30 at night.  She will wake up on her own around 6 AM and will lay in bed until 8 AM when she will get up to start her day.  She does mention that her cat will wake her up a couple of times at night while she is sleeping.  She denies any snoring.  She has been quite busy in her yard and with some home repairs.  She does admit that she has not been going to the [employer] for exercise classes like she did previously.    She tells me that her stepdaughter who lives with her is worried about her memory.  Patient describes episodes of difficulty remembering names or why she went into her room.  She has not had any issues with paying her bills or driving.    We reviewed lab results from earlier this year which were unremarkable.  She has been on the same dose of levothyroxine for many years and her recent thyroid test was normal.    Current medicines (including changes today)  Current Outpatient Prescriptions   Medication Sig Dispense Refill   • sertraline (ZOLOFT) 100 MG Tab Take 2 Tabs by mouth every day. 180 Tab 3   • levothyroxine (SYNTHROID) 100 MCG Tab Take 1 Tab by mouth every day. 90 Tab 3   • Collagen 500 MG Cap Take 500 mg by  "mouth every day.     • Lactobacillus (PROBIOTIC ACIDOPHILUS PO) Take 1 Cap by mouth every day.     • Multiple Vitamin (MULTI VITAMIN DAILY PO) Take 1 Tab by mouth every day.     • Omega-3 Fatty Acids (OMEGA 3 PO) Take 1 Cap by mouth every day.     • vitamin D (CHOLECALCIFEROL) 1000 UNIT Tab Take 1,000 Units by mouth every day.     • BIOTIN 5000 PO Take 1 Tab by mouth every day.     • Magnesium 250 MG Tab Take 1 Tab by mouth every day.     • Potassium 99 MG Tab Take 1 Tab by mouth every day.     • aspirin 81 MG tablet Take 81 mg by mouth every day.     • Glucosamine HCl (GLUCOSAMINE PO) Take 1 Tab by mouth every day.       No current facility-administered medications for this visit.      She  has a past medical history of Anesthesia; Arthritis (2003); Colon polyp; Colon polyps; Dental disorder; DJD (degenerative joint disease); Heart valve disease (2005); Hypothyroidism; Major depression (5/2/2013); Migraine; and Postmenopausal hormone replacement therapy. She also has no past medical history of Backpain; Breast cancer (HCC); COPD; or Fall.    ROS   No chest pain, no shortness of breath, no abdominal pain.     Objective:     Physical Exam:  Blood pressure 128/70, pulse 82, temperature 37.2 °C (99 °F), resp. rate 16, height 1.753 m (5' 9\"), weight 72.6 kg (160 lb), SpO2 97 %, not currently breastfeeding. Body mass index is 23.63 kg/m².   Constitutional: Alert, no distress, non-toxic appearance.  Skin: Warm, dry, good turgor, no rashes in visible areas.  Eye: Equal, round and reactive, conjunctiva clear, lids normal.  ENMT: Lips without lesions, good dentition, moist mucous membranes.  Neck: Trachea midline, no masses, no thyromegaly.   Respiratory: Unlabored respiratory effort, no cough.  MSK: Normal gait, SORIANO.  Neuro: Grossly non-focal. No cranial nerve deficit. Strength and sensation intact.   Psych: Alert and oriented x3, normal affect and mood.    Assessment and Plan:     1. Other fatigue  2. Loss of appetite  3. " Memory difficulties  4. Recurrent major depressive disorder, in partial remission (HCC)  Stable.  There does appear to be a strong psychosocial component and patient has been gradually increasing her antidepressant.  We did discuss further medication augmentation with initiation of the appropriate on, but she declines at this time.  She is not interested in referrals to psychology.  I reassured her that our workup has been unremarkable thus far.  I did advise her that many of her symptoms can be caused by depression.  No indication for further memory testing or neuroimaging at this time.  Discussed return precautions.  Will continue to monitor closely.    5. Need for influenza vaccination  Influenza vaccine discussed and administered in office today.  - INFLUENZA VACCINE, HIGH DOSE (65+ ONLY)    Followup: Return in about 6 months (around 3/10/2019) for routine follow-up, short.         PLEASE NOTE: This dictation was created using voice recognition software. I have made every reasonable attempt to correct obvious errors, but I expect that there are errors of grammar and possibly content that I did not discover before finalizing the note.

## 2018-12-17 NOTE — TELEPHONE ENCOUNTER
Pt is here today to ask for a refill of Levothyroxine 100 mcg. Please call pt with any questions, ok to leave a detailed msg. Please call into [facility] [address]

## 2018-12-18 RX ORDER — LEVOTHYROXINE SODIUM 0.1 MG/1
100 TABLET ORAL DAILY
Qty: 90 TAB | Refills: 3 | Status: SHIPPED | OUTPATIENT
Start: 2018-12-18 | End: 2019-12-19 | Stop reason: SDUPTHER

## 2019-02-11 ENCOUNTER — OFFICE VISIT (OUTPATIENT)
Dept: MEDICAL GROUP | Facility: PHYSICIAN GROUP | Age: 79
End: 2019-02-11
Payer: MEDICARE

## 2019-02-11 VITALS
DIASTOLIC BLOOD PRESSURE: 64 MMHG | OXYGEN SATURATION: 94 % | SYSTOLIC BLOOD PRESSURE: 132 MMHG | WEIGHT: 162 LBS | BODY MASS INDEX: 23.99 KG/M2 | HEART RATE: 66 BPM | HEIGHT: 69 IN | RESPIRATION RATE: 16 BRPM | TEMPERATURE: 96.9 F

## 2019-02-11 DIAGNOSIS — E03.9 ACQUIRED HYPOTHYROIDISM: ICD-10-CM

## 2019-02-11 DIAGNOSIS — N18.30 STAGE 3 CHRONIC KIDNEY DISEASE (HCC): ICD-10-CM

## 2019-02-11 DIAGNOSIS — M25.561 CHRONIC PAIN OF RIGHT KNEE: ICD-10-CM

## 2019-02-11 DIAGNOSIS — M17.11 OSTEOARTHRITIS OF RIGHT KNEE, UNSPECIFIED OSTEOARTHRITIS TYPE: ICD-10-CM

## 2019-02-11 DIAGNOSIS — G89.29 CHRONIC PAIN OF RIGHT KNEE: ICD-10-CM

## 2019-02-11 DIAGNOSIS — F33.1 MODERATE EPISODE OF RECURRENT MAJOR DEPRESSIVE DISORDER (HCC): ICD-10-CM

## 2019-02-11 PROCEDURE — 99213 OFFICE O/P EST LOW 20 MIN: CPT | Mod: 25 | Performed by: FAMILY MEDICINE

## 2019-02-11 PROCEDURE — 8041 PR SCP AHA: Performed by: FAMILY MEDICINE

## 2019-02-11 PROCEDURE — 20610 DRAIN/INJ JOINT/BURSA W/O US: CPT | Performed by: FAMILY MEDICINE

## 2019-02-11 RX ORDER — TRIAMCINOLONE ACETONIDE 40 MG/ML
40 INJECTION, SUSPENSION INTRA-ARTICULAR; INTRAMUSCULAR ONCE
Status: COMPLETED | OUTPATIENT
Start: 2019-02-11 | End: 2019-02-11

## 2019-02-11 RX ADMIN — TRIAMCINOLONE ACETONIDE 40 MG: 40 INJECTION, SUSPENSION INTRA-ARTICULAR; INTRAMUSCULAR at 13:20

## 2019-02-11 NOTE — PROGRESS NOTES
Subjective:     Bernie Barrientos is a 78 y.o. female here today for depression, chronic kidney disease, right knee pain and Annual Health Assessment.  She is unaccompanied for today's visit.    Patient reports that her depression has slightly worsened.  No specific event or trigger.  She denies suicidal or homicidal thoughts.  Having trouble sleeping.  She wonders about changing her medication.  She is taking sertraline 200mg daily.  No adverse effects.    In regards to her chronic kidney disease, this is stable.  She is avoiding NSAIDs.  Denies blood in urine or flank pain.  Due for labs.    Her main concern today is right knee pain.  She has known osteoarthritis.  This was seen on x-rays in January 2017.  Denies recent injury or fall.  She is requesting a cortisone injection today.  Her last one was in September 2017.  She does take glucosamine.  No swelling or erythema.    Health Maintenance Summary                IMM ZOSTER VACCINES Postponed 3/31/2019 Originally 9/7/1989. System: vaccine not available, other system reasons    Annual Wellness Visit Next Due 4/17/2019      Done 4/16/2018 Visit Dx: Medicare annual wellness visit, subsequent     Patient has more history with this topic...    BONE DENSITY Next Due 2/21/2022      Done 2/21/2017 DS-BONE DENSITY STUDY (DEXA)     Patient has more history with this topic...    COLONOSCOPY Next Due 9/11/2024      Not specified 9/11/2014      Patient has more history with this topic...    IMM DTaP/Tdap/Td Vaccine Next Due 12/4/2024      Done 12/4/2014 Imm Admin: Tdap Vaccine        Annual Health Assessment Questions:     1.  Are you currently engaging in any exercise or physical activity? Yes, walks     2.  How would you describe your mood or emotional well-being today? good    3.  Have you had any falls in the last year? No    4.  Have you noticed any problems with your balance or had difficulty walking? Yes    5.  In the last six months have you experienced any leakage of  urine? Yes    6. DPA/Advanced Directive: Patient has Advanced Directive, but it is not on file. Instructed to bring in a copy to scan into their chart.    We also discussed her concerns regarding balance and urinary incontinence.  She tells me these issues are mild and she does not think anything needs to be done at this time.    Current medicines (including changes today)  Current Outpatient Prescriptions   Medication Sig Dispense Refill   • levothyroxine (SYNTHROID) 100 MCG Tab Take 1 Tab by mouth every day. 90 Tab 3   • sertraline (ZOLOFT) 100 MG Tab Take 2 Tabs by mouth every day. 180 Tab 3   • Collagen 500 MG Cap Take 500 mg by mouth every day.     • Lactobacillus (PROBIOTIC ACIDOPHILUS PO) Take 1 Cap by mouth every day.     • Multiple Vitamin (MULTI VITAMIN DAILY PO) Take 1 Tab by mouth every day.     • Omega-3 Fatty Acids (OMEGA 3 PO) Take 1 Cap by mouth every day.     • vitamin D (CHOLECALCIFEROL) 1000 UNIT Tab Take 1,000 Units by mouth every day.     • BIOTIN 5000 PO Take 1 Tab by mouth every day.     • Magnesium 250 MG Tab Take 1 Tab by mouth every day.     • Potassium 99 MG Tab Take 1 Tab by mouth every day.     • aspirin 81 MG tablet Take 81 mg by mouth every day.     • Glucosamine HCl (GLUCOSAMINE PO) Take 1 Tab by mouth every day.       No current facility-administered medications for this visit.        She  has a past medical history of Anesthesia; Arthritis (2003); Colon polyp; Colon polyps; Dental disorder; DJD (degenerative joint disease); Heart valve disease (2005); Hypothyroidism; Major depression (5/2/2013); Migraine; and Postmenopausal hormone replacement therapy. She also has no past medical history of Backpain; Breast cancer (HCC); COPD; or Fall.    Naprosyn [naproxen] and Pcn [penicillins]    She  reports that she quit smoking about 39 years ago. Her smoking use included Cigarettes. She has a 8.00 pack-year smoking history. She has never used smokeless tobacco. She reports that she drinks  "about 0.5 oz of alcohol per week . She reports that she does not use drugs.  Counseling given: Not Answered    ROS   No chest pain, no shortness of breath, no abdominal pain.     Objective:     Physical Exam:  Blood pressure 132/64, pulse 66, temperature 36.1 °C (96.9 °F), resp. rate 16, height 1.753 m (5' 9\"), weight 73.5 kg (162 lb), SpO2 94 %, not currently breastfeeding. Body mass index is 23.92 kg/m².   Constitutional: Alert, no distress.  Skin: Warm, dry, good turgor, no rashes in visible areas.  Eye: Equal, round and reactive, conjunctiva clear, lids normal.  ENMT: Lips without lesions, good dentition, oropharynx clear.  Neck: Trachea midline, no masses, no thyromegaly. No cervical or supraclavicular lymphadenopathy.  Respiratory: Unlabored respiratory effort, lungs clear to auscultation, no wheezes, no rhonchi.  Cardiovascular: Normal S1, S2, no murmur, no edema.  Abdomen: Soft, non-tender, no masses, no hepatosplenomegaly.  MSK: Slightly antalgic gait, moves all extremities.  Knees: No erythema/edema/ecchymosis/effusion. Tenderness to palpation along right patella, medial and lateral joint lines. Patellar grind test positive. Lachman's negative. Maite's negative. ROM intact. 5/5 strength bilaterally. 2+ deep tendon reflexes bilaterally.  Psych: Alert and oriented x3, normal affect and mood.    Assessment and Plan:     1. Moderate episode of recurrent major depressive disorder (HCC)  Worsening per patient report.  She denies crisis.  We had a lengthy discussion regarding treatment options.  She declines trial of additional bupropion or weaning down sertraline and initiating a new anti-depressant.  Offered referral to therapy which she declined.  We will follow-up later this year to see if a change in medication regimen is needed.  Strict return precautions given.    2. Stage 3 chronic kidney disease (HCC)  This is a chronic and stable problem.  Most recent labs reviewed with the patient.  Discussed " avoidance of nephrotoxic drugs.  Continue hypertensive management.  Continue to monitor.  - Basic Metabolic Panel; Future    3. Osteoarthritis of right knee, unspecified osteoarthritis type  4. Chronic pain of right knee  Worsening.  She has been taking over-the-counter pain relievers and limiting activity without success.  She has known osteoarthritis and has responded well to cortisone injections in the past.  Injection administered today.  See procedure note below.    PROCEDURE NOTE:  Discussed the benefits, alternatives and risks of the procedure.  Patient agrees to cortisone injection of right knee.  The joint was prepped and draped in usual sterile fashion.  A 25-guage needle was inserted into the anterior medial aspect of the joint.  I injected 2 cc of 1% lidocaine without epi and 1 cc of kenalog 40mg/mL into the joint.  The area was cleaned with alcohol swab and band-aid was applied.  Patient tolerated procedure well with no complications.  After care instructions discussed.  - triamcinolone acetonide (KENALOG-40) injection 40 mg; 1 mL by Intra-articular route Once.  - Consent for Amb Surgery/Procedure    5. Acquired hypothyroidism  Continue thyroid medication.  Instructed patient to take on empty stomach with glass of water, 30 minutes prior to food or other medications.  Labs as indicated.  - TSH; Future    Discussion today about general wellness and lifestyle habits:    · Engage in regular physical activity and social activities.  · Prevent falls and reduce trip hazards; using ambulatory aides, hearing and vision testing if appropriate.  · Steps to improve urinary incontinence.  · Advanced care planning.    Follow-Up: Return in about 6 months (around 8/11/2019) for f/u depression, short.         PLEASE NOTE: This dictation was created using voice recognition software. I have made every reasonable attempt to correct obvious errors, but I expect that there are errors of grammar and possibly content that I  did not discover before finalizing the note.

## 2019-04-04 ENCOUNTER — OFFICE VISIT (OUTPATIENT)
Dept: MEDICAL GROUP | Facility: PHYSICIAN GROUP | Age: 79
End: 2019-04-04
Payer: MEDICARE

## 2019-04-04 VITALS
HEIGHT: 69 IN | RESPIRATION RATE: 16 BRPM | WEIGHT: 158 LBS | TEMPERATURE: 98.4 F | SYSTOLIC BLOOD PRESSURE: 130 MMHG | BODY MASS INDEX: 23.4 KG/M2 | OXYGEN SATURATION: 97 % | DIASTOLIC BLOOD PRESSURE: 62 MMHG | HEART RATE: 63 BPM

## 2019-04-04 DIAGNOSIS — J00 COMMON COLD: ICD-10-CM

## 2019-04-04 PROCEDURE — 99214 OFFICE O/P EST MOD 30 MIN: CPT | Performed by: PHYSICIAN ASSISTANT

## 2019-04-04 RX ORDER — BENZONATATE 100 MG/1
100 CAPSULE ORAL 3 TIMES DAILY PRN
Qty: 60 CAP | Refills: 0 | Status: SHIPPED | OUTPATIENT
Start: 2019-04-04 | End: 2019-05-11

## 2019-04-04 ASSESSMENT — PATIENT HEALTH QUESTIONNAIRE - PHQ9
2. FEELING DOWN, DEPRESSED, IRRITABLE, OR HOPELESS: NOT AT ALL
5. POOR APPETITE OR OVEREATING: NOT AT ALL
8. MOVING OR SPEAKING SO SLOWLY THAT OTHER PEOPLE COULD HAVE NOTICED. OR THE OPPOSITE, BEING SO FIGETY OR RESTLESS THAT YOU HAVE BEEN MOVING AROUND A LOT MORE THAN USUAL: NOT AT ALL
6. FEELING BAD ABOUT YOURSELF - OR THAT YOU ARE A FAILURE OR HAVE LET YOURSELF OR YOUR FAMILY DOWN: NOT AL ALL
4. FEELING TIRED OR HAVING LITTLE ENERGY: NOT AT ALL
7. TROUBLE CONCENTRATING ON THINGS, SUCH AS READING THE NEWSPAPER OR WATCHING TELEVISION: NOT AT ALL
9. THOUGHTS THAT YOU WOULD BE BETTER OFF DEAD, OR OF HURTING YOURSELF: NOT AT ALL
1. LITTLE INTEREST OR PLEASURE IN DOING THINGS: NOT AT ALL
SUM OF ALL RESPONSES TO PHQ QUESTIONS 1-9: 0
SUM OF ALL RESPONSES TO PHQ9 QUESTIONS 1 AND 2: 0
3. TROUBLE FALLING OR STAYING ASLEEP OR SLEEPING TOO MUCH: NOT AT ALL

## 2019-04-04 NOTE — PROGRESS NOTES
Chief Complaint   Patient presents with   • Pharyngitis     x 2 wks       HISTORY OF PRESENT ILLNESS: Bernie Barrientos is an established 78 y.o. female here to discuss the evaluation and management of:    Patient is a pleasant 78-year-old female here today to discuss upper respiratory infection symptoms for the past 2 weeks.  She tells me 2 weeks ago she developed a sore throat and then soon after developed nasal congestion.  States 3 days ago she developed a cough that is predominantly nonproductive.  States intermittently she will cough up a small amount of yellow mucus.  States nasal congestion symptoms have improved but she tells me today she developed weakness/fatigue, and chills.  She denies having a fever, sinus pressure, earache, headache, body aches, shortness of breath, wheezing, nausea, vomiting, abdominal pain, skin rash.  States she has been resting, using humidifier, and saline rinse as needed.  She tells me that she seemed her flu vaccination this year.  Denies sleep deprivation symptoms due to cough.      Patient Active Problem List    Diagnosis Date Noted   • DJD (degenerative joint disease)      Priority: Low   • Other fatigue 04/27/2018   • Risk for falls 04/16/2018   • Sciatica of left side 09/14/2017   • Heart murmur 09/14/2017   • Seasonal allergic rhinitis due to pollen 06/02/2017   • Pure hypercholesterolemia 06/02/2017   • Recurrent major depressive disorder, in partial remission (HCC) 09/15/2016   • Acquired hypothyroidism 11/30/2015   • Stage 3 chronic kidney disease (HCC) 11/30/2015       Allergies:Naprosyn [naproxen] and Pcn [penicillins]    Current Outpatient Prescriptions   Medication Sig Dispense Refill   • benzonatate (TESSALON) 100 MG Cap Take 1 Cap by mouth 3 times a day as needed for Cough. 60 Cap 0   • levothyroxine (SYNTHROID) 100 MCG Tab Take 1 Tab by mouth every day. 90 Tab 3   • sertraline (ZOLOFT) 100 MG Tab Take 2 Tabs by mouth every day. 180 Tab 3   • Collagen 500 MG Cap  Take 500 mg by mouth every day.     • Lactobacillus (PROBIOTIC ACIDOPHILUS PO) Take 1 Cap by mouth every day.     • Multiple Vitamin (MULTI VITAMIN DAILY PO) Take 1 Tab by mouth every day.     • Omega-3 Fatty Acids (OMEGA 3 PO) Take 1 Cap by mouth every day.     • vitamin D (CHOLECALCIFEROL) 1000 UNIT Tab Take 1,000 Units by mouth every day.     • BIOTIN 5000 PO Take 1 Tab by mouth every day.     • Magnesium 250 MG Tab Take 1 Tab by mouth every day.     • Potassium 99 MG Tab Take 1 Tab by mouth every day.     • aspirin 81 MG tablet Take 81 mg by mouth every day.     • Glucosamine HCl (GLUCOSAMINE PO) Take 1 Tab by mouth every day.       No current facility-administered medications for this visit.        Social History   Substance Use Topics   • Smoking status: Former Smoker     Packs/day: 1.00     Years: 8.00     Types: Cigarettes     Quit date: 1980   • Smokeless tobacco: Never Used      Comment: quit    • Alcohol use 0.5 oz/week     1 Glasses of wine per week      Comment: two per week       Family Status   Relation Status   • Mo    • Sis Alive   • Fa    • Unknown (Not Specified)   • Neg Hx (Not Specified)     Family History   Problem Relation Age of Onset   • Cancer Mother         colon   • Diabetes Mother    • Thyroid Sister    • No Known Problems Father         Passed away before the pt was born    • Diabetes Unknown    • Heart Disease Neg Hx    • Hypertension Neg Hx    • Hyperlipidemia Neg Hx        ROS:  Review of Systems   Constitutional: Negative for fever and weight loss. + for chills and malaise/fatigue.   HENT: Negative for ear pain, nosebleeds, and neck pain.  Positive for sore throat, nasal congestion.   Eyes: Negative for blurred vision.   Respiratory: Negative for shortness of breath and wheezing.  Positive for a cough that is intermittently productive.  Cardiovascular: Negative for chest pain, palpitations, orthopnea and leg swelling.   Gastrointestinal: Negative for  "heartburn, nausea, vomiting and abdominal pain.   Genitourinary: Negative for dysuria, urgency and frequency.   Musculoskeletal: Negative for myalgias, back pain and joint pain.   Skin: Negative for rash and itching.   Neurological: Negative for dizziness, tingling, tremors, sensory change, focal weakness and headaches.   Endo/Heme/Allergies: Does not bruise/bleed easily.   Psychiatric/Behavioral: Negative for depression, suicidal ideas and memory loss.  The patient is not nervous/anxious and does not have insomnia.    All other systems reviewed and are negative except as in HPI.    Exam: /62   Pulse 63   Temp 36.9 °C (98.4 °F)   Resp 16   Ht 1.753 m (5' 9\")   Wt 71.7 kg (158 lb)   SpO2 97%  Body mass index is 23.33 kg/m².  General: Normal appearing. No distress.  HEENT: Normocephalic. Eyes conjunctiva clear lids without ptosis, pupils equal and reactive to light accommodation, ears normal shape and contour, canals are clear bilaterally, tympanic membranes are benign, nasal mucosa erythematous and boggy, oropharynx is without erythema, edema or exudates.  Positive for postnasal drip.  Neck: Supple without JVD or bruit. Thyroid is not enlarged.  Pulmonary: Clear to ausculation.  Normal effort. No rales, ronchi, or wheezing.  Cardiovascular: Regular rate and rhythm without murmur.   Abdomen: Soft, nontender, nondistended.  Neurologic: Grossly nonfocal.  Cranial nerves are normal.   Lymph: No cervical, supraclavicular or axillary lymph nodes are palpable  Skin: Warm and dry.  No rashes or suspicious skin lesions.  Musculoskeletal: Normal gait. No extremity cyanosis, clubbing, or edema.  Psych: Normal mood and affect. Alert and oriented x3. Judgment and insight is normal.    Medical decision-making and discussion:  1. Common cold  Patient has been prescribed benzonatate 100 mg tab advised take by mouth 3 times a day as needed for cough.  Educated patient that on natural course of benign viral URI's.  I do " not feel that an antibiotic prescription is warranted at this time.  Advised patient if symptoms worsen to contact me.    Advised patient to the following:    • Twice daily use of nasal saline rinse or Neti-Pot encouraged.  After rinsing nasal cavities use Flonase.  Can use Flonase up to twice a day.  • Use over-the-counter decongestant such as Sudafed or Mucinex DM.  Make sure you monitor blood pressure when using these medications.  They can elevate your blood pressure.  • Use over-the-counter Claritin, Zyrtec, Allegra.  • Is over-the-counter Tylenol and/or ibuprofen as needed.  • Drink plenty of fluids to keep yourself hydrated.  Suggested tea with honey and soup.  • Increase humidity in the house with a humidifier.    • Follow-up for worsening symptoms, difficulty breathing, lack of expected recovery, or should new symptoms or problems arise.      - benzonatate (TESSALON) 100 MG Cap; Take 1 Cap by mouth 3 times a day as needed for Cough.  Dispense: 60 Cap; Refill: 0      Please note that this dictation was created using voice recognition software. I have made every reasonable attempt to correct obvious errors, but I expect that there are errors of grammar and possibly content that I did not discover before finalizing the note.      Return if symptoms worsen or fail to improve.

## 2019-04-15 DIAGNOSIS — F33.0 MAJOR DEPRESSIVE DISORDER, RECURRENT EPISODE, MILD (HCC): ICD-10-CM

## 2019-04-15 RX ORDER — SERTRALINE HYDROCHLORIDE 100 MG/1
TABLET, FILM COATED ORAL
Qty: 180 TAB | Refills: 3 | Status: SHIPPED | OUTPATIENT
Start: 2019-04-15 | End: 2019-07-22 | Stop reason: SDUPTHER

## 2019-04-29 ENCOUNTER — HOSPITAL ENCOUNTER (OUTPATIENT)
Dept: LAB | Facility: MEDICAL CENTER | Age: 79
End: 2019-04-29
Attending: FAMILY MEDICINE
Payer: MEDICARE

## 2019-04-29 DIAGNOSIS — N18.30 STAGE 3 CHRONIC KIDNEY DISEASE (HCC): ICD-10-CM

## 2019-04-29 DIAGNOSIS — E03.9 ACQUIRED HYPOTHYROIDISM: ICD-10-CM

## 2019-04-29 LAB
ANION GAP SERPL CALC-SCNC: 10 MMOL/L (ref 0–11.9)
BUN SERPL-MCNC: 20 MG/DL (ref 8–22)
CALCIUM SERPL-MCNC: 9.6 MG/DL (ref 8.5–10.5)
CHLORIDE SERPL-SCNC: 103 MMOL/L (ref 96–112)
CO2 SERPL-SCNC: 25 MMOL/L (ref 20–33)
CREAT SERPL-MCNC: 1.08 MG/DL (ref 0.5–1.4)
FASTING STATUS PATIENT QL REPORTED: NORMAL
GLUCOSE SERPL-MCNC: 105 MG/DL (ref 65–99)
POTASSIUM SERPL-SCNC: 4.3 MMOL/L (ref 3.6–5.5)
SODIUM SERPL-SCNC: 138 MMOL/L (ref 135–145)

## 2019-04-29 PROCEDURE — 36415 COLL VENOUS BLD VENIPUNCTURE: CPT

## 2019-04-29 PROCEDURE — 80048 BASIC METABOLIC PNL TOTAL CA: CPT

## 2019-04-29 PROCEDURE — 84443 ASSAY THYROID STIM HORMONE: CPT

## 2019-04-30 LAB — TSH SERPL DL<=0.005 MIU/L-ACNC: 2.4 UIU/ML (ref 0.38–5.33)

## 2019-05-11 ENCOUNTER — HOSPITAL ENCOUNTER (EMERGENCY)
Facility: MEDICAL CENTER | Age: 79
End: 2019-05-11
Attending: EMERGENCY MEDICINE
Payer: MEDICARE

## 2019-05-11 ENCOUNTER — APPOINTMENT (OUTPATIENT)
Dept: RADIOLOGY | Facility: MEDICAL CENTER | Age: 79
End: 2019-05-11
Attending: EMERGENCY MEDICINE
Payer: MEDICARE

## 2019-05-11 VITALS
WEIGHT: 154.32 LBS | SYSTOLIC BLOOD PRESSURE: 152 MMHG | TEMPERATURE: 97.5 F | HEIGHT: 69 IN | RESPIRATION RATE: 16 BRPM | HEART RATE: 57 BPM | BODY MASS INDEX: 22.86 KG/M2 | OXYGEN SATURATION: 96 % | DIASTOLIC BLOOD PRESSURE: 61 MMHG

## 2019-05-11 DIAGNOSIS — S69.92XA INJURY OF LEFT THUMB, INITIAL ENCOUNTER: ICD-10-CM

## 2019-05-11 PROCEDURE — 99285 EMERGENCY DEPT VISIT HI MDM: CPT

## 2019-05-11 PROCEDURE — 36415 COLL VENOUS BLD VENIPUNCTURE: CPT

## 2019-05-11 PROCEDURE — 73130 X-RAY EXAM OF HAND: CPT | Mod: LT

## 2019-05-11 ASSESSMENT — ENCOUNTER SYMPTOMS
FEVER: 0
LOSS OF CONSCIOUSNESS: 0
VOMITING: 0

## 2019-05-11 NOTE — ED PROVIDER NOTES
"ED Provider Note    Scribed for Los Hearn M.D. by Giulia Sheffield. 5/11/2019, 1:51 PM.    Primary care provider: Citlalli Lindsey M.D.  Means of arrival: Ambulance   History obtained from: Patient  History limited by: None     CHIEF COMPLAINT  Chief Complaint   Patient presents with   • GLF   • Finger Pain     LEFT thumb     HPI  Bernie Barrientos is a 78 y.o. female who presents to the Emergency Department via EMS for a ground level fall just prior to arrival. The patient states that she tripped over a rock. She injured her left thumb in the process of trying to catch her fall. At this time, she states that she landed on her left hip and slightly \"tapped\" her head. She denies loss of consciousness, vomiting, fever, shoulder pain. The patient was given Advil and Tylenol with minimal alleviation of her symptoms. The patient recently had surgery on her left hand by a orthopaedist in [place], [place]. The patient is not currently on blood thinners.     REVIEW OF SYSTEMS  Review of Systems   Constitutional: Negative for fever.   Gastrointestinal: Negative for vomiting.   Musculoskeletal:        Positive for left thumb pain, left hip pain. Negative for shoulder pain.   Neurological: Negative for loss of consciousness.       PAST MEDICAL HISTORY   has a past medical history of Anesthesia; Arthritis (2003); Colon polyp; Colon polyps; Dental disorder; DJD (degenerative joint disease); Heart valve disease (2005); Hypothyroidism; Major depression (5/2/2013); Migraine; and Postmenopausal hormone replacement therapy.    SURGICAL HISTORY   has a past surgical history that includes hip replacement, total (2001); tonsillectomy (as child); abdominal hysterectomy total (1984); appendectomy (1957); other neurological surg (2004/2008); hand surgery (2004); hand surgery (2009); colonoscopy with polyp (8/15/2013); athroplasty (Bilateral); and laminotomy.    SOCIAL HISTORY  Social History   Substance Use Topics   • Smoking status: " "Former Smoker     Packs/day: 1.00     Years: 8.00     Types: Cigarettes     Quit date: 1/1/1980   • Smokeless tobacco: Never Used      Comment: quit 1975   • Alcohol use 0.5 oz/week     1 Glasses of wine per week      Comment: two per week      History   Drug Use No       FAMILY HISTORY  Family History   Problem Relation Age of Onset   • Cancer Mother         colon   • Diabetes Mother    • Thyroid Sister    • No Known Problems Father         Passed away before the pt was born    • Diabetes Unknown    • Heart Disease Neg Hx    • Hypertension Neg Hx    • Hyperlipidemia Neg Hx        CURRENT MEDICATIONS  Home Medications     Reviewed by Heather Mg R.N. (Registered Nurse) on 05/11/19 at 1319  Med List Status: Partial   Medication Last Dose Status   aspirin 81 MG tablet 5/10/2019 Active   BIOTIN 5000 PO 5/10/2019 Active   Collagen 500 MG Cap 5/10/2019 Active   Glucosamine HCl (GLUCOSAMINE PO) 5/10/2019 Active   Lactobacillus (PROBIOTIC ACIDOPHILUS PO) 5/10/2019 Active   levothyroxine (SYNTHROID) 100 MCG Tab 5/10/2019 Active   Magnesium 250 MG Tab 5/10/2019 Active   Multiple Vitamin (MULTI VITAMIN DAILY PO) 5/10/2019 Active   Omega-3 Fatty Acids (OMEGA 3 PO) 5/10/2019 Active   Potassium 99 MG Tab 5/10/2019 Active   sertraline (ZOLOFT) 100 MG Tab 5/10/2019 Active   vitamin D (CHOLECALCIFEROL) 1000 UNIT Tab 5/10/2019 Active                ALLERGIES  Allergies   Allergen Reactions   • Naprosyn [Naproxen] Rash     Rash on body   • Pcn [Penicillins]      Reaction when she was a child        PHYSICAL EXAM  VITAL SIGNS: /61   Pulse (!) 56   Temp 36.4 °C (97.5 °F) (Temporal)   Resp 16   Ht 1.753 m (5' 9\")   Wt 70 kg (154 lb 5.2 oz)   SpO2 92%   BMI 22.79 kg/m²     Constitutional: Well developed, Well nourished, No acute distress, Non-toxic appearance.   HENT: Normocephalic, nontender, oropharynx moist, No oral exudates, Nose normal.   Neck: Supple, no meningeal signs.  Lymphatic: No lymphadenopathy noted. "   Cardiovascular: Regular rate and rhythm without murmurs gallops or rubs.   Thorax & Lungs: No respiratory distress. Breathing comfortably. Lungs are clear to auscultation bilaterally, there are no wheezes no rales. Chest wall is nontender.  Abdomen: Soft, nontender, nondistended. Bowel sounds are present.   Skin: Warm, Dry, No erythema,   Back: No tenderness, No CVA tenderness.  Musculoskeletal: Good range of motion in all major joints. No major deformities noted. Intact distal pulses, no clubbing, no cyanosis. Tenderness along the thenar aspect of the left hand. Shoulder non tender.    Neurologic: Normal sensation distally.   Psychiatric: Affect normal, Judgment normal, Mood normal.       RADIOLOGY  DX-HAND 3+ LEFT   Final Result      1.  Severe multifocal osteoarthritis.      2.  Prior resection of the trapezium.              The radiologist's interpretation of all radiological studies have been reviewed by me.    COURSE & MEDICAL DECISION MAKING  Pertinent Labs & Imaging studies reviewed. (See chart for details)    1:51 PM - Patient seen and examined at bedside. Ordered DX hand left, ice pack to evaluate her symptoms.     2:42 PM - Patient was reevaluated at bedside. Discussed radiology results with the patient and informed them that there is no evidence of fracture. I advised her to follow up with our on call orthopaedist. I informed her that ice and Tylenol should be utilized for pain management. I informed her to return to the ED for any new or worsening symptoms. I discussed that the patient will be discharged home in a splint. The patient understands and agrees to the treatment plan.     Decision Making:  Patient presents for evaluation for clinically seem to be more left thumb injury.  There is no other signs of muscular skeletal injury.  I will place the patient into a thumb spica splint.  Patient is to follow-up with orthopedics for further outpatient treatment care return as needed.  At this point the  patient does not wish to have any narcotic pain medications will use ice Tylenol and ibuprofen.    The patient will return for new or worsening symptoms and is stable at the time of discharge.    It was noticed that the patient's blood pressure was greater than 120/80 on today's visit. At this point, most likely related to reactive hypertension, secondary to the emergency visit itself. I have recommend the patient follow up with her primary care physician for recheck of her blood pressure.       DISPOSITION:  Patient will be discharged home in stable condition.    FOLLOW UP:  Burke Doimnguez M.D.  [address]  [address]  [facility] [address]  [phone]    Schedule an appointment as soon as possible for a visit in 1 week  For re-check, Return if any symptoms worsen      OUTPATIENT MEDICATIONS:  Discharge Medication List as of 5/11/2019  3:11 PM          FINAL IMPRESSION  1. Injury of left thumb, initial encounter          Giulia LING (Scribe), am scribing for, and in the presence of, Los Hearn M.D..    Electronically signed by: Giulia Sheffield (Scribe), 5/11/2019    ILos M.D. personally performed the services described in this documentation, as scribed by Giulia Sheffield in my presence, and it is both accurate and complete. E    The note accurately reflects work and decisions made by me.  Los Hearn  5/11/2019  5:38 PM

## 2019-05-11 NOTE — ED TRIAGE NOTES
Bernie Barrientos  Pt bib EMS. Pt changed into gown and placed on monitor.     Chief Complaint   Patient presents with   • GLF   • Finger Pain     LEFT thumb     Per EMS, pt was gardening in her yard today and tripped over a rock. Pt reports hitting self on brick wall before falling to ground. Pt states she used left hand to catch herself, now having pain in LEFT thumb joint. Per EMS, LEFT hand noted to be swollen and discoloration of skin noted. Pt states after lying on ground she did extend head back, hitting it on the brick wall. Small raised bump noted, -LOC. AOx4 PERRLA.  Chart up and ready for ERP now.

## 2019-05-11 NOTE — ED NOTES
Pt discharged home. Explained discharge and medication instructions. Questions and comments addressed. Pt verbalized understanding of instructions. Pt advised to follow-up with Ortho or return to ED for any new or worsening of symptoms. Pt is ambulating well and steady on feet. VS stable. Pt's friends at bedside and will be driving pt home.

## 2019-07-22 DIAGNOSIS — F33.0 MAJOR DEPRESSIVE DISORDER, RECURRENT EPISODE, MILD (HCC): ICD-10-CM

## 2019-07-22 RX ORDER — SERTRALINE HYDROCHLORIDE 100 MG/1
TABLET, FILM COATED ORAL
Qty: 180 TAB | Refills: 0 | Status: SHIPPED | OUTPATIENT
Start: 2019-07-22 | End: 2019-08-12 | Stop reason: SDUPTHER

## 2019-08-12 ENCOUNTER — OFFICE VISIT (OUTPATIENT)
Dept: MEDICAL GROUP | Facility: PHYSICIAN GROUP | Age: 79
End: 2019-08-12
Payer: MEDICARE

## 2019-08-12 VITALS
TEMPERATURE: 97.3 F | BODY MASS INDEX: 23.99 KG/M2 | HEART RATE: 57 BPM | OXYGEN SATURATION: 96 % | HEIGHT: 69 IN | WEIGHT: 162 LBS | RESPIRATION RATE: 16 BRPM | SYSTOLIC BLOOD PRESSURE: 140 MMHG | DIASTOLIC BLOOD PRESSURE: 72 MMHG
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
## 2019-08-12 DIAGNOSIS — M19.90 ARTHRITIS: ICD-10-CM

## 2019-08-12 DIAGNOSIS — F33.42 RECURRENT MAJOR DEPRESSIVE DISORDER, IN FULL REMISSION (HCC): ICD-10-CM

## 2019-08-12 DIAGNOSIS — M25.532 LEFT WRIST PAIN: ICD-10-CM

## 2019-08-12 PROCEDURE — 99214 OFFICE O/P EST MOD 30 MIN: CPT | Performed by: FAMILY MEDICINE

## 2019-08-12 RX ORDER — SERTRALINE HYDROCHLORIDE 100 MG/1
50-100 TABLET, FILM COATED ORAL DAILY
Qty: 1 TAB | Refills: 0
Start: 2019-08-12 | End: 2020-06-08

## 2019-08-12 NOTE — PROGRESS NOTES
"Subjective:   Bernie Barrientos is a 78 y.o. female here today for a follow-up of her depression as well as acute complaints of left wrist pain from a fall. She is unaccompanied for today's visit.     Fall from ground level  Left wrist pain  She is currently wearing a brace on her left wrist for swelling and pain in her left thumb. She notes she tripped while walking in her garden and landed on this area. She was seen in the ER at the time on May 11th, and she was not found to have any fractures. It has improved in terms of swelling and mobility. She does noticed that her thumb \"droops down.\" She was recommended to see a hand specialist, but she adds she hasn't because it has improved.       Recurrent major depressive disorder, in full remission   Chronic and improved. She recently decreased her dosage of Sertraline from 100 mg 2 tablets daily to 1 tablet daily because she was running low on tablets. She states that since then her mood has greatly improved, and she feels like she has more energy. She would like to continue decreasing her dosage with the eventual goal of ceasing the medication.     Arthritis  Chronic and slightly worsened. She reports having mild/moderate right knee pain that is mostly worse while walking.     Current medicines (including changes today)  Current Outpatient Medications   Medication Sig Dispense Refill   • sertraline (ZOLOFT) 100 MG Tab Take 0.5-1 Tabs by mouth every day. TAKE 2 TABLETS BY MOUTH ONCE DAILY 1 Tab 0   • levothyroxine (SYNTHROID) 100 MCG Tab Take 1 Tab by mouth every day. 90 Tab 3   • Collagen 500 MG Cap Take 500 mg by mouth every day.     • Lactobacillus (PROBIOTIC ACIDOPHILUS PO) Take 1 Cap by mouth every day.     • Multiple Vitamin (MULTI VITAMIN DAILY PO) Take 1 Tab by mouth every day.     • Omega-3 Fatty Acids (OMEGA 3 PO) Take 1 Cap by mouth every day.     • vitamin D (CHOLECALCIFEROL) 1000 UNIT Tab Take 1,000 Units by mouth every day.     • BIOTIN 5000 PO Take 1 Tab " "by mouth every day.     • Magnesium 250 MG Tab Take 1 Tab by mouth every day.     • Potassium 99 MG Tab Take 1 Tab by mouth every day.     • aspirin 81 MG tablet Take 81 mg by mouth every day.     • Glucosamine HCl (GLUCOSAMINE PO) Take 1 Tab by mouth every day.       No current facility-administered medications for this visit.      She  has a past medical history of Anesthesia, Arthritis (2003), Colon polyp, Colon polyps, Dental disorder, DJD (degenerative joint disease), Heart valve disease (2005), Hypothyroidism, Major depression (5/2/2013), Migraine, and Postmenopausal hormone replacement therapy. She also has no past medical history of Backpain, Breast cancer (HCC), COPD, or Fall.    ROS  No chest pain, no shortness of breath, no abdominal pain.     Objective:     Physical Exam:  /72   Pulse (!) 57   Temp 36.3 °C (97.3 °F)   Resp 16   Ht 1.753 m (5' 9\")   Wt 73.5 kg (162 lb)   SpO2 96%  Body mass index is 23.92 kg/m². Constitutional: Alert, no distress, well-groomed.  Skin: Warm, dry, good turgor, no rashes in visible areas.  Eye: Equal, round and reactive, conjunctiva clear, lids normal.  ENMT: Lips without lesions, good dentition, moist mucous membranes.  Neck: Trachea midline, no masses, no thyromegaly.  Respiratory: Unlabored respiratory effort, no cough.  Abdomen: Soft, no gross masses.  MSK: Normal gait, moves all extremities. Left wrist in brace.  Neuro: Grossly non-focal. No cranial nerve deficit. Strength and sensation intact.   Psych: Alert and oriented x3, normal affect and mood.    Assessment and Plan:     1. Fall from ground level  2. Left wrist pain  This is a new issue. Her pain has improved since onset. She would not like a referral to orthopedics or PT at this time. Advised her to take OTC analgesics for her pain.     3. Recurrent major depressive disorder, in full remission (HCC)  Advised patient to decrease her dosage to a half-tablet for the next month. She will contact me in " regards to her mood, and if she is improved, I will send in another prescription with the proper dosage. We will continue to taper her dosage down as per patient's request and mood. Advised patient that she should avoid stopping the medication abruptly.   - sertraline (ZOLOFT) 100 MG Tab; Take 0.5-1 Tabs by mouth every day. TAKE 2 TABLETS BY MOUTH ONCE DAILY  Dispense: 1 Tab; Refill: 0    4. Arthritis  Advised her to take OTC analgesics but to avoid NSAIDs secondary to her history of CKD.       Followup: Return in about 7 months (around 3/12/2020) for AWV.          IYobani (Scribe), am scribing for, and in the presence of, Citlalli Lindsey MD    Electronically signed by: Yobani Macias (Scribe), 8/12/2019    Citlalli LING MD personally performed the services described in this documentation, as scribed by Yobani Macias in my presence, and it is both accurate and complete.

## 2019-10-22 ENCOUNTER — NON-PROVIDER VISIT (OUTPATIENT)
Dept: MEDICAL GROUP | Facility: PHYSICIAN GROUP | Age: 79
End: 2019-10-22
Payer: MEDICARE

## 2019-10-22 DIAGNOSIS — Z23 NEED FOR VACCINATION: ICD-10-CM

## 2019-10-22 PROCEDURE — G0008 ADMIN INFLUENZA VIRUS VAC: HCPCS | Performed by: FAMILY MEDICINE

## 2019-10-22 PROCEDURE — 90662 IIV NO PRSV INCREASED AG IM: CPT | Performed by: FAMILY MEDICINE

## 2019-10-22 NOTE — PROGRESS NOTES
"Bernie Barrientos is a 79 y.o. female here for a non-provider visit for:   FLU    Reason for immunization: Annual Flu Vaccine  Immunization records indicate need for vaccine: Yes, confirmed with Epic  Minimum interval has been met for this vaccine: Yes  ABN completed: Yes    Order and dose verified by: clementina TRACEY Dated  08/15/201 was given to patient: Yes  All IAC Questionnaire questions were answered \"No.\"    Patient tolerated injection and no adverse effects were observed or reported: Yes    Pt scheduled for next dose in series: No    "

## 2019-12-19 ENCOUNTER — TELEPHONE (OUTPATIENT)
Dept: MEDICAL GROUP | Facility: PHYSICIAN GROUP | Age: 79
End: 2019-12-19

## 2019-12-19 RX ORDER — LEVOTHYROXINE SODIUM 0.1 MG/1
100 TABLET ORAL DAILY
Qty: 90 TAB | Refills: 3 | Status: SHIPPED | OUTPATIENT
Start: 2019-12-19 | End: 2020-12-11

## 2020-02-11 ENCOUNTER — PATIENT OUTREACH (OUTPATIENT)
Dept: HEALTH INFORMATION MANAGEMENT | Facility: OTHER | Age: 80
End: 2020-02-11

## 2020-02-11 NOTE — PROGRESS NOTES
Outcome: Call back at a later time pt was leaving to an appt     Please transfer to Patient Outreach Team at [phone] when patient returns call.    [facility] Verified: yes    Attempt # 1

## 2020-02-11 NOTE — PROGRESS NOTES
1. [facility] Verified: yes    2. Verify PCP: yes    3. Review and add  to Care Team: yes    4. Reviewed/Updated the following with patient:       •   Communication Preference Obtained? YES  • MyChart Activation: already active       •   E-Mail Address Obtained? YES       •   Appointment Day and Time Preferences? YES       •   Preferred Pharmacy? YES       •   Preferred Lab? YES    5. Care Gap Scheduling (Attempt to Schedule EACH Overdue Care Gap!)    Scheduled patient for Annual Wellness Visit

## 2020-03-23 ENCOUNTER — APPOINTMENT (OUTPATIENT)
Dept: MEDICAL GROUP | Facility: PHYSICIAN GROUP | Age: 80
End: 2020-03-23
Payer: MEDICARE

## 2020-06-07 DIAGNOSIS — F33.42 RECURRENT MAJOR DEPRESSIVE DISORDER, IN FULL REMISSION (HCC): ICD-10-CM

## 2020-06-08 RX ORDER — SERTRALINE HYDROCHLORIDE 100 MG/1
TABLET, FILM COATED ORAL
Qty: 180 TAB | Refills: 3 | Status: SHIPPED | OUTPATIENT
Start: 2020-06-08 | End: 2020-06-18 | Stop reason: SDUPTHER

## 2020-06-18 DIAGNOSIS — F33.42 RECURRENT MAJOR DEPRESSIVE DISORDER, IN FULL REMISSION (HCC): ICD-10-CM

## 2020-06-18 RX ORDER — SERTRALINE HYDROCHLORIDE 100 MG/1
200 TABLET, FILM COATED ORAL DAILY
Qty: 180 TAB | Refills: 1 | Status: SHIPPED | OUTPATIENT
Start: 2020-06-18 | End: 2021-01-29 | Stop reason: SDUPTHER

## 2020-07-02 ENCOUNTER — TELEPHONE (OUTPATIENT)
Dept: MEDICAL GROUP | Facility: PHYSICIAN GROUP | Age: 80
End: 2020-07-02

## 2020-07-02 NOTE — TELEPHONE ENCOUNTER
ESTABLISHED PATIENT PRE-VISIT PLANNING     Patient was NOT contacted to complete PVP.     Note: Patient will not be contacted if there is no indication to call.     1.  Reviewed notes from the last few office visits within the medical group: Yes    2.  If any orders were placed at last visit or intended to be done for this visit (i.e. 6 mos follow-up), do we have Results/Consult Notes?        •  Labs - Labs were not ordered at last office visit.   Note: If patient appointment is for lab review and patient did not complete labs, check with provider if OK to reschedule patient until labs completed.       •  Imaging - Imaging was not ordered at last office visit.       •  Referrals - No referrals were ordered at last office visit.    3. Is this appointment scheduled as a Hospital Follow-Up? No    4.  Immunizations were updated in Epic using WebIZ?: Epic matches WebIZ       •  Web Iz Recommendations: SHINGRIX (Shingles)    5.  Patient is due for the following Health Maintenance Topics:   Health Maintenance Due   Topic Date Due   • Annual Wellness Visit  04/17/2019       - Patient plans to schedule appointment for Annual Wellness Visit (AWV).    6. Orders for overdue Health Maintenance topics pended in Pre-Charting? N\A    7.  AHA (MDX) form printed for Provider? YES    8.  Patient was NOT informed to arrive 15 min prior to their scheduled appointment and bring in their medication bottles.

## 2020-07-06 ENCOUNTER — APPOINTMENT (OUTPATIENT)
Dept: MEDICAL GROUP | Facility: PHYSICIAN GROUP | Age: 80
End: 2020-07-06
Payer: MEDICARE

## 2020-07-08 ENCOUNTER — OFFICE VISIT (OUTPATIENT)
Dept: MEDICAL GROUP | Facility: PHYSICIAN GROUP | Age: 80
End: 2020-07-08
Payer: MEDICARE

## 2020-07-08 VITALS
WEIGHT: 163 LBS | RESPIRATION RATE: 16 BRPM | BODY MASS INDEX: 24.14 KG/M2 | TEMPERATURE: 97.8 F | HEART RATE: 56 BPM | OXYGEN SATURATION: 97 % | HEIGHT: 69 IN | DIASTOLIC BLOOD PRESSURE: 68 MMHG | SYSTOLIC BLOOD PRESSURE: 132 MMHG

## 2020-07-08 DIAGNOSIS — E78.00 PURE HYPERCHOLESTEROLEMIA: ICD-10-CM

## 2020-07-08 DIAGNOSIS — N18.30 STAGE 3 CHRONIC KIDNEY DISEASE: ICD-10-CM

## 2020-07-08 DIAGNOSIS — F33.1 MODERATE EPISODE OF RECURRENT MAJOR DEPRESSIVE DISORDER (HCC): ICD-10-CM

## 2020-07-08 DIAGNOSIS — Z00.00 MEDICARE ANNUAL WELLNESS VISIT, SUBSEQUENT: ICD-10-CM

## 2020-07-08 DIAGNOSIS — R53.83 OTHER FATIGUE: ICD-10-CM

## 2020-07-08 DIAGNOSIS — E03.9 ACQUIRED HYPOTHYROIDISM: ICD-10-CM

## 2020-07-08 PROCEDURE — G0439 PPPS, SUBSEQ VISIT: HCPCS | Performed by: FAMILY MEDICINE

## 2020-07-08 PROCEDURE — 8041 PR SCP AHA: Performed by: FAMILY MEDICINE

## 2020-07-08 ASSESSMENT — ENCOUNTER SYMPTOMS: GENERAL WELL-BEING: GOOD

## 2020-07-08 ASSESSMENT — PATIENT HEALTH QUESTIONNAIRE - PHQ9
SUM OF ALL RESPONSES TO PHQ QUESTIONS 1-9: 9
CLINICAL INTERPRETATION OF PHQ2 SCORE: 2
5. POOR APPETITE OR OVEREATING: 1 - SEVERAL DAYS

## 2020-07-08 ASSESSMENT — ACTIVITIES OF DAILY LIVING (ADL): BATHING_REQUIRES_ASSISTANCE: 0

## 2020-07-08 NOTE — PROGRESS NOTES
Chief Complaint   Patient presents with   • Annual Exam     AWV     HPI:  Bernie Barrientos is a 79 y.o. here for Medicare Annual Wellness Visit.     She tells me that she weaned herself down on her sertraline.  A few months ago, with COVID-19 quarantine, she went back to 100mg daily.  She still feels a bit sad at times and thinks she will go up more.  She has taken as much as 200mg in the past.    Patient Active Problem List    Diagnosis Date Noted   • DJD (degenerative joint disease)      Priority: Low   • Other fatigue 04/27/2018   • Risk for falls 04/16/2018   • Sciatica of left side 09/14/2017   • Heart murmur 09/14/2017   • Seasonal allergic rhinitis due to pollen 06/02/2017   • Pure hypercholesterolemia 06/02/2017   • Recurrent major depressive disorder, in partial remission (HCC) 09/15/2016   • Acquired hypothyroidism 11/30/2015   • Stage 3 chronic kidney disease (HCC) 11/30/2015       Current Outpatient Medications   Medication Sig Dispense Refill   • sertraline (ZOLOFT) 100 MG Tab Take 2 Tabs by mouth every day. 180 Tab 1   • levothyroxine (SYNTHROID) 100 MCG Tab Take 1 Tab by mouth every day. 90 Tab 3   • Collagen 500 MG Cap Take 500 mg by mouth every day.     • Lactobacillus (PROBIOTIC ACIDOPHILUS PO) Take 1 Cap by mouth every day.     • Multiple Vitamin (MULTI VITAMIN DAILY PO) Take 1 Tab by mouth every day.     • Omega-3 Fatty Acids (OMEGA 3 PO) Take 1 Cap by mouth every day.     • vitamin D (CHOLECALCIFEROL) 1000 UNIT Tab Take 1,000 Units by mouth every day.     • BIOTIN 5000 PO Take 1 Tab by mouth every day.     • Magnesium 250 MG Tab Take 1 Tab by mouth every day.     • Potassium 99 MG Tab Take 1 Tab by mouth every day.     • aspirin 81 MG tablet Take 81 mg by mouth every day.     • Glucosamine HCl (GLUCOSAMINE PO) Take 1 Tab by mouth every day.       No current facility-administered medications for this visit.           Current supplements as per medication list.     Allergies: Naprosyn [naproxen] and  Pcn [penicillins]    Current social contact/activities: reading, having a martini with a friend, watching TV     She  reports that she quit smoking about 40 years ago. Her smoking use included cigarettes. She has a 8.00 pack-year smoking history. She has never used smokeless tobacco. She reports current alcohol use of about 0.5 oz of alcohol per week. She reports that she does not use drugs.  Counseling given: Not Answered  Comment: quit 1975    DPA/Advanced Directive:  Patient has Advanced Directive on file.     ROS:    Gait: Uses no assistive device  Ostomy: No  Other tubes: No  Amputations: No  Chronic oxygen use: No  Wears hearing aids: No   : Denies any urinary leakage during the last 6 months    Screening:    Depression Screening  Little interest or pleasure in doing things?  1 - several days  Feeling down, depressed , or hopeless? 1 - several days  Trouble falling or staying asleep, or sleeping too much?  3 - nearly every day  Feeling tired or having little energy?  3 - nearly every day  Poor appetite or overeating?  1 - several days  Feeling bad about yourself - or that you are a failure or have let yourself or your family down? 0 - not at all  Trouble concentrating on things, such as reading the newspaper or watching television? 0 - not at all  Moving or speaking so slowly that other people could have noticed.  Or the opposite - being so fidgety or restless that you have been moving around a lot more than usual?  0 - not at all  Thoughts that you would be better off dead, or of hurting yourself?  0 - not at all  Patient Health Questionnaire Score: 9    If depressive symptoms identified deferred to follow up visit unless specifically addressed in assessment and plan.    Interpretation of PHQ-9 Total Score   Score Severity   1-4 No Depression   5-9 Mild Depression   10-14 Moderate Depression   15-19 Moderately Severe Depression   20-27 Severe Depression    Screening for Cognitive Impairment  Three Minute  Recall (river, nation, finger) 2/3    Og clock face with all 12 numbers and set the hands to show 10 past 11.  Yes Pt did not put all numbers    Cognitive concerns identified deferred for follow up unless specifically addressed in assessment and plan.    Fall Risk Assessment  Has the patient had two or more falls in the last year or any fall with injury in the last year?  No    Safety Assessment  Throw rugs on floor.  No  Handrails on all stairs.  Yes  Good lighting in all hallways.  Yes  Difficulty hearing.  No  Patient counseled about all safety risks that were identified.    Functional Assessment ADLs  Are there any barriers preventing you from cooking for yourself or meeting nutritional needs?  No.    Are there any barriers preventing you from driving safely or obtaining transportation?  No.    Are there any barriers preventing you from using a telephone or calling for help?  No.    Are there any barriers preventing you from shopping?  No.    Are there any barriers preventing you from taking care of your own finances?  No.    Are there any barriers preventing you from managing your medications?  No.    Are there any barriers preventing you from showering, bathing or dressing yourself? No.    Are you currently engaging in any exercise or physical activity?  No.     What is your perception of your health?  Good.    Health Maintenance Summary                Annual Wellness Visit Overdue 4/17/2019      Done 4/16/2018 Visit Dx: Medicare annual wellness visit, subsequent     Patient has more history with this topic...    IMM ZOSTER VACCINES Postponed 8/11/2020 Originally 9/7/1989. System: vaccine not available, other system reasons    IMM INFLUENZA Next Due 9/1/2020      Done 10/22/2019 Imm Admin: Influenza Vaccine Adult HD     Patient has more history with this topic...    BONE DENSITY Next Due 2/21/2022      Done 2/21/2017 DS-BONE DENSITY STUDY (DEXA)     Patient has more history with this topic...    COLONOSCOPY  Next Due 2024      Not specified 2014      Patient has more history with this topic...    IMM DTaP/Tdap/Td Vaccine Next Due 2024      Done 2014 Imm Admin: Tdap Vaccine        Patient Care Team:  Citlalli Lindsey M.D. as PCP - General (Family Medicine)  Clem Harrison M.D. as Consulting Physician (Ophthalmology)  Javier Gould M.D. (Gastroenterology)  Ted Johnson Ass't as      Social History     Tobacco Use   • Smoking status: Former Smoker     Packs/day: 1.00     Years: 8.00     Pack years: 8.00     Types: Cigarettes     Last attempt to quit: 1980     Years since quittin.5   • Smokeless tobacco: Never Used   • Tobacco comment: quit    Substance Use Topics   • Alcohol use: Yes     Alcohol/week: 0.5 oz     Types: 1 Glasses of wine per week     Comment: two per week   • Drug use: No     Family History   Problem Relation Age of Onset   • Cancer Mother         colon   • Diabetes Mother    • Thyroid Sister    • No Known Problems Father         Passed away before the pt was born    • Diabetes Unknown    • Heart Disease Neg Hx    • Hypertension Neg Hx    • Hyperlipidemia Neg Hx      She  has a past medical history of Anesthesia, Arthritis (), Colon polyp, Colon polyps, Dental disorder, DJD (degenerative joint disease), Heart valve disease (), Hypothyroidism, Major depression (2013), Migraine, and Postmenopausal hormone replacement therapy. She also has no past medical history of Backpain, Breast cancer (HCC), COPD, or Fall.     Past Surgical History:   Procedure Laterality Date   • COLONOSCOPY WITH POLYP  8/15/2013    Performed by Javier Gould M.D. at [facility]   • HAND SURGERY      right index finger joint reconstruction   • HAND SURGERY      bilateral thumb reconstruction   • HIP REPLACEMENT, TOTAL      bilateral   • ABDOMINAL HYSTERECTOMY TOTAL     • APPENDECTOMY     • ARTHROPLASTY Bilateral     hips   •  "LAMINOTOMY      4,5,6,7 neck fusion   • OTHER NEUROLOGICAL SURG  2004/2008    spinal fusion C4-7   • TONSILLECTOMY  as child     Exam:   /68   Pulse (!) 56   Temp 36.6 °C (97.8 °F)   Resp 16   Ht 1.753 m (5' 9\")   Wt 73.9 kg (163 lb)   SpO2 97%  Body mass index is 24.07 kg/m².    Hearing good.    Dentition good  Alert, oriented in no acute distress.  Eye contact is good, speech goal directed, affect calm  Heart RRR with murmur  Lungs CTAB without wheeze    Assessment and Plan. The following treatment and monitoring plan is recommended:    1. Medicare annual wellness visit, subsequent  HRA reviewed and appropriate.  Patient's previous medical history, healthcare maintenance and immunization status reviewed.  See discussion of anticipatory guidance and individual problems below.  Patient will return annually for Medicare annual well visit.    2. Moderate episode of recurrent major depressive disorder (HCC)  Worsening.  Denies crisis.  Encouraged her to titrate up to 150mg.  Discussed community resources to limit social isolation.  Will continue to monitor.  She is not interested in therapy at this time.   3. Stage 3 chronic kidney disease (HCC)  This is a chronic and stable problem.  Labs reviewed with the patient.  Discussed avoidance of nephrotoxic drugs.  Continue hypertensive management.  Continue to monitor.   4. Pure hypercholesterolemia  Chronic and stable.  Managing with her own efforts.  Recheck labs.  Lipid Profile    Comp Metabolic Panel   5. Acquired hypothyroidism  Continue thyroid medication.  Instructed patient to take on empty stomach with glass of water, 30 minutes prior to food or other medications.  Recheck TSH given worsening depression.  TSH     Services suggested: No services needed at this time  Health Care Screening: Age-appropriate preventive services recommended by USPTF and ACIP covered by Medicare were discussed today. Services ordered if indicated and agreed upon by the " patient.  Referrals offered: Community-based lifestyle interventions to reduce health risks and promote self-management and wellness, fall prevention, nutrition, physical activity, tobacco-use cessation, weight loss, and mental health services as per orders if indicated.    Discussion today about general wellness and lifestyle habits:    · Prevent falls and reduce trip hazards; Cautioned about securing or removing rugs.  · Have a working fire alarm and carbon monoxide detector;   · Engage in regular physical activity and social activities     Follow-up: No follow-ups on file.

## 2020-07-27 ENCOUNTER — HOSPITAL ENCOUNTER (OUTPATIENT)
Dept: LAB | Facility: MEDICAL CENTER | Age: 80
End: 2020-07-27
Attending: FAMILY MEDICINE
Payer: MEDICARE

## 2020-07-27 DIAGNOSIS — E03.9 ACQUIRED HYPOTHYROIDISM: ICD-10-CM

## 2020-07-27 DIAGNOSIS — R53.83 OTHER FATIGUE: ICD-10-CM

## 2020-07-27 DIAGNOSIS — E78.00 PURE HYPERCHOLESTEROLEMIA: ICD-10-CM

## 2020-07-27 LAB
ALBUMIN SERPL BCP-MCNC: 4.4 G/DL (ref 3.2–4.9)
ALBUMIN/GLOB SERPL: 2.1 G/DL
ALP SERPL-CCNC: 54 U/L (ref 30–99)
ALT SERPL-CCNC: 12 U/L (ref 2–50)
ANION GAP SERPL CALC-SCNC: 12 MMOL/L (ref 7–16)
AST SERPL-CCNC: 15 U/L (ref 12–45)
BASOPHILS # BLD AUTO: 1.1 % (ref 0–1.8)
BASOPHILS # BLD: 0.05 K/UL (ref 0–0.12)
BILIRUB SERPL-MCNC: 0.3 MG/DL (ref 0.1–1.5)
BUN SERPL-MCNC: 16 MG/DL (ref 8–22)
CALCIUM SERPL-MCNC: 9.3 MG/DL (ref 8.5–10.5)
CHLORIDE SERPL-SCNC: 104 MMOL/L (ref 96–112)
CHOLEST SERPL-MCNC: 186 MG/DL (ref 100–199)
CO2 SERPL-SCNC: 21 MMOL/L (ref 20–33)
CREAT SERPL-MCNC: 1 MG/DL (ref 0.5–1.4)
EOSINOPHIL # BLD AUTO: 0.14 K/UL (ref 0–0.51)
EOSINOPHIL NFR BLD: 3 % (ref 0–6.9)
ERYTHROCYTE [DISTWIDTH] IN BLOOD BY AUTOMATED COUNT: 48.6 FL (ref 35.9–50)
FASTING STATUS PATIENT QL REPORTED: NORMAL
GLOBULIN SER CALC-MCNC: 2.1 G/DL (ref 1.9–3.5)
GLUCOSE SERPL-MCNC: 103 MG/DL (ref 65–99)
HCT VFR BLD AUTO: 44 % (ref 37–47)
HDLC SERPL-MCNC: 60 MG/DL
HGB BLD-MCNC: 14.3 G/DL (ref 12–16)
IMM GRANULOCYTES # BLD AUTO: 0.01 K/UL (ref 0–0.11)
IMM GRANULOCYTES NFR BLD AUTO: 0.2 % (ref 0–0.9)
LDLC SERPL CALC-MCNC: 105 MG/DL
LYMPHOCYTES # BLD AUTO: 1.16 K/UL (ref 1–4.8)
LYMPHOCYTES NFR BLD: 24.5 % (ref 22–41)
MCH RBC QN AUTO: 29.7 PG (ref 27–33)
MCHC RBC AUTO-ENTMCNC: 32.5 G/DL (ref 33.6–35)
MCV RBC AUTO: 91.3 FL (ref 81.4–97.8)
MONOCYTES # BLD AUTO: 0.38 K/UL (ref 0–0.85)
MONOCYTES NFR BLD AUTO: 8 % (ref 0–13.4)
NEUTROPHILS # BLD AUTO: 2.99 K/UL (ref 2–7.15)
NEUTROPHILS NFR BLD: 63.2 % (ref 44–72)
NRBC # BLD AUTO: 0 K/UL
NRBC BLD-RTO: 0 /100 WBC
PLATELET # BLD AUTO: 255 K/UL (ref 164–446)
PMV BLD AUTO: 10.7 FL (ref 9–12.9)
POTASSIUM SERPL-SCNC: 4.4 MMOL/L (ref 3.6–5.5)
PROT SERPL-MCNC: 6.5 G/DL (ref 6–8.2)
RBC # BLD AUTO: 4.82 M/UL (ref 4.2–5.4)
SODIUM SERPL-SCNC: 137 MMOL/L (ref 135–145)
TRIGL SERPL-MCNC: 105 MG/DL (ref 0–149)
TSH SERPL DL<=0.005 MIU/L-ACNC: 1.25 UIU/ML (ref 0.38–5.33)
VIT B12 SERPL-MCNC: 1111 PG/ML (ref 211–911)
WBC # BLD AUTO: 4.7 K/UL (ref 4.8–10.8)

## 2020-07-27 PROCEDURE — 36415 COLL VENOUS BLD VENIPUNCTURE: CPT

## 2020-07-27 PROCEDURE — 82607 VITAMIN B-12: CPT

## 2020-07-27 PROCEDURE — 84443 ASSAY THYROID STIM HORMONE: CPT

## 2020-07-27 PROCEDURE — 85025 COMPLETE CBC W/AUTO DIFF WBC: CPT

## 2020-07-27 PROCEDURE — 80061 LIPID PANEL: CPT

## 2020-07-27 PROCEDURE — 80053 COMPREHEN METABOLIC PANEL: CPT

## 2020-10-05 ENCOUNTER — OFFICE VISIT (OUTPATIENT)
Dept: MEDICAL GROUP | Facility: PHYSICIAN GROUP | Age: 80
End: 2020-10-05
Payer: MEDICARE

## 2020-10-05 VITALS
OXYGEN SATURATION: 98 % | SYSTOLIC BLOOD PRESSURE: 130 MMHG | RESPIRATION RATE: 16 BRPM | TEMPERATURE: 97.8 F | WEIGHT: 162 LBS | HEIGHT: 69 IN | DIASTOLIC BLOOD PRESSURE: 54 MMHG | BODY MASS INDEX: 23.99 KG/M2 | HEART RATE: 67 BPM

## 2020-10-05 DIAGNOSIS — Z23 NEED FOR VACCINATION: ICD-10-CM

## 2020-10-05 DIAGNOSIS — F33.41 RECURRENT MAJOR DEPRESSIVE DISORDER, IN PARTIAL REMISSION (HCC): ICD-10-CM

## 2020-10-05 PROCEDURE — 90750 HZV VACC RECOMBINANT IM: CPT | Performed by: PEDIATRICS

## 2020-10-05 PROCEDURE — 99213 OFFICE O/P EST LOW 20 MIN: CPT | Mod: 25 | Performed by: FAMILY MEDICINE

## 2020-10-05 PROCEDURE — 90471 IMMUNIZATION ADMIN: CPT | Performed by: PEDIATRICS

## 2020-10-05 ASSESSMENT — FIBROSIS 4 INDEX: FIB4 SCORE: 1.375452111892931968

## 2020-10-05 NOTE — PROGRESS NOTES
Subjective:   Bernie Barrientos is a 80 y.o. female here today for follow-up depression and lab results review.  She is unaccompanied for today's appointment.    Depression  Mood improved with current medication and therapy. Taking medications as prescribed without side effects. Patient denies SI/HI. Still meeting with her friends once a week for martinis (although outside given COVID-19). She does struggle with isolation.  She does have a lot of support from friends in her neighborhood.  Also has a cat.    We reviewed her labs which did not show any significant abnormalities compared to prior.    Current medicines (including changes today)  Current Outpatient Medications   Medication Sig Dispense Refill   • sertraline (ZOLOFT) 100 MG Tab Take 2 Tabs by mouth every day. 180 Tab 1   • levothyroxine (SYNTHROID) 100 MCG Tab Take 1 Tab by mouth every day. 90 Tab 3   • Collagen 500 MG Cap Take 500 mg by mouth every day.     • Lactobacillus (PROBIOTIC ACIDOPHILUS PO) Take 1 Cap by mouth every day.     • Multiple Vitamin (MULTI VITAMIN DAILY PO) Take 1 Tab by mouth every day.     • Omega-3 Fatty Acids (OMEGA 3 PO) Take 1 Cap by mouth every day.     • vitamin D (CHOLECALCIFEROL) 1000 UNIT Tab Take 1,000 Units by mouth every day.     • BIOTIN 5000 PO Take 1 Tab by mouth every day.     • Magnesium 250 MG Tab Take 1 Tab by mouth every day.     • Potassium 99 MG Tab Take 1 Tab by mouth every day.     • aspirin 81 MG tablet Take 81 mg by mouth every day.     • Glucosamine HCl (GLUCOSAMINE PO) Take 1 Tab by mouth every day.       No current facility-administered medications for this visit.      She  has a past medical history of Anesthesia, Arthritis (2003), Colon polyp, Colon polyps, Dental disorder, DJD (degenerative joint disease), Heart valve disease (2005), Hypothyroidism, Major depression (5/2/2013), Migraine, and Postmenopausal hormone replacement therapy. She also has no past medical history of Backpain, Breast cancer (HCC),  "COPD, or Fall.    ROS   No chest pain, no shortness of breath, no abdominal pain. Positive for fatigue.     Objective:     Physical Exam:  /54 (BP Location: Left arm, Patient Position: Sitting, BP Cuff Size: Adult)   Pulse 67   Temp 36.6 °C (97.8 °F)   Resp 16   Ht 1.753 m (5' 9\")   Wt 73.5 kg (162 lb)   SpO2 98%  Body mass index is 23.92 kg/m².   Constitutional: Alert, no distress, well-groomed.  Skin: Warm, dry, good turgor, no rashes in visible areas.  Eye: Equal, round and reactive, conjunctiva clear, lids normal.  ENMT: +Mask  Neck: Trachea midline, no masses, no thyromegaly.  Respiratory: Unlabored respiratory effort, no cough.  Abdomen: Soft, no gross masses.  MSK: Normal gait, moves all extremities.  Neuro: Grossly non-focal. No cranial nerve deficit. Strength and sensation intact.   Psych: Alert and oriented x3, normal affect and mood.    Assessment and Plan:     1. Recurrent major depressive disorder, in partial remission (HCC)  Improved, now in partial remission.  Continue sertraline 200mg daily for now.  Discussed referral to therapy, patient declined.  Advised of senior activities in the area.  Will continue to monitor.    2. Need for vaccination  - Shingles Vaccine (Shingrix)    Followup: 6 months, sooner if needed         PLEASE NOTE: This dictation was created using voice recognition software. I have made every reasonable attempt to correct obvious errors, but I expect that there are errors of grammar and possibly content that I did not discover before finalizing the note.  "

## 2020-12-11 RX ORDER — LEVOTHYROXINE SODIUM 100 UG/1
TABLET ORAL
Qty: 90 TAB | Refills: 3 | Status: SHIPPED | OUTPATIENT
Start: 2020-12-11 | End: 2021-12-13 | Stop reason: SDUPTHER

## 2021-01-07 ENCOUNTER — NON-PROVIDER VISIT (OUTPATIENT)
Dept: MEDICAL GROUP | Facility: PHYSICIAN GROUP | Age: 81
End: 2021-01-07
Payer: MEDICARE

## 2021-01-07 DIAGNOSIS — Z23 NEED FOR VACCINATION: ICD-10-CM

## 2021-01-07 PROCEDURE — 90471 IMMUNIZATION ADMIN: CPT | Performed by: FAMILY MEDICINE

## 2021-01-07 PROCEDURE — 90750 HZV VACC RECOMBINANT IM: CPT | Performed by: FAMILY MEDICINE

## 2021-01-07 NOTE — PROGRESS NOTES
"Bernie Barrientos is a 80 y.o. female here for a non-provider visit for:   SHINGRIX (Shingles)    Reason for immunization: continue or complete series started at the office  Immunization records indicate need for vaccine: Yes, confirmed with Epic  Minimum interval has been met for this vaccine: Yes  ABN completed: Yes    Order and dose verified by: Emily TRACEY Dated  10/30/2019 was given to patient: Yes  All IAC Questionnaire questions were answered \"No.\"    Patient tolerated injection and no adverse effects were observed or reported: Yes    Pt scheduled for next dose in series: No    "

## 2021-01-11 DIAGNOSIS — Z23 NEED FOR VACCINATION: ICD-10-CM

## 2021-01-22 ENCOUNTER — IMMUNIZATION (OUTPATIENT)
Dept: FAMILY PLANNING/WOMEN'S HEALTH CLINIC | Facility: IMMUNIZATION CENTER | Age: 81
End: 2021-01-22
Attending: INTERNAL MEDICINE
Payer: MEDICARE

## 2021-01-22 DIAGNOSIS — Z23 ENCOUNTER FOR VACCINATION: Primary | ICD-10-CM

## 2021-01-22 DIAGNOSIS — Z23 NEED FOR VACCINATION: ICD-10-CM

## 2021-01-22 PROCEDURE — 0001A PFIZER SARS-COV-2 VACCINE: CPT | Performed by: INTERNAL MEDICINE

## 2021-01-22 PROCEDURE — 91300 PFIZER SARS-COV-2 VACCINE: CPT | Performed by: INTERNAL MEDICINE

## 2021-01-29 DIAGNOSIS — F33.42 RECURRENT MAJOR DEPRESSIVE DISORDER, IN FULL REMISSION (HCC): ICD-10-CM

## 2021-01-29 RX ORDER — SERTRALINE HYDROCHLORIDE 100 MG/1
200 TABLET, FILM COATED ORAL DAILY
Qty: 180 TAB | Refills: 0 | Status: SHIPPED | OUTPATIENT
Start: 2021-01-29 | End: 2021-05-10

## 2021-02-12 ENCOUNTER — IMMUNIZATION (OUTPATIENT)
Dept: FAMILY PLANNING/WOMEN'S HEALTH CLINIC | Facility: IMMUNIZATION CENTER | Age: 81
End: 2021-02-12
Attending: INTERNAL MEDICINE
Payer: MEDICARE

## 2021-02-12 DIAGNOSIS — Z23 ENCOUNTER FOR VACCINATION: Primary | ICD-10-CM

## 2021-02-12 PROCEDURE — 0002A PFIZER SARS-COV-2 VACCINE: CPT

## 2021-02-12 PROCEDURE — 91300 PFIZER SARS-COV-2 VACCINE: CPT

## 2021-04-09 ENCOUNTER — OFFICE VISIT (OUTPATIENT)
Dept: MEDICAL GROUP | Facility: PHYSICIAN GROUP | Age: 81
End: 2021-04-09
Payer: MEDICARE

## 2021-04-09 VITALS
WEIGHT: 158 LBS | HEIGHT: 69 IN | SYSTOLIC BLOOD PRESSURE: 114 MMHG | DIASTOLIC BLOOD PRESSURE: 56 MMHG | BODY MASS INDEX: 23.4 KG/M2 | TEMPERATURE: 97.1 F | RESPIRATION RATE: 14 BRPM | OXYGEN SATURATION: 91 % | HEART RATE: 63 BPM

## 2021-04-09 DIAGNOSIS — E03.9 ACQUIRED HYPOTHYROIDISM: ICD-10-CM

## 2021-04-09 DIAGNOSIS — F41.9 ANXIETY: ICD-10-CM

## 2021-04-09 DIAGNOSIS — E78.00 ELEVATED LDL CHOLESTEROL LEVEL: ICD-10-CM

## 2021-04-09 DIAGNOSIS — R53.83 OTHER FATIGUE: ICD-10-CM

## 2021-04-09 DIAGNOSIS — E55.9 VITAMIN D DEFICIENCY: ICD-10-CM

## 2021-04-09 DIAGNOSIS — N18.31 STAGE 3A CHRONIC KIDNEY DISEASE: ICD-10-CM

## 2021-04-09 DIAGNOSIS — G47.9 DIFFICULTY SLEEPING: ICD-10-CM

## 2021-04-09 DIAGNOSIS — F33.1 MODERATE EPISODE OF RECURRENT MAJOR DEPRESSIVE DISORDER (HCC): ICD-10-CM

## 2021-04-09 PROCEDURE — 99214 OFFICE O/P EST MOD 30 MIN: CPT | Performed by: FAMILY MEDICINE

## 2021-04-09 ASSESSMENT — FIBROSIS 4 INDEX: FIB4 SCORE: 1.375452111892931968

## 2021-04-09 NOTE — PROGRESS NOTES
"Subjective:   Bernie Barrientos is a 80 y.o. female here today for fatigue and difficulty sleeping.    For the last few months she has been feeling tired.  She tells me that she doesn't sleep well and believes this is contributing.  She has tried Sleepytime Tea     Going to bed between 10-11pm.  She wakes up often during the night.  Wakes up around 7am and lays in bed until 8am.  Not always easy to fall asleep or to fall back asleep.  Before going to bed she will watch TV.  She does have coffee, but only in the mornings.  She will have 1 or 2 drinks a week, she will usually spend this time with her neighbor for jackie.  She does feel anxious because of the pandemic.  She did not leave the house except to go to the grocery store.  She did get both of her COVID-19 vaccines and has been able to spend some time with her neighbors.    She does have depression and tells me that some of her depression symptoms have come back.  She is currently taking sertraline 200 mg daily.  Denies side effects.    She has had a very stable kidney levels going back several years.  She has hypothyroidism and takes levothyroxine daily.  She is due for labs.     Objective:     Physical Exam:  /56 (BP Location: Right arm, Patient Position: Sitting, BP Cuff Size: Adult)   Pulse 63   Temp 36.2 °C (97.1 °F) (Temporal)   Resp 14   Ht 1.753 m (5' 9\")   Wt 71.7 kg (158 lb)   SpO2 91%  Body mass index is 23.33 kg/m².     Constitutional: Alert, no distress.  Skin: Warm, dry, good turgor, no rashes in visible areas.  Eye: Equal, round and reactive, conjunctiva clear, lids normal.  ENMT: Lips without lesions, good dentition, oropharynx clear.  Neck: Trachea midline, no masses, no thyromegaly. No cervical or supraclavicular lymphadenopathy  Respiratory: Unlabored respiratory effort, lungs clear to auscultation, no wheezes, no ronchi.  Cardiovascular: Normal S1, S2, no murmur, no edema.  Abdomen: Soft, non-tender, no masses, no " hepatosplenomegaly.  Psych: Alert and oriented x3, normal affect and mood.    Assessment and Plan:     1. Other fatigue  2. Difficulty sleeping  3. Anxiety  4. Moderate episode of recurrent major depressive disorder (HCC)  Patient is reporting new fatigue, difficulty sleeping and anxiety.  She has chronic depression with current exacerbation.  It appears that a lot of this is a result of the COVID-19 pandemic and the isolation that she has felt over the last year.  She denies crisis today.  Her exam is reassuring.  I would like to order labs to look for other causes of fatigue.  We had a good chat about treatment for depression and anxiety.  She is on a pretty good dose of sertraline and would like to continue this.  I am hesitant to start her on Wellbutrin as this could make it more difficult for her to sleep.  I did offer her referral to therapy or psychiatry for medication management, but she would not like to pursue either at this time.  She does find it helpful to work in her garden and plans to start doing this this weekend.  She also has a good support system in her neighborhood as well and plans to continue socializing with them in a safe way.  - CBC WITH DIFFERENTIAL; Future  - VITAMIN B12; Future    5. Stage 3a chronic kidney disease  This is a chronic issue.  Patient's kidney levels have been stable for many, many years.  We will recheck labs.  - Comp Metabolic Panel; Future    6. Elevated LDL cholesterol level  Chronic and stable.  Recheck labs.  - Lipid Profile; Future    7. Acquired hypothyroidism  Continue thyroid medication.  Instructed patient to take on empty stomach with glass of water, 30 minutes prior to food or other medications.  Labs as indicated.  - TSH; Future    8. Vitamin D deficiency  Chronic and stable.  Continue supplement and recheck labs.  - VITAMIN D,25 HYDROXY; Future    Followup: Return in about 6 weeks for new patient appointment and follow-up fatigue and depression.          PLEASE NOTE: This dictation was created using voice recognition software. I have made every reasonable attempt to correct obvious errors, but I expect that there are errors of grammar and possibly content that I did not discover before finalizing the note.

## 2021-05-08 DIAGNOSIS — F33.42 RECURRENT MAJOR DEPRESSIVE DISORDER, IN FULL REMISSION (HCC): ICD-10-CM

## 2021-05-10 RX ORDER — SERTRALINE HYDROCHLORIDE 100 MG/1
TABLET, FILM COATED ORAL
Qty: 180 TABLET | Refills: 0 | Status: SHIPPED | OUTPATIENT
Start: 2021-05-10 | End: 2021-09-15 | Stop reason: SDUPTHER

## 2021-06-03 ENCOUNTER — OFFICE VISIT (OUTPATIENT)
Dept: MEDICAL GROUP | Facility: PHYSICIAN GROUP | Age: 81
End: 2021-06-03
Payer: MEDICARE

## 2021-06-03 VITALS
OXYGEN SATURATION: 96 % | TEMPERATURE: 97.1 F | HEIGHT: 69 IN | DIASTOLIC BLOOD PRESSURE: 72 MMHG | WEIGHT: 159.8 LBS | RESPIRATION RATE: 16 BRPM | BODY MASS INDEX: 23.67 KG/M2 | HEART RATE: 66 BPM | SYSTOLIC BLOOD PRESSURE: 118 MMHG

## 2021-06-03 DIAGNOSIS — F33.41 RECURRENT MAJOR DEPRESSIVE DISORDER, IN PARTIAL REMISSION (HCC): ICD-10-CM

## 2021-06-03 DIAGNOSIS — N18.31 STAGE 3A CHRONIC KIDNEY DISEASE: ICD-10-CM

## 2021-06-03 DIAGNOSIS — E03.9 ACQUIRED HYPOTHYROIDISM: ICD-10-CM

## 2021-06-03 DIAGNOSIS — R01.1 HEART MURMUR: ICD-10-CM

## 2021-06-03 PROBLEM — Z91.81 RISK FOR FALLS: Status: RESOLVED | Noted: 2018-04-16 | Resolved: 2021-06-03

## 2021-06-03 PROCEDURE — 99214 OFFICE O/P EST MOD 30 MIN: CPT | Performed by: FAMILY MEDICINE

## 2021-06-03 ASSESSMENT — FIBROSIS 4 INDEX: FIB4 SCORE: 1.375452111892931968

## 2021-06-03 NOTE — ASSESSMENT & PLAN NOTE
This is a chronic condition.  Onset: at least 2017  EGFR: 53 97/2020)  Albuminuria: due  Stage: TBD  Serum albumin: 4.4 (7/2020) - due 7/2021  Alk phos: 54 (7/2020) - no repeat necessary  Ca: 9.3 (7/2020) - due  Phos: due  Vit D: 36 (2018) - no repeat necessary  PTH: due  Anemia: due  Avoiding nephrotoxic agents? yes

## 2021-06-03 NOTE — ASSESSMENT & PLAN NOTE
This is a chronic condition.  Onset: 4-5 years (after  passed)  Duration: intermittent  Suicidal ideation/homicidal ideation: no/no  Therapy/counseling: no  Medications: sertraline 100 mg daily  Improving/worsening: controlled    She notes she almost ran out of sertraline so she dropped to 1 tab per day instead of 2 and it is working as well, if not better, than the higher dose.

## 2021-06-03 NOTE — ASSESSMENT & PLAN NOTE
This is a chronic condition. She is on levothyroxine 100 mcg daily. Last TSH in 7/2020 was wnl. She denies constipation and cold intolerance.

## 2021-06-03 NOTE — PROGRESS NOTES
Subjective:     CC:  Diagnoses of Acquired hypothyroidism, Recurrent major depressive disorder, in partial remission (HCC), Stage 3a chronic kidney disease (HCC), and Heart murmur were pertinent to this visit.    HISTORY OF THE PRESENT ILLNESS: Patient is a 80 y.o. female. This pleasant patient is here today to establish care. Her prior PCP was Chandrika Lindsey MD.    Acquired hypothyroidism  This is a chronic condition. She is on levothyroxine 100 mcg daily. Last TSH in 7/2020 was wnl. She denies constipation and cold intolerance.    Recurrent major depressive disorder, in partial remission (CMS-HCC) (HCC)  This is a chronic condition.  Onset: 4-5 years (after  passed)  Duration: intermittent  Suicidal ideation/homicidal ideation: no/no  Therapy/counseling: no  Medications: sertraline 100 mg daily  Improving/worsening: controlled    She notes she almost ran out of sertraline so she dropped to 1 tab per day instead of 2 and it is working as well, if not better, than the higher dose.     Stage 3 chronic kidney disease (HCC)  This is a chronic condition.  Onset: at least 2017  EGFR: 53 97/2020)  Albuminuria: due  Stage: TBD  Serum albumin: 4.4 (7/2020) - due 7/2021  Alk phos: 54 (7/2020) - no repeat necessary  Ca: 9.3 (7/2020) - due  Phos: due  Vit D: 36 (2018) - no repeat necessary  PTH: due  Anemia: due  Avoiding nephrotoxic agents? yes          Current Outpatient Medications Ordered in Epic   Medication Sig Dispense Refill   • sertraline (ZOLOFT) 100 MG Tab Take 2 tablets by mouth once daily 180 tablet 0   • EUTHYROX 100 MCG Tab Take 1 tablet by mouth once daily 90 Tab 3   • Collagen 500 MG Cap Take 500 mg by mouth every day.     • Lactobacillus (PROBIOTIC ACIDOPHILUS PO) Take 1 Cap by mouth every day.     • Multiple Vitamin (MULTI VITAMIN DAILY PO) Take 1 Tab by mouth every day.     • Omega-3 Fatty Acids (OMEGA 3 PO) Take 1 Cap by mouth every day.     • vitamin D (CHOLECALCIFEROL) 1000 UNIT Tab Take 1,000 Units by  "mouth every day.     • BIOTIN 5000 PO Take 1 Tab by mouth every day.     • Magnesium 250 MG Tab Take 1 Tab by mouth every day.     • Potassium 99 MG Tab Take 1 Tab by mouth every day.     • aspirin 81 MG tablet Take 81 mg by mouth every day.     • Glucosamine HCl (GLUCOSAMINE PO) Take 1 Tab by mouth every day.       No current [facility]-ordered facility-administered medications on file.       Health Maintenance: Completed    ROS:   Gen: no fevers/chills  Eyes: no changes in vision  ENT: no hearing loss  Pulm: no cough  CV:  no palpitations  GI: no diarrhea  : no dysuria  Skin: no rash      Objective:     Exam: /72 (BP Location: Left arm, Patient Position: Sitting, BP Cuff Size: Adult)   Pulse 66   Temp 36.2 °C (97.1 °F) (Temporal)   Resp 16   Ht 1.753 m (5' 9\")   Wt 72.5 kg (159 lb 12.8 oz)   SpO2 96%  Body mass index is 23.6 kg/m².    General: Normal appearing. No distress.  HEENT: Normocephalic. Eyes conjunctiva clear lids without ptosis, pupils equal and reactive to light accommodation, ears normal shape and contour, canals are clear bilaterally, tympanic membranes are benign, oropharynx is without erythema, edema or exudates.   Neck: Supple without JVD. Thyroid is not enlarged.  Pulmonary: Clear to ausculation.  Normal effort. No rales, ronchi, or wheezing.  Cardiovascular: Regular rate and rhythm with +2/6 crescendo-decrescendo murmur best heard in RUSB. Carotid and radial pulses are intact and equal bilaterally.  Abdomen: Soft, nontender, nondistended. Normal bowel sounds. Liver and spleen are not palpable  Neurologic: Grossly nonfocal  Lymph: No cervical or supraclavicular lymph nodes are palpable  Skin: Warm and dry.  No obvious lesions.  Musculoskeletal: Normal gait. No extremity cyanosis, clubbing, or edema.  Psych: Normal mood and affect. Alert and oriented x3. Judgment and insight is normal.    Assessment & Plan:   80 y.o. female with the following -    1. Acquired hypothyroidism  This is a " chronic condition, controlled.  She is on levothyroxine daily and tolerating it well.  Her last TSH in July, 2020 was within normal limits and we will continue current dosing.  -Continue levothyroxine 100 mcg daily    2. Recurrent major depressive disorder, in partial remission (HCC)  This is a chronic condition, in partial remission.  She is on sertraline daily reports it works very well to control her depression.  It was prescribed 200 mg a day but she was running low so she dropped it to 100 mg a day and reports it is working just as well for her depression so we will continue current dosing.  She denies any suicidal or homicidal ideation.  -Continue sertraline 100 mg daily    3. Stage 3a chronic kidney disease (HCC)  This is a chronic condition, status unknown.  She has not had lab work in nearly a year though she has had chronic kidney disease since at least 2017.  I have ordered lab work to have her chronic kidney disease evaluated and we should be monitoring this every 6 months.  - PHOSPHORUS; Future  - PTH INTACT (PTH ONLY); Future  - MICROALBUMIN CREAT RATIO URINE; Future    4. Heart murmur  This is a chronic condition.  She was not aware of a heart murmur that was listed in her chart.  On exam I do hear a crescendo decrescendo heart murmur best over the right upper sternal border.  I suspect aortic stenosis and we will go ahead and get an echocardiogram to evaluate it further.  - EC-ECHOCARDIOGRAM COMPLETE W/O CONT; Future    Return in about 6 months (around 12/3/2021) for Medicare Wellness.    Please note that this dictation was created using voice recognition software. I have made every reasonable attempt to correct obvious errors, but I expect that there are errors of grammar and possibly content that I did not discover before finalizing the note.

## 2021-07-28 ENCOUNTER — HOSPITAL ENCOUNTER (OUTPATIENT)
Dept: LAB | Facility: MEDICAL CENTER | Age: 81
End: 2021-07-28
Attending: FAMILY MEDICINE
Payer: MEDICARE

## 2021-07-28 DIAGNOSIS — E78.00 ELEVATED LDL CHOLESTEROL LEVEL: ICD-10-CM

## 2021-07-28 DIAGNOSIS — N18.31 STAGE 3A CHRONIC KIDNEY DISEASE: ICD-10-CM

## 2021-07-28 DIAGNOSIS — R53.83 OTHER FATIGUE: ICD-10-CM

## 2021-07-28 DIAGNOSIS — E03.9 ACQUIRED HYPOTHYROIDISM: ICD-10-CM

## 2021-07-28 DIAGNOSIS — E55.9 VITAMIN D DEFICIENCY: ICD-10-CM

## 2021-07-28 LAB
25(OH)D3 SERPL-MCNC: 56 NG/ML (ref 30–100)
BASOPHILS # BLD AUTO: 0.8 % (ref 0–1.8)
BASOPHILS # BLD: 0.04 K/UL (ref 0–0.12)
EOSINOPHIL # BLD AUTO: 0.15 K/UL (ref 0–0.51)
EOSINOPHIL NFR BLD: 2.9 % (ref 0–6.9)
ERYTHROCYTE [DISTWIDTH] IN BLOOD BY AUTOMATED COUNT: 49.8 FL (ref 35.9–50)
HCT VFR BLD AUTO: 46.6 % (ref 37–47)
HGB BLD-MCNC: 14.8 G/DL (ref 12–16)
IMM GRANULOCYTES # BLD AUTO: 0.02 K/UL (ref 0–0.11)
IMM GRANULOCYTES NFR BLD AUTO: 0.4 % (ref 0–0.9)
LYMPHOCYTES # BLD AUTO: 1.06 K/UL (ref 1–4.8)
LYMPHOCYTES NFR BLD: 20.8 % (ref 22–41)
MCH RBC QN AUTO: 29.4 PG (ref 27–33)
MCHC RBC AUTO-ENTMCNC: 31.8 G/DL (ref 33.6–35)
MCV RBC AUTO: 92.6 FL (ref 81.4–97.8)
MONOCYTES # BLD AUTO: 0.45 K/UL (ref 0–0.85)
MONOCYTES NFR BLD AUTO: 8.8 % (ref 0–13.4)
NEUTROPHILS # BLD AUTO: 3.38 K/UL (ref 2–7.15)
NEUTROPHILS NFR BLD: 66.3 % (ref 44–72)
NRBC # BLD AUTO: 0 K/UL
NRBC BLD-RTO: 0 /100 WBC
PHOSPHATE SERPL-MCNC: 2.9 MG/DL (ref 2.5–4.5)
PLATELET # BLD AUTO: 223 K/UL (ref 164–446)
PMV BLD AUTO: 11.2 FL (ref 9–12.9)
PTH-INTACT SERPL-MCNC: 41.9 PG/ML (ref 14–72)
RBC # BLD AUTO: 5.03 M/UL (ref 4.2–5.4)
TSH SERPL DL<=0.005 MIU/L-ACNC: 0.92 UIU/ML (ref 0.38–5.33)
VIT B12 SERPL-MCNC: 894 PG/ML (ref 211–911)
WBC # BLD AUTO: 5.1 K/UL (ref 4.8–10.8)

## 2021-07-28 PROCEDURE — 80053 COMPREHEN METABOLIC PANEL: CPT

## 2021-07-28 PROCEDURE — 84443 ASSAY THYROID STIM HORMONE: CPT

## 2021-07-28 PROCEDURE — 82306 VITAMIN D 25 HYDROXY: CPT

## 2021-07-28 PROCEDURE — 83970 ASSAY OF PARATHORMONE: CPT

## 2021-07-28 PROCEDURE — 84100 ASSAY OF PHOSPHORUS: CPT

## 2021-07-28 PROCEDURE — 82607 VITAMIN B-12: CPT

## 2021-07-28 PROCEDURE — 80061 LIPID PANEL: CPT

## 2021-07-28 PROCEDURE — 85025 COMPLETE CBC W/AUTO DIFF WBC: CPT

## 2021-07-29 ENCOUNTER — HOSPITAL ENCOUNTER (OUTPATIENT)
Facility: MEDICAL CENTER | Age: 81
End: 2021-07-29
Attending: FAMILY MEDICINE
Payer: MEDICARE

## 2021-07-29 LAB
ALBUMIN SERPL BCP-MCNC: 4.4 G/DL (ref 3.2–4.9)
ALBUMIN/GLOB SERPL: 2.1 G/DL
ALP SERPL-CCNC: 55 U/L (ref 30–99)
ALT SERPL-CCNC: 13 U/L (ref 2–50)
ANION GAP SERPL CALC-SCNC: 11 MMOL/L (ref 7–16)
AST SERPL-CCNC: 18 U/L (ref 12–45)
BILIRUB SERPL-MCNC: 0.3 MG/DL (ref 0.1–1.5)
BUN SERPL-MCNC: 24 MG/DL (ref 8–22)
CALCIUM SERPL-MCNC: 9 MG/DL (ref 8.5–10.5)
CHLORIDE SERPL-SCNC: 105 MMOL/L (ref 96–112)
CHOLEST SERPL-MCNC: 185 MG/DL (ref 100–199)
CO2 SERPL-SCNC: 25 MMOL/L (ref 20–33)
CREAT SERPL-MCNC: 1.03 MG/DL (ref 0.5–1.4)
CREAT UR-MCNC: 53.7 MG/DL
FASTING STATUS PATIENT QL REPORTED: NORMAL
GLOBULIN SER CALC-MCNC: 2.1 G/DL (ref 1.9–3.5)
GLUCOSE SERPL-MCNC: 95 MG/DL (ref 65–99)
HDLC SERPL-MCNC: 60 MG/DL
LDLC SERPL CALC-MCNC: 110 MG/DL
MICROALBUMIN UR-MCNC: <1.2 MG/DL
MICROALBUMIN/CREAT UR: NORMAL MG/G (ref 0–30)
POTASSIUM SERPL-SCNC: 4.6 MMOL/L (ref 3.6–5.5)
PROT SERPL-MCNC: 6.5 G/DL (ref 6–8.2)
SODIUM SERPL-SCNC: 141 MMOL/L (ref 135–145)
TRIGL SERPL-MCNC: 74 MG/DL (ref 0–149)

## 2021-07-29 PROCEDURE — 82043 UR ALBUMIN QUANTITATIVE: CPT

## 2021-07-29 PROCEDURE — 82570 ASSAY OF URINE CREATININE: CPT

## 2021-08-16 ENCOUNTER — HOSPITAL ENCOUNTER (OUTPATIENT)
Dept: CARDIOLOGY | Facility: MEDICAL CENTER | Age: 81
End: 2021-08-16
Attending: FAMILY MEDICINE
Payer: MEDICARE

## 2021-08-16 DIAGNOSIS — R01.1 HEART MURMUR: ICD-10-CM

## 2021-08-16 LAB
LV EJECT FRACT  99904: 65
LV EJECT FRACT MOD 2C 99903: 69.39
LV EJECT FRACT MOD 4C 99902: 61.55
LV EJECT FRACT MOD BP 99901: 64.37

## 2021-08-16 PROCEDURE — 93306 TTE W/DOPPLER COMPLETE: CPT | Mod: 26 | Performed by: INTERNAL MEDICINE

## 2021-08-16 PROCEDURE — 93306 TTE W/DOPPLER COMPLETE: CPT

## 2021-08-28 ENCOUNTER — PATIENT MESSAGE (OUTPATIENT)
Dept: HEALTH INFORMATION MANAGEMENT | Facility: OTHER | Age: 81
End: 2021-08-28

## 2021-12-03 ENCOUNTER — OFFICE VISIT (OUTPATIENT)
Dept: MEDICAL GROUP | Facility: PHYSICIAN GROUP | Age: 81
End: 2021-12-03
Payer: MEDICARE

## 2021-12-03 VITALS
SYSTOLIC BLOOD PRESSURE: 126 MMHG | TEMPERATURE: 98.7 F | HEIGHT: 69 IN | OXYGEN SATURATION: 98 % | RESPIRATION RATE: 16 BRPM | DIASTOLIC BLOOD PRESSURE: 60 MMHG | BODY MASS INDEX: 23.73 KG/M2 | WEIGHT: 160.2 LBS | HEART RATE: 58 BPM

## 2021-12-03 DIAGNOSIS — E78.00 PURE HYPERCHOLESTEROLEMIA: ICD-10-CM

## 2021-12-03 DIAGNOSIS — E03.9 ACQUIRED HYPOTHYROIDISM: ICD-10-CM

## 2021-12-03 DIAGNOSIS — F33.41 RECURRENT MAJOR DEPRESSIVE DISORDER, IN PARTIAL REMISSION (HCC): ICD-10-CM

## 2021-12-03 DIAGNOSIS — N18.31 STAGE 3A CHRONIC KIDNEY DISEASE: ICD-10-CM

## 2021-12-03 DIAGNOSIS — R32 URINARY INCONTINENCE, UNSPECIFIED TYPE: ICD-10-CM

## 2021-12-03 DIAGNOSIS — Z00.00 ENCOUNTER FOR MEDICARE ANNUAL WELLNESS EXAM: Primary | ICD-10-CM

## 2021-12-03 DIAGNOSIS — G31.84 MILD COGNITIVE IMPAIRMENT: ICD-10-CM

## 2021-12-03 PROCEDURE — G0439 PPPS, SUBSEQ VISIT: HCPCS | Performed by: FAMILY MEDICINE

## 2021-12-03 ASSESSMENT — ACTIVITIES OF DAILY LIVING (ADL): BATHING_REQUIRES_ASSISTANCE: 0

## 2021-12-03 ASSESSMENT — FIBROSIS 4 INDEX: FIB4 SCORE: 1.84

## 2021-12-03 ASSESSMENT — PATIENT HEALTH QUESTIONNAIRE - PHQ9
SUM OF ALL RESPONSES TO PHQ QUESTIONS 1-9: 8
CLINICAL INTERPRETATION OF PHQ2 SCORE: 4
5. POOR APPETITE OR OVEREATING: 0 - NOT AT ALL

## 2021-12-03 ASSESSMENT — ENCOUNTER SYMPTOMS: GENERAL WELL-BEING: EXCELLENT

## 2021-12-03 NOTE — PROGRESS NOTES
Chief Complaint   Patient presents with   • Medicare Annual Wellness       HPI:  Bernie is a 81 y.o. here for Medicare Annual Wellness Visit      Patient Active Problem List    Diagnosis Date Noted   • Other fatigue 04/27/2018   • Sciatica of left side 09/14/2017   • Heart murmur 09/14/2017   • Seasonal allergic rhinitis due to pollen 06/02/2017   • Pure hypercholesterolemia 06/02/2017   • Recurrent major depressive disorder, in partial remission ([facility]) 09/15/2016   • Acquired hypothyroidism 11/30/2015   • Stage 3 chronic kidney disease (HCC) 11/30/2015   • DJD (degenerative joint disease)        Current Outpatient Medications   Medication Sig Dispense Refill   • sertraline (ZOLOFT) 100 MG Tab Take 1 Tablet by mouth every day. 90 Tablet 1   • EUTHYROX 100 MCG Tab Take 1 tablet by mouth once daily 90 Tab 3   • Collagen 500 MG Cap Take 500 mg by mouth every day.     • Lactobacillus (PROBIOTIC ACIDOPHILUS PO) Take 1 Cap by mouth every day.     • Multiple Vitamin (MULTI VITAMIN DAILY PO) Take 1 Tab by mouth every day.     • Omega-3 Fatty Acids (OMEGA 3 PO) Take 1 Cap by mouth every day.     • vitamin D (CHOLECALCIFEROL) 1000 UNIT Tab Take 1,000 Units by mouth every day.     • BIOTIN 5000 PO Take 1 Tab by mouth every day.     • Magnesium 250 MG Tab Take 1 Tab by mouth every day.     • Potassium 99 MG Tab Take 1 Tab by mouth every day.     • Glucosamine HCl (GLUCOSAMINE PO) Take 1 Tab by mouth every day.       No current facility-administered medications for this visit.        Patient is taking medications as noted in medication list.  Current supplements as per medication list.     Allergies: Naprosyn [naproxen] and Pcn [penicillins]    Current social contact/activities: lunch every Wednesday with friends, cocktails with neighbors    Is patient current with immunizations? Yes.    She  reports that she quit smoking about 41 years ago. Her smoking use included cigarettes. She has a 8.00 pack-year smoking history. She has  never used smokeless tobacco. She reports current alcohol use of about 1.2 oz of alcohol per week. She reports that she does not use drugs.  Counseling given: Yes      DPA/Advanced directive: Patient has Advanced Directive on file.     ROS:    Gait: Uses no assistive device   Ostomy: No   Other tubes: No   Amputations: No   Chronic oxygen use No   Last eye exam 2 weeks ago   Wears hearing aids: No   : Reports urinary leakage during the last 6 months that has not interfered at all with their daily activities or sleep.    Screening:    Depression Screening  Little interest or pleasure in doing things?  3 - nearly every day  Feeling down, depressed, or hopeless? 1 - several days  Trouble falling or staying asleep, or sleeping too much?  2 - more than half the days  Feeling tired or having little energy?  0 - not at all  Poor appetite or overeating?  0 - not at all  Feeling bad about yourself - or that you are a failure or have let yourself or your family down? 0 - not at all  Trouble concentrating on things, such as reading the newspaper or watching television? 2 - more than half the days  Moving or speaking so slowly that other people could have noticed.  Or the opposite - being so fidgety or restless that you have been moving around a lot more than usual?  0 - not at all  Thoughts that you would be better off dead, or of hurting yourself?  0 - not at all  Patient Health Questionnaire Score: 8    If depressive symptoms identified deferred to follow up visit unless specifically addressed in assessment and plan.    Interpretation of PHQ-9 Total Score   Score Severity   1-4 No Depression   5-9 Mild Depression   10-14 Moderate Depression   15-19 Moderately Severe Depression   20-27 Severe Depression      Screening for Cognitive Impairment  Three Minute Recall (captain, claude, picture)  1/3    Draw clock face with all 12 numbers and set the hands to show 5 past 8.  No    If cognitive concerns identified, deferred for  follow up unless specifically addressed in assessment and plan.    Fall Risk Assessment  Has the patient had two or more falls in the last year or any fall with injury in the last year?  No  If fall risk identified, deferred for follow up unless specifically addressed in assessment and plan.    Safety Assessment  Throw rugs on floor.  Yes  Handrails on all stairs.  No - no stairs  Good lighting in all hallways.  Yes  Difficulty hearing.  No  Patient counseled about all safety risks that were identified.    Functional Assessment ADLs  Are there any barriers preventing you from cooking for yourself or meeting nutritional needs?  No.    Are there any barriers preventing you from driving safely or obtaining transportation?  No.    Are there any barriers preventing you from using a telephone or calling for help?  No.    Are there any barriers preventing you from shopping?  No.    Are there any barriers preventing you from taking care of your own finances?  No.    Are there any barriers preventing you from managing your medications?    No.    Are there any barriers preventing you from showering, bathing or dressing yourself?  No.    Are you currently engaging in any exercise or physical activity?  No.     What is your perception of your health?  Excellent.    Health Maintenance Summary          BONE DENSITY (Every 5 Years) Tentatively due on 2/21/2022 02/21/2017  DS-BONE DENSITY STUDY (DEXA)    02/17/2011  DS-BONE DENSITY STUDY (DEXA)          Annual Wellness Visit (Every 366 Days) Next due on 12/4/2022 12/03/2021  Visit Dx: Encounter for Medicare annual wellness exam    07/08/2020  Visit Dx: Medicare annual wellness visit, subsequent    04/16/2018  Visit Dx: Medicare annual wellness visit, subsequent    10/17/2017  Visit Dx: Medicare annual wellness visit, subsequent    09/15/2016  Visit Dx: Medicare annual wellness visit, subsequent    Only the first 5 history entries have been loaded, but more history exists.           COLORECTAL CANCER SCREENING (COLONOSCOPY - Every 10 Years) Next due on 9/11/2024 09/11/2014  COLONOSCOPY (Reason not specified)    08/15/2013  Surgical Procedure: COLONOSCOPY WITH POLYP    07/02/2013  COLONOSCOPY (Reason not specified)          IMM DTaP/Tdap/Td Vaccine (2 - Td or Tdap) Next due on 12/4/2024 12/04/2014  Imm Admin: Tdap Vaccine          IMM PNEUMOCOCCAL VACCINE: 65+ Years (Series Information) Completed    09/15/2016  Imm Admin: Pneumococcal Conjugate Vaccine (Prevnar/PCV-13)    10/22/2014  Imm Admin: Pneumococcal polysaccharide vaccine (PPSV-23)    11/21/2007  Imm Admin: Pneumococcal Vaccine (UF) - HISTORICAL DATA          IMM ZOSTER VACCINES (Series Information) Completed    01/07/2021  Imm Admin: Zoster Vaccine Recombinant (RZV) (SHINGRIX)    10/05/2020  Imm Admin: Zoster Vaccine Recombinant (RZV) (SHINGRIX)          IMM INFLUENZA (Series Information) Completed    10/14/2021  Imm Admin: Influenza Vaccine Adult HD    09/17/2020  Imm Admin: Influenza Vaccine Adult HD    10/22/2019  Imm Admin: Influenza Vaccine Adult HD    09/10/2018  Imm Admin: Influenza Vaccine Adult HD    09/14/2017  Imm Admin: Influenza Vaccine Adult HD    Only the first 5 history entries have been loaded, but more history exists.          COVID-19 Vaccine (Series Information) Completed    10/14/2021  Imm Admin: Pfizer SARS-CoV-2 Vaccine 12+    02/12/2021  Imm Admin: Pfizer SARS-CoV-2 Vaccine    01/22/2021  Imm Admin: Pfizer SARS-CoV-2 Vaccine          IMM MENINGOCOCCAL VACCINE (MCV4) (Series Information) Aged Out    No completion history exists for this topic.          Discontinued - MAMMOGRAM  Discontinued    02/21/2017  MA-MAMMO SCREENING BILAT W/ALESSANDRO W/CAD    12/27/2012  MA-SCREENING MAMMOGRAM W/ CAD    01/04/2011  MA-SCREEING MAMMOGRAM W/ CAD    11/06/2009  MA-SCREENING DIGITAL MAMMO    11/06/2009  MA-CAD SCREENING-MAMMO    Only the first 5 history entries have been loaded, but more history exists.                 Patient Care Team:  Kendra Diaz M.D. as PCP - General (Family Medicine)  Clem Harrison M.D. as Consulting Physician (Ophthalmology)  Javier Gould M.D. (Gastroenterology)  Ted Johnson Ass't as      Social History     Tobacco Use   • Smoking status: Former Smoker     Packs/day: 1.00     Years: 8.00     Pack years: 8.00     Types: Cigarettes     Quit date: 1980     Years since quittin.9   • Smokeless tobacco: Never Used   Vaping Use   • Vaping Use: Never used   Substance Use Topics   • Alcohol use: Yes     Alcohol/week: 1.2 oz     Types: 2 Glasses of wine per week   • Drug use: No     Family History   Problem Relation Age of Onset   • Cancer Mother         colon   • Diabetes Mother    • Thyroid Sister    • No Known Problems Father         Passed away before the pt was born    • Diabetes Other    • Heart Disease Neg Hx    • Hypertension Neg Hx    • Hyperlipidemia Neg Hx      She  has a past medical history of Anesthesia, Arthritis (), Colon polyp, Colon polyps, Dental disorder, DJD (degenerative joint disease), Heart valve disease (), Hypothyroidism, Major depression (2013), Migraine, and Postmenopausal hormone replacement therapy. She also has no past medical history of Backpain, Breast cancer (HCC), COPD, or Fall.   Past Surgical History:   Procedure Laterality Date   • COLONOSCOPY WITH POLYP  8/15/2013    Performed by J[facility] Gould M.D. at [facility]   • HAND SURGERY      right index finger joint reconstruction   • HAND SURGERY      bilateral thumb reconstruction   • ABDOMINAL HYSTERECTOMY TOTAL      benign   • APPENDECTOMY     • CERVICAL FUSION POSTERIOR      C4,5,6,7 neck fusion   • HIP ARTHROPLASTY TOTAL Bilateral    • TONSILLECTOMY  as child     Exam:   /60 (BP Location: Left arm, Patient Position: Sitting, BP Cuff Size: Adult)   Pulse (!) 58   Temp 37.1 °C (98.7 °F) (Temporal)   Resp 16   Ht 1.753 m (5'  "9\")   Wt 72.7 kg (160 lb 3.2 oz)   SpO2 98%  Body mass index is 23.66 kg/m².    Hearing good.    Dentition fair  Alert, oriented in no acute distress.  Eye contact is good, speech goal directed, affect calm    Assessment and Plan. The following treatment and monitoring plan is recommended:      1. Encounter for Medicare annual wellness exam  Bernie a pleasant 81-year-old woman here today for Medicare wellness visit.  Health maintenance is up-to-date.    2. Mild cognitive impairment  Chronic, progressing.  She can only recall 1 out of 3 words today and last year she could recall 2.  Her clock was also not correct.  She denies symptoms at home but then later in the visit says she is worried about her memory so at her next visit we will do more formal memory testing.    3. Urinary incontinence, unspecified type  Chronic, stable.  She has for quite some time she is had a slow leakage that is very tolerable when she wears pads.  We will continue to monitor for now.    4. Recurrent major depressive disorder, in partial remission (HCC)  Chronic, uncontrolled.  She reports that her depression has been getting worse over the last year and PHQ-9 does show mild depression.  She is to be on 200 mg of sertraline she would like to go back.  She does not see a therapist and is not interested in seeing a therapist at this time.  -Increase sertraline to 200 mg daily    5. Stage 3a chronic kidney disease (HCC)  Chronic, stable. Up to date with labs and will recheck labs in 7/2022.    6. Acquired hypothyroidism  This is a chronic condition, controlled.  She is on levothyroxine and tolerating it well. Last TSH in July, 2021 was within normal limits, so we will continue the current dosing.  -Levothyroxine 100 mcg daily    7. Pure hypercholesterolemia  Chronic, stable.  Labs in July, 2021 showed elevated LDL.  We cannot calculate an ASCVD risk so we will monitor for now.    Services suggested: No services needed at this time  Health Care " Screening recommendations as per orders if indicated.  Referrals offered: PT/OT/Nutrition counseling/Behavioral Health/Smoking cessation as per orders if indicated.    Discussion today about general wellness and lifestyle habits:    · Prevent falls and reduce trip hazards; Cautioned about securing or removing rugs.  · Have a working fire alarm and carbon monoxide detector;   · Engage in regular physical activity and social activities     Follow-up: Return in about 4 weeks (around 12/31/2021) for f/u depression, get PHQ9, memory testing.

## 2021-12-03 NOTE — PATIENT INSTRUCTIONS
For your depression:  - take 2 tablets of the sertraline every day    Today, your Healthcare Provider may have discussed the following recommendations:    1. Exercise and Physical Activity  According to the American Heart Association, it is recommended to engage in physical activity regularly and to aim for 150 minutes of moderate-intensity aerobic activity per week.  Your Healthcare Provider may have recommended taking the stairs instead of the elevator, starting or maintaining a walking program or strength-training program.    2. Emotional Well-being  Mental and emotional well-being is essential to overall health.  Your Healthcare Provider may have encouraged you to build strong, positive relationships with family and friends, become more involved in your community (by volunteering or joining a spiritual community), or focus on self-care.    3. Fall and Injury Prevention  To prevent falls and injuries and also improve your balance, your Healthcare Provider may have suggested that you use a cane or walker, start an exercise of physical therapy program, or have your vision and/or hearing tested.    4. Urinary Leakage (Urinary Incontinence)  To control or manage the leakage of urine, your Healthcare Provider may have recommended you start bladder training exercises (such as Kegel exercises), a trial of a medication or a referral to see a specialist to discuss surgical options.

## 2021-12-13 ENCOUNTER — TELEPHONE (OUTPATIENT)
Dept: MEDICAL GROUP | Facility: PHYSICIAN GROUP | Age: 81
End: 2021-12-13

## 2021-12-13 RX ORDER — LEVOTHYROXINE SODIUM 0.1 MG/1
100 TABLET ORAL DAILY
Qty: 90 TABLET | Refills: 3 | Status: SHIPPED | OUTPATIENT
Start: 2021-12-13 | End: 2021-12-16 | Stop reason: SDUPTHER

## 2021-12-13 RX ORDER — LEVOTHYROXINE SODIUM 0.1 MG/1
100 TABLET ORAL DAILY
Qty: 90 TABLET | Refills: 0 | OUTPATIENT
Start: 2021-12-13

## 2021-12-13 NOTE — TELEPHONE ENCOUNTER
Pt is here to ask for a refill of her levothyroxine 100 mcg. The pharmacist gave her 3 pills today till she can get her refill. I looked and it shows it was discontinued so I told pt she might have to do labs or come in for an appt. Please call pt with any questions

## 2021-12-14 RX ORDER — LEVOTHYROXINE SODIUM 0.1 MG/1
100 TABLET ORAL DAILY
Qty: 90 TABLET | Refills: 3 | OUTPATIENT
Start: 2021-12-14

## 2021-12-16 RX ORDER — LEVOTHYROXINE SODIUM 0.1 MG/1
100 TABLET ORAL DAILY
Qty: 90 TABLET | Refills: 2 | Status: SHIPPED | OUTPATIENT
Start: 2021-12-16 | End: 2023-01-10 | Stop reason: SDUPTHER

## 2022-01-06 ENCOUNTER — OFFICE VISIT (OUTPATIENT)
Dept: MEDICAL GROUP | Facility: PHYSICIAN GROUP | Age: 82
End: 2022-01-06
Payer: MEDICARE

## 2022-01-06 VITALS
OXYGEN SATURATION: 96 % | SYSTOLIC BLOOD PRESSURE: 158 MMHG | HEART RATE: 60 BPM | TEMPERATURE: 98.6 F | WEIGHT: 161 LBS | BODY MASS INDEX: 23.85 KG/M2 | DIASTOLIC BLOOD PRESSURE: 76 MMHG | HEIGHT: 69 IN

## 2022-01-06 DIAGNOSIS — G31.84 MCI (MILD COGNITIVE IMPAIRMENT) WITH MEMORY LOSS: ICD-10-CM

## 2022-01-06 DIAGNOSIS — F33.41 RECURRENT MAJOR DEPRESSIVE DISORDER, IN PARTIAL REMISSION (HCC): Primary | ICD-10-CM

## 2022-01-06 DIAGNOSIS — Z83.49 FAMILY HISTORY OF B12 DEFICIENCY: ICD-10-CM

## 2022-01-06 PROCEDURE — 99214 OFFICE O/P EST MOD 30 MIN: CPT | Performed by: FAMILY MEDICINE

## 2022-01-06 RX ORDER — SERTRALINE HYDROCHLORIDE 100 MG/1
200 TABLET, FILM COATED ORAL DAILY
Qty: 180 TABLET | Refills: 3 | Status: SHIPPED | OUTPATIENT
Start: 2022-01-06 | End: 2024-03-11

## 2022-01-06 ASSESSMENT — PATIENT HEALTH QUESTIONNAIRE - PHQ9
CLINICAL INTERPRETATION OF PHQ2 SCORE: 2
5. POOR APPETITE OR OVEREATING: 0 - NOT AT ALL
SUM OF ALL RESPONSES TO PHQ QUESTIONS 1-9: 4

## 2022-01-06 ASSESSMENT — FIBROSIS 4 INDEX: FIB4 SCORE: 1.84

## 2022-01-06 ASSESSMENT — ENCOUNTER SYMPTOMS
CHILLS: 0
FEVER: 0
SHORTNESS OF BREATH: 0

## 2022-01-06 NOTE — ASSESSMENT & PLAN NOTE
Chronic, controlled. Since we increased her sertraline, her PHQ9 score has improved 50% and she agrees she sees the same improvement. Some sad days still on occasion, but overall much better. We will continue with the current dosing.  -sertraline 200 mg daily

## 2022-01-06 NOTE — ASSESSMENT & PLAN NOTE
Chronic. Noted to be progressing with our memory screen at her Medicare AW and she is noticing memory issues at home. In office MOCA scored 19/30 indicating memory loss. After discussion, she is interested seeing the memory clinic to discuss options.

## 2022-01-06 NOTE — PROGRESS NOTES
Subjective:     CC: f/u depression, memory testing    HPI:   Bernie presents today with    Problem   Mild Cognitive Impairment    Noted to be progressing at her last wellness visit. Incorrect clock and could only recall 1 of 3 words. Here today for more formal memory testing. No family history of dementia that she is aware of.      Recurrent Major Depressive Disorder, in Partial Remission (Hcc)    This is a chronic condition.  Onset: 4-5 years (after  passed)  Duration: intermittent  PHQ9: 8 (12/2021)  Suicidal ideation/homicidal ideation: no/no  Therapy/counseling: no  Medications: sertraline 200 mg daily  Improving/worsening: only improved sometimes    Depression Screening    Little interest or pleasure in doing things?  1 - several days   Feeling down, depressed , or hopeless? 1 - several days   Trouble falling or staying asleep, or sleeping too much?  1 - several days   Feeling tired or having little energy?  1 - several days   Poor appetite or overeating?  0 - not at all   Feeling bad about yourself - or that you are a failure or have let yourself or your family down? 0 - not at all   Trouble concentrating on things, such as reading the newspaper or watching television? 0 - not at all   Moving or speaking so slowly that other people could have noticed.  Or the opposite - being so fidgety or restless that you have been moving around a lot more than usual?  0 - not at all   Thoughts that you would be better off dead, or of hurting yourself?  0 - not at all   Patient Health Questionnaire Score: 4       If depressive symptoms identified deferred to follow up visit unless specifically addressed in assesment and plan.    Interpretation of PHQ-9 Total Score   Score Severity   1-4 No Depression   5-9 Mild Depression   10-14 Moderate Depression   15-19 Moderately Severe Depression   20-27 Severe Depression         Health Maintenance: Completed    ROS:  Review of Systems   Constitutional: Negative for chills and  "fever.   Respiratory: Negative for shortness of breath.    Cardiovascular: Negative for chest pain.       Objective:     Exam:  /76 (BP Location: Right arm, Patient Position: Sitting, BP Cuff Size: Adult)   Pulse 60   Temp 37 °C (98.6 °F) (Temporal)   Ht 1.753 m (5' 9\")   Wt 73 kg (161 lb)   SpO2 96%   BMI 23.78 kg/m²  Body mass index is 23.78 kg/m².    Physical Exam  Constitutional:       Appearance: Normal appearance.   Cardiovascular:      Rate and Rhythm: Normal rate and regular rhythm.      Heart sounds: Normal heart sounds.   Pulmonary:      Effort: Pulmonary effort is normal.      Breath sounds: Normal breath sounds.   Musculoskeletal:      Cervical back: Normal range of motion and neck supple.   Neurological:      Mental Status: She is alert.         Assessment & Plan:     81 y.o. female with the following -     Problem List Items Addressed This Visit     Recurrent major depressive disorder, in partial remission (HCC)     Chronic, controlled. Since we increased her sertraline, her PHQ9 score has improved 50% and she agrees she sees the same improvement. Some sad days still on occasion, but overall much better. We will continue with the current dosing.  -sertraline 200 mg daily         Relevant Medications    sertraline (ZOLOFT) 100 MG Tab    Mild cognitive impairment     Chronic. Noted to be progressing with our memory screen at her Medicare [facility] and she is noticing memory issues at home. In office MOCA scored 19/30 indicating memory loss. After discussion, she is interested seeing the memory clinic to discuss options.           Other Visit Diagnoses     Family history of B12 deficiency        Relevant Orders    VITAMIN B12        Return in about 6 months (around 7/6/2022) for Med check.    Please note that this dictation was created using voice recognition software. I have made every reasonable attempt to correct obvious errors, but I expect that there are errors of grammar and possibly content " that I did not discover before finalizing the note.

## 2022-01-20 ENCOUNTER — HOSPITAL ENCOUNTER (OUTPATIENT)
Dept: LAB | Facility: MEDICAL CENTER | Age: 82
End: 2022-01-20
Attending: FAMILY MEDICINE
Payer: MEDICARE

## 2022-01-20 DIAGNOSIS — Z83.49 FAMILY HISTORY OF B12 DEFICIENCY: ICD-10-CM

## 2022-01-20 LAB — VIT B12 SERPL-MCNC: 762 PG/ML (ref 211–911)

## 2022-01-20 PROCEDURE — 82607 VITAMIN B-12: CPT

## 2022-01-20 PROCEDURE — 36415 COLL VENOUS BLD VENIPUNCTURE: CPT

## 2022-01-31 ENCOUNTER — PATIENT MESSAGE (OUTPATIENT)
Dept: HEALTH INFORMATION MANAGEMENT | Facility: OTHER | Age: 82
End: 2022-01-31
Payer: MEDICARE

## 2022-05-03 ENCOUNTER — HOSPITAL ENCOUNTER (OUTPATIENT)
Dept: LAB | Facility: MEDICAL CENTER | Age: 82
End: 2022-05-03
Attending: PSYCHIATRY & NEUROLOGY
Payer: MEDICARE

## 2022-05-03 ENCOUNTER — OFFICE VISIT (OUTPATIENT)
Dept: NEUROLOGY | Facility: MEDICAL CENTER | Age: 82
End: 2022-05-03
Attending: PSYCHIATRY & NEUROLOGY
Payer: MEDICARE

## 2022-05-03 VITALS
HEART RATE: 73 BPM | DIASTOLIC BLOOD PRESSURE: 88 MMHG | SYSTOLIC BLOOD PRESSURE: 128 MMHG | OXYGEN SATURATION: 98 % | WEIGHT: 156.53 LBS | TEMPERATURE: 98.1 F | BODY MASS INDEX: 23.18 KG/M2 | HEIGHT: 69 IN | RESPIRATION RATE: 16 BRPM

## 2022-05-03 DIAGNOSIS — G31.89 OTHER SPECIFIED DEGENERATIVE DISEASES OF NERVOUS SYSTEM (HCC): ICD-10-CM

## 2022-05-03 DIAGNOSIS — G31.84 AMNESTIC MCI (MILD COGNITIVE IMPAIRMENT WITH MEMORY LOSS): ICD-10-CM

## 2022-05-03 LAB
ANION GAP SERPL CALC-SCNC: 13 MMOL/L (ref 7–16)
BUN SERPL-MCNC: 19 MG/DL (ref 8–22)
CALCIUM SERPL-MCNC: 9.6 MG/DL (ref 8.5–10.5)
CHLORIDE SERPL-SCNC: 108 MMOL/L (ref 96–112)
CO2 SERPL-SCNC: 23 MMOL/L (ref 20–33)
CREAT SERPL-MCNC: 1.14 MG/DL (ref 0.5–1.4)
FOLATE SERPL-MCNC: 19.1 NG/ML
GFR SERPLBLD CREATININE-BSD FMLA CKD-EPI: 48 ML/MIN/1.73 M 2
GLUCOSE SERPL-MCNC: 86 MG/DL (ref 65–99)
POTASSIUM SERPL-SCNC: 4.8 MMOL/L (ref 3.6–5.5)
SODIUM SERPL-SCNC: 144 MMOL/L (ref 135–145)
TSH SERPL DL<=0.005 MIU/L-ACNC: 1.7 UIU/ML (ref 0.38–5.33)

## 2022-05-03 PROCEDURE — 36415 COLL VENOUS BLD VENIPUNCTURE: CPT

## 2022-05-03 PROCEDURE — 99203 OFFICE O/P NEW LOW 30 MIN: CPT | Performed by: PSYCHIATRY & NEUROLOGY

## 2022-05-03 PROCEDURE — 84425 ASSAY OF VITAMIN B-1: CPT

## 2022-05-03 PROCEDURE — 99212 OFFICE O/P EST SF 10 MIN: CPT | Performed by: PSYCHIATRY & NEUROLOGY

## 2022-05-03 PROCEDURE — 84443 ASSAY THYROID STIM HORMONE: CPT

## 2022-05-03 PROCEDURE — 80048 BASIC METABOLIC PNL TOTAL CA: CPT

## 2022-05-03 PROCEDURE — 82746 ASSAY OF FOLIC ACID SERUM: CPT

## 2022-05-03 ASSESSMENT — ANXIETY QUESTIONNAIRES
3. WORRYING TOO MUCH ABOUT DIFFERENT THINGS: SEVERAL DAYS
IF YOU CHECKED OFF ANY PROBLEMS ON THIS QUESTIONNAIRE, HOW DIFFICULT HAVE THESE PROBLEMS MADE IT FOR YOU TO DO YOUR WORK, TAKE CARE OF THINGS AT HOME, OR GET ALONG WITH OTHER PEOPLE: NOT DIFFICULT AT ALL
4. TROUBLE RELAXING: NOT AT ALL
2. NOT BEING ABLE TO STOP OR CONTROL WORRYING: NOT AT ALL
7. FEELING AFRAID AS IF SOMETHING AWFUL MIGHT HAPPEN: NOT AT ALL
6. BECOMING EASILY ANNOYED OR IRRITABLE: NOT AT ALL
GAD7 TOTAL SCORE: 2
1. FEELING NERVOUS, ANXIOUS, OR ON EDGE: SEVERAL DAYS
5. BEING SO RESTLESS THAT IT IS HARD TO SIT STILL: NOT AT ALL

## 2022-05-03 ASSESSMENT — PAIN SCALES - GENERAL: PAINLEVEL: NO PAIN

## 2022-05-03 ASSESSMENT — PATIENT HEALTH QUESTIONNAIRE - PHQ9: CLINICAL INTERPRETATION OF PHQ2 SCORE: 0

## 2022-05-03 ASSESSMENT — FIBROSIS 4 INDEX: FIB4 SCORE: 1.84

## 2022-05-03 NOTE — PROGRESS NOTES
"Reason for Neurology Consult:  Chronic memory disturbances    History of present illness:    Bernie Barrientos 81 y.o. right handed woman who lives in [facility] for nearly 15 years. She is originally from [place]. Her step daughter has lived with Bernie for nearly 5 years. She was a  and a  in [place].    Bernie has not noticed difficulty remembering specific information in conversation and this became noticeable to her about 3-4 years. There has been no notice of repeating information to another person in her view nor has Bernie been told (or recalls) that her step daughter states things as \"you just said that or you just me that.\"     Bernie does not feel that the memory disturbance(s) interfere with daily functioning. She is confident in remembering the month/year and even the day of the week (over the last 6 months or longer) and does not feel she needs to rely on the calendar all the time.    She very infrequently misplaces a person item in the last 6 month.    Communication ability has been well maintained without clear word retrieval issues in the last 6-12 months.    No history of concussion(s),seizure(s) or seizure like events nor any documented events in the Problem List to suggest such an issue.    There is no history of significant or known stroke events in her adult life.    No significant alcohol use in adult life.  Very remote smoker over 40 years ago (less than 10 years)> 1/4 pack per day.    Denies postural dizziness/faintness episodes in the recent months.    Denies any headache(s),visual disturbances of any sort or kind, sensory changes of the limbs or body nor any involuntary movements of the limb or body in the last 3-6 months.    ADL(s) preserved in her view including not having any accidents/incidents when driving in the last 12 months or longer nor any problems with making meals for self, shopping,cleaning her house, using her electronic devices, using her computer to create " and send emails and she likes [employer] nearly every day (1-2 days).    Average sleep- 7-8 hours most nights per week most nights; occasionally will awaken at night and will urinate. Denies snoring,grunting,gasping or self arousals nor REM Sleep Behavioral issues.     Mood has been stable and rarely has she felt depressed nor has she had any formal psychiatric evaluations for mood type issues.        Patient Active Problem List    Diagnosis Date Noted   • Mild cognitive impairment 01/06/2022   • Other fatigue 04/27/2018   • Sciatica of left side 09/14/2017   • Heart murmur 09/14/2017   • Seasonal allergic rhinitis due to pollen 06/02/2017   • Pure hypercholesterolemia 06/02/2017   • Recurrent major depressive disorder, in partial remission (HCC) 09/15/2016   • Acquired hypothyroidism 11/30/2015   • Stage 3 chronic kidney disease (HCC) 11/30/2015   • DJD (degenerative joint disease)        Past medical history:   Past Medical History:   Diagnosis Date   • Anesthesia     PONV   • Arthritis 2003    bilat hip and neck surg related to arthritis, bilateral thumbs surgery do to arthritis   • Colon polyp    • Colon polyps    • Dental disorder     upper partial dentures   • DJD (degenerative joint disease)    • Heart valve disease 2005    mitral valve prolapse   • Hypothyroidism    • Major depression 5/2/2013   • Migraine    • Postmenopausal hormone replacement therapy        Past surgical history:   Past Surgical History:   Procedure Laterality Date   • COLONOSCOPY WITH POLYP  8/15/2013    Performed by Javier Gould M.D. at [facility]   • HAND SURGERY  2009    right index finger joint reconstruction   • HAND SURGERY  2004    bilateral thumb reconstruction   • ABDOMINAL HYSTERECTOMY TOTAL  1984    benign   • APPENDECTOMY  1957   • CERVICAL FUSION POSTERIOR      C4,5,6,7 neck fusion   • HIP ARTHROPLASTY TOTAL Bilateral    • TONSILLECTOMY  as child         Social history:   Social History     Socioeconomic  History   • Marital status:      Spouse name: Not on file   • Number of children: Not on file   • Years of education: Not on file   • Highest education level: Not on file   Occupational History   • Not on file   Tobacco Use   • Smoking status: Former Smoker     Packs/day: 1.00     Years: 8.00     Pack years: 8.00     Types: Cigarettes     Quit date: 1980     Years since quittin.3   • Smokeless tobacco: Never Used   Vaping Use   • Vaping Use: Never used   Substance and Sexual Activity   • Alcohol use: Not Currently     Alcohol/week: 1.2 oz     Types: 2 Glasses of wine per week   • Drug use: No   • Sexual activity: Not Currently   Other Topics Concern   • Not on file   Social History Narrative   • Not on file     Social Determinants of Health     Financial Resource Strain: Not on file   Food Insecurity: Not on file   Transportation Needs: Not on file   Physical Activity: Not on file   Stress: Not on file   Social Connections: Not on file   Intimate Partner Violence: Not on file   Housing Stability: Not on file       Family history:   Family History   Problem Relation Age of Onset   • Cancer Mother         colon   • Diabetes Mother    • Thyroid Sister    • No Known Problems Father         Passed away before the pt was born    • Diabetes Other    • Heart Disease Neg Hx    • Hypertension Neg Hx    • Hyperlipidemia Neg Hx          Current medications:   Current Outpatient Medications   Medication   • sertraline (ZOLOFT) 100 MG Tab   • levothyroxine (EUTHYROX) 100 MCG Tab   • Lactobacillus (PROBIOTIC ACIDOPHILUS PO)   • Multiple Vitamin (MULTI VITAMIN DAILY PO)   • Omega-3 Fatty Acids (OMEGA 3 PO)   • vitamin D (CHOLECALCIFEROL) 1000 UNIT Tab   • BIOTIN 5000 PO   • Magnesium 250 MG Tab   • Potassium 99 MG Tab   • Glucosamine HCl (GLUCOSAMINE PO)   • Collagen 500 MG Cap     No current facility-administered medications for this visit.       Medication Allergy:  Allergies   Allergen Reactions   • Naprosyn  "[Naproxen] Rash     Rash on body   • Pcn [Penicillins]      Reaction when she was a child            Physical examination:   Vitals:    05/03/22 1314   BP: 128/88   BP Location: Right arm   Patient Position: Sitting   BP Cuff Size: Adult   Pulse: 73   Resp: 16   Temp: 36.7 °C (98.1 °F)   TempSrc: Temporal   SpO2: 98%   Weight: 71 kg (156 lb 8.4 oz)   Height: 1.753 m (5' 9\")       Normal cephalic atraumatic.  There is full range of movement around the neck in all directions without restrictions or discrete pain evoked triggers.  No lower extremity edema.      Neurological  Exam:      Fritz Cognitive Assessment (MOCA) Version 7.1    Years of Education: High School Graduate     TOTAL SCORE: 22/30  (to be scanned into the MEDIA section in the E.M.R.)          Mental status: Awake, alert and fully oriented to person, place, time and situation. Normal attention and concentration.  Did not appear/act combative,irritable,anxious,paranoid/delusional or aggressive to or with me.    Speech and language: Speech is fluent without errors, clear, intact to repetition and intact to naming.     Follows 3 step motor commands in sequence without significant delay and correctly.    Cranial nerve exam:  II: Pupils are equally round and reactive to light. Visual fields are intact by confrontation.  III, IV, VI: EOMI, no diplopia, no ptosis.  V: Sensation to light touch is normal over V1-3 distributions bilaterally.  .  VII: Facial movements are symmetrical. There is no facial droop. .  VIII: Hearing intact to soft speech and finger rub bilaterally  IX: Palate elevates symmetrically, uvula is midline. Dysarthria is not present.  XI: Shoulder shrug are symmetrical and strong.   XII: Tongue protrudes midline.      Motor exam:  Muscle tone is normal in all 4 limbs.    Muscle strength:    Neck Flexors/Extensors: 5/5       Right  Left  Deltoid   5/5  5/5      Biceps   5/5  5/5  Triceps  5/5  5/5   Wrist extensors 5/5  5/5  Wrist " flexors  5/5  5/5     5/5  5/5  Interossei  5/5  5/5  Thenar (APB)  5/5  5/5   Hip flexors  5/5  5/5  Quadriceps  5/5  5/5    Hamstrings  5/5  5/5  Dorsiflexors  5/5  5/5  Plantarflexors  5/5  5/5  Toe extension  5/5  5/5      Sensory exam:  Intact to Vibration and Proprioception in bilaterally lower extremity.          Reflexes:       Right  Left  Biceps   2/4  2/4  Triceps  2/4  2/4  Brachioradialis 2/4  2/4  Knee jerk  2/4  2/4  Ankle jerk  2/4  2/4     Frontal release signs are absent    bilaterally toes are downgoing to plantar stimulation..    Coordination (finger-to-nose, heel/knee/shin, rapid alternating movements) was normal.     There was no ataxia, no tremors, and no dysmetria.     Station and gait were normal.     Easily stands up from exam chair without retropulsion,veering,leaning,swaying (to either side).       Labs and Tests:    Reviewed from last 6 months.    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    MOCA score abnormal- 22 today.    Based on Bernie's history and information can not confirm or conclude at this time she has a dementia.    There is no evidence for parkinsonism.    I am asking Bernie to have another visit with me with her daughter to clarify more information.    I will be ordering a Brain MRI for Bernie to evaluate the structure of her brain.      Total time spent today or this patient's care was 35 minutes   and included reviewing  the diagnostic workup to date (such as labs and imaging as well as interpreting such tests relevant to this patient's neurological condition),  reviewing/obtaining separately obtained history (from patient and/or accompanying family member)  for today's neurological problem(s) ,counseling and educating the patient on issues related to cognition/memory and cognitive health factors and documenting  the clinical information in the EMR.    Follow up with me in  3 months or so.        Jonas Deng MD  [facility].   [facility]

## 2022-05-03 NOTE — Clinical Note
Please schedule follow up visit with me in 3 months for 40 minutes. Bernie needs to bring one of her close friends (either Rossi or Jenny).  Emmanuel

## 2022-05-08 LAB — VIT B1 BLD-MCNC: 72 NMOL/L (ref 70–180)

## 2022-05-17 ENCOUNTER — TELEPHONE (OUTPATIENT)
Dept: HEALTH INFORMATION MANAGEMENT | Facility: OTHER | Age: 82
End: 2022-05-17
Payer: MEDICARE

## 2022-07-14 ENCOUNTER — E-CONSULT (OUTPATIENT)
Dept: CARDIOLOGY | Facility: MEDICAL CENTER | Age: 82
End: 2022-07-14

## 2022-07-14 ENCOUNTER — OFFICE VISIT (OUTPATIENT)
Dept: MEDICAL GROUP | Facility: PHYSICIAN GROUP | Age: 82
End: 2022-07-14
Payer: MEDICARE

## 2022-07-14 VITALS
OXYGEN SATURATION: 94 % | DIASTOLIC BLOOD PRESSURE: 68 MMHG | BODY MASS INDEX: 23.4 KG/M2 | SYSTOLIC BLOOD PRESSURE: 132 MMHG | TEMPERATURE: 98.1 F | WEIGHT: 158 LBS | HEIGHT: 69 IN | HEART RATE: 58 BPM

## 2022-07-14 DIAGNOSIS — N18.31 STAGE 3A CHRONIC KIDNEY DISEASE: ICD-10-CM

## 2022-07-14 DIAGNOSIS — I27.20 PULMONARY HYPERTENSION (HCC): ICD-10-CM

## 2022-07-14 DIAGNOSIS — Z71.9 ENCOUNTER FOR CONSULTATION: ICD-10-CM

## 2022-07-14 DIAGNOSIS — F33.41 RECURRENT MAJOR DEPRESSIVE DISORDER, IN PARTIAL REMISSION (HCC): Primary | ICD-10-CM

## 2022-07-14 PROCEDURE — 99214 OFFICE O/P EST MOD 30 MIN: CPT | Performed by: FAMILY MEDICINE

## 2022-07-14 PROCEDURE — 99452 NTRPROF PH1/NTRNET/EHR RFRL: CPT | Performed by: INTERNAL MEDICINE

## 2022-07-14 ASSESSMENT — ENCOUNTER SYMPTOMS
CHILLS: 0
SHORTNESS OF BREATH: 0
FEVER: 0

## 2022-07-14 ASSESSMENT — FIBROSIS 4 INDEX: FIB4 SCORE: 1.84

## 2022-07-14 NOTE — PATIENT INSTRUCTIONS
Today, your Healthcare Provider may have discussed the following recommendations:    1. Exercise and Physical Activity  According to the American Heart Association, it is recommended to engage in physical activity regularly and to aim for 150 minutes of moderate-intensity aerobic activity per week.  Your Healthcare Provider may have recommended taking the stairs instead of the elevator, starting or maintaining a walking program or strength-training program.    2. Emotional Well-being  Mental and emotional well-being is essential to overall health.  Your Healthcare Provider may have encouraged you to build strong, positive relationships with family and friends, become more involved in your community (by volunteering or joining a spiritual community), or focus on self-care.    3. Fall and Injury Prevention  To prevent falls and injuries and also improve your balance, your Healthcare Provider may have suggested that you use a cane or walker, start an exercise of physical therapy program, or have your vision and/or hearing tested.    4. Urinary Leakage (Urinary Incontinence)  To control or manage the leakage of urine, your Healthcare Provider may have recommended you start bladder training exercises (such as Kegel exercises), a trial of a medication or a referral to see a specialist to discuss surgical options.    5. Chronic Conditions  Your chronic conditions and any pertinent labs associated with those conditions were reviewed.    6. Medication List  Your medication list was reviewed with the medical assistant. If there were any changes, this was also discussed with your provider.    7. Health Maintenance and Preventive Care  If you had any overdue preventive care topics, these were reviewed and ordered for you.

## 2022-07-14 NOTE — PROGRESS NOTES
"Subjective:     CC: AHA     HPI:   Bernie presents today with    Problem   Recurrent Major Depressive Disorder, in Partial Remission (Hcc)    This is a chronic condition.  Onset: 4-5 years (after  passed)  Duration: intermittent  PHQ9: 0(5/2022); 8 (12/2021)  Suicidal ideation/homicidal ideation: no/no  Therapy/counseling: no  Medications: sertraline 200 mg daily  Improving/worsening: controlled     Stage 3 Chronic Kidney Disease (Hcc)    This is a chronic condition.  Onset: at least 2017  EGFR: 48 (5/2022)  Albuminuria: due  Stage: TBD  Serum albumin: due  Alk phos: 54 (7/2020) - no repeat necessary  Ca: 9.6 (5/2022) - due 11/2022  Vit D: 36 (2018) - no repeat necessary  PTH: 41.9, wnl (7/2021) - no repeat necessary  Anemia: due  Avoiding nephrotoxic agents? yes          Annual Health Assessment Questions:    1.  Are you currently engaging in any exercise or physical activity? No    2.  How would you describe your mood or emotional well-being today? good    3.  Have you had any falls in the last year? No    4.  Have you noticed any problems with your balance or had difficulty walking? No    5.  In the last six months have you experienced any leakage of urine? Yes    6. DPA/Advanced Directive: Patient has Advanced Directive on file.     Health Maintenance: Completed    ROS:  Review of Systems   Constitutional: Negative for chills and fever.   Respiratory: Negative for shortness of breath.    Cardiovascular: Negative for chest pain.       Objective:     Exam:  /68 (BP Location: Right arm, Patient Position: Sitting, BP Cuff Size: Adult)   Pulse (!) 58   Temp 36.7 °C (98.1 °F) (Temporal)   Ht 1.753 m (5' 9\")   Wt 71.7 kg (158 lb)   SpO2 94%   BMI 23.33 kg/m²  Body mass index is 23.33 kg/m².    Physical Exam  Constitutional:       Appearance: Normal appearance.   Cardiovascular:      Rate and Rhythm: Normal rate and regular rhythm.      Heart sounds: Normal heart sounds.   Pulmonary:      Effort: " Pulmonary effort is normal.      Breath sounds: Normal breath sounds.   Musculoskeletal:      Cervical back: Normal range of motion and neck supple.   Neurological:      Mental Status: She is alert.         Assessment & Plan:     81 y.o. female with the following -     1. Recurrent major depressive disorder, in partial remission (HCC)  Chronic, controlled.  PHQ 2 in May, 2022 was 0 indicating her depression continues to be very well controlled with her current regimen.  We will continue that regimen.  -Sertraline 200 mg daily    2. Stage 3a chronic kidney disease (HCC)  Chronic, stable.  Recent labs in May, 2022 shows that her kidney function remained stable.  She is due for some lab work for monitoring with chronic renal disease which has been ordered today.  - MICROALBUMIN CREAT RATIO URINE; Future  - Comp Metabolic Panel; Future  - CBC WITHOUT DIFFERENTIAL; Future    3. Pulmonary hypertension (HCC)  4. Encounter for consultation  Chronic, new diagnosis.  Echocardiogram in October, 2021 that was obtained for heart murmur showed an incidental pulmonary hypertension with RSVP 45 mmHg.  She has no known history of lung disease or SAMY.  I plan to get a pulse oximetry overnight to screen for hypoxic conditions but I will also send an E consult to cardiology to see if any other work-up is recommended.  - E-Consult to Cardiology    Return in about 4 weeks (around 8/11/2022) for F/u labs, cardiology e-consult.    Please note that this dictation was created using voice recognition software. I have made every reasonable attempt to correct obvious errors, but I expect that there are errors of grammar and possibly content that I did not discover before finalizing the note.

## 2022-07-14 NOTE — PROGRESS NOTES
E-Consult Response     After careful review of the patient's information available in the medical record, the following are my findings and recommendations:    Reason for consult: Pulmonary hypertension    Summary of data reviewed: Patient was recently seen by her primary care provider and noted to have incidental pulmonary hypertension RVSP noted to 45 mmHg in October 2021.  She apparently has no symptoms and no known lung disease.    I reviewed the echocardiographic images and agree with the assessment and find the pulmonary hypertension is an isolated abnormality.  Importantly there is no concurrent evidence of right ventricular dysfunction, but diastolic function is indeterminate with a normal left atrial size.  The most recent EKG and chest x-ray both performed in 2017 do not show any evidence of right ventricular abnormality or lung disease.    Dr. Diaz plans to perform overnight oximetry.    Recommendations:   Given that the degree of pulmonary hypertension identified as mild and there are no associated symptoms, I advise limited work-up including the overnight oximeter but would also include spirometry.  If overt symptoms of right heart failure or progressive breathlessness develop then further evaluation would be warranted-and a consultation could be obtained at that time.    E-Consult Time: 20 minutes were spent with >50% of the total time spent reviewing items outlined in the summary of data reviewed (Use code [id number])    Cruzito Diamond M.D.

## 2022-07-25 ENCOUNTER — HOSPITAL ENCOUNTER (OUTPATIENT)
Dept: LAB | Facility: MEDICAL CENTER | Age: 82
End: 2022-07-25
Attending: FAMILY MEDICINE
Payer: MEDICARE

## 2022-07-25 DIAGNOSIS — N18.31 STAGE 3A CHRONIC KIDNEY DISEASE: ICD-10-CM

## 2022-07-25 LAB
CREAT UR-MCNC: 80.99 MG/DL
ERYTHROCYTE [DISTWIDTH] IN BLOOD BY AUTOMATED COUNT: 47.1 FL (ref 35.9–50)
HCT VFR BLD AUTO: 43.6 % (ref 37–47)
HGB BLD-MCNC: 14.1 G/DL (ref 12–16)
MCH RBC QN AUTO: 29.4 PG (ref 27–33)
MCHC RBC AUTO-ENTMCNC: 32.3 G/DL (ref 33.6–35)
MCV RBC AUTO: 91 FL (ref 81.4–97.8)
MICROALBUMIN UR-MCNC: <1.2 MG/DL
MICROALBUMIN/CREAT UR: NORMAL MG/G (ref 0–30)
PLATELET # BLD AUTO: 260 K/UL (ref 164–446)
PMV BLD AUTO: 11.2 FL (ref 9–12.9)
RBC # BLD AUTO: 4.79 M/UL (ref 4.2–5.4)
WBC # BLD AUTO: 5.3 K/UL (ref 4.8–10.8)

## 2022-07-25 PROCEDURE — 82043 UR ALBUMIN QUANTITATIVE: CPT

## 2022-07-25 PROCEDURE — 82570 ASSAY OF URINE CREATININE: CPT

## 2022-07-25 PROCEDURE — 36415 COLL VENOUS BLD VENIPUNCTURE: CPT

## 2022-07-25 PROCEDURE — 80053 COMPREHEN METABOLIC PANEL: CPT

## 2022-07-25 PROCEDURE — 85027 COMPLETE CBC AUTOMATED: CPT

## 2022-07-26 LAB
ALBUMIN SERPL BCP-MCNC: 4.2 G/DL (ref 3.2–4.9)
ALBUMIN/GLOB SERPL: 1.9 G/DL
ALP SERPL-CCNC: 61 U/L (ref 30–99)
ALT SERPL-CCNC: 16 U/L (ref 2–50)
ANION GAP SERPL CALC-SCNC: 10 MMOL/L (ref 7–16)
AST SERPL-CCNC: 20 U/L (ref 12–45)
BILIRUB SERPL-MCNC: 0.4 MG/DL (ref 0.1–1.5)
BUN SERPL-MCNC: 19 MG/DL (ref 8–22)
CALCIUM SERPL-MCNC: 9.4 MG/DL (ref 8.5–10.5)
CHLORIDE SERPL-SCNC: 103 MMOL/L (ref 96–112)
CO2 SERPL-SCNC: 25 MMOL/L (ref 20–33)
CREAT SERPL-MCNC: 1 MG/DL (ref 0.5–1.4)
GFR SERPLBLD CREATININE-BSD FMLA CKD-EPI: 56 ML/MIN/1.73 M 2
GLOBULIN SER CALC-MCNC: 2.2 G/DL (ref 1.9–3.5)
GLUCOSE SERPL-MCNC: 91 MG/DL (ref 65–99)
POTASSIUM SERPL-SCNC: 5.3 MMOL/L (ref 3.6–5.5)
PROT SERPL-MCNC: 6.4 G/DL (ref 6–8.2)
SODIUM SERPL-SCNC: 138 MMOL/L (ref 135–145)

## 2022-08-18 ENCOUNTER — OFFICE VISIT (OUTPATIENT)
Dept: MEDICAL GROUP | Facility: PHYSICIAN GROUP | Age: 82
End: 2022-08-18
Payer: MEDICARE

## 2022-08-18 VITALS
HEART RATE: 60 BPM | BODY MASS INDEX: 23.05 KG/M2 | WEIGHT: 155.6 LBS | TEMPERATURE: 97.5 F | DIASTOLIC BLOOD PRESSURE: 68 MMHG | OXYGEN SATURATION: 95 % | HEIGHT: 69 IN | SYSTOLIC BLOOD PRESSURE: 124 MMHG

## 2022-08-18 DIAGNOSIS — N18.31 STAGE 3A CHRONIC KIDNEY DISEASE: ICD-10-CM

## 2022-08-18 DIAGNOSIS — I27.20 PULMONARY HYPERTENSION (HCC): ICD-10-CM

## 2022-08-18 PROCEDURE — 99213 OFFICE O/P EST LOW 20 MIN: CPT | Performed by: FAMILY MEDICINE

## 2022-08-18 ASSESSMENT — ENCOUNTER SYMPTOMS
FEVER: 0
CHILLS: 0
SHORTNESS OF BREATH: 0

## 2022-08-18 ASSESSMENT — PATIENT HEALTH QUESTIONNAIRE - PHQ9
5. POOR APPETITE OR OVEREATING: 0 - NOT AT ALL
SUM OF ALL RESPONSES TO PHQ QUESTIONS 1-9: 5
CLINICAL INTERPRETATION OF PHQ2 SCORE: 3

## 2022-08-18 ASSESSMENT — FIBROSIS 4 INDEX: FIB4 SCORE: 1.58

## 2022-08-18 NOTE — PROGRESS NOTES
"Subjective:     CC: review labs, e-consult    HPI:   Bernie presents today with    Problem   Stage 3 Chronic Kidney Disease (Hcc)    This is a chronic condition.  Onset: at least 2017  EGFR: 56 (7/2022)  Microalb/Cr ratio: wnl (7/2022)  Stage: **  Serum albumin: 4.2 (7/2022) - due 7/2023  Alk phos: 54 (7/2020) - no repeat necessary  Ca: 9.4 (7/2022) - due 1/2023  Vit D: 36 (2018) - no repeat necessary  PTH: 41.9, wnl (7/2021) - no repeat necessary  Anemia: 14.1/43.6 (7/2022) - due 7/2023  Avoiding nephrotoxic agents? yes         Health Maintenance: Completed    ROS:  Review of Systems   Constitutional:  Negative for chills and fever.   Respiratory:  Negative for shortness of breath.    Cardiovascular:  Negative for chest pain.     Objective:     Exam:  /68 (BP Location: Right arm, Patient Position: Sitting, BP Cuff Size: Adult)   Pulse 60   Temp 36.4 °C (97.5 °F) (Temporal)   Ht 1.753 m (5' 9\")   Wt 70.6 kg (155 lb 9.6 oz)   SpO2 95%   BMI 22.98 kg/m²  Body mass index is 22.98 kg/m².    Physical Exam  Constitutional:       Appearance: Normal appearance.   Cardiovascular:      Rate and Rhythm: Normal rate and regular rhythm.      Heart sounds: Normal heart sounds.   Pulmonary:      Effort: Pulmonary effort is normal.      Breath sounds: Normal breath sounds.   Musculoskeletal:      Cervical back: Normal range of motion and neck supple.   Neurological:      Mental Status: She is alert.       Assessment & Plan:     81 y.o. female with the following -     1. Stage 3a chronic kidney disease (HCC)  Chronic, stable. She is up to date with her monitoring labs and avoiding nephrotoxic agents. We will continue to monitor yearly.    2. Pulmonary hypertension (HCC)  Chronic, stable. This is an incidental finding and we will do a work up to look for potential hypoxic conditions.  - Overnight Oximetry; Future  - PULMONARY FUNCTION TESTS -Test requested: Complete Pulmonary Function Test; Future    Return in about 6 " months (around 2/18/2023) for Med check.    Please note that this dictation was created using voice recognition software. I have made every reasonable attempt to correct obvious errors, but I expect that there are errors of grammar and possibly content that I did not discover before finalizing the note.

## 2022-09-22 ENCOUNTER — TELEPHONE (OUTPATIENT)
Dept: MEDICAL GROUP | Facility: PHYSICIAN GROUP | Age: 82
End: 2022-09-22
Payer: MEDICARE

## 2022-10-07 ENCOUNTER — TELEPHONE (OUTPATIENT)
Dept: MEDICAL GROUP | Facility: PHYSICIAN GROUP | Age: 82
End: 2022-10-07

## 2022-10-07 ENCOUNTER — OFFICE VISIT (OUTPATIENT)
Dept: MEDICAL GROUP | Facility: PHYSICIAN GROUP | Age: 82
End: 2022-10-07
Payer: MEDICARE

## 2022-10-07 VITALS
SYSTOLIC BLOOD PRESSURE: 142 MMHG | DIASTOLIC BLOOD PRESSURE: 60 MMHG | OXYGEN SATURATION: 100 % | TEMPERATURE: 98.3 F | HEIGHT: 69 IN | WEIGHT: 154 LBS | HEART RATE: 61 BPM | BODY MASS INDEX: 22.81 KG/M2

## 2022-10-07 DIAGNOSIS — R53.83 OTHER FATIGUE: ICD-10-CM

## 2022-10-07 DIAGNOSIS — Z23 NEED FOR VACCINATION: ICD-10-CM

## 2022-10-07 DIAGNOSIS — I27.20 PULMONARY HYPERTENSION (HCC): ICD-10-CM

## 2022-10-07 PROCEDURE — 90662 IIV NO PRSV INCREASED AG IM: CPT | Performed by: FAMILY MEDICINE

## 2022-10-07 PROCEDURE — 99213 OFFICE O/P EST LOW 20 MIN: CPT | Mod: 25 | Performed by: FAMILY MEDICINE

## 2022-10-07 PROCEDURE — G0008 ADMIN INFLUENZA VIRUS VAC: HCPCS | Performed by: FAMILY MEDICINE

## 2022-10-07 ASSESSMENT — ENCOUNTER SYMPTOMS
NAUSEA: 0
FEVER: 0
CHILLS: 0
VOMITING: 0

## 2022-10-07 ASSESSMENT — FIBROSIS 4 INDEX: FIB4 SCORE: 1.6

## 2022-10-07 NOTE — TELEPHONE ENCOUNTER
Phone Number Called: [phone] (home) [phone] (work)      Call outcome:  Spoke to step daughter    Message: stated that they were concerned because they were unable to bring up what was really concerning them regarding the patient. Step daughter is concerned of patient cognitive decline. I explained to step daughter that in order for us to discuss concern they need to make an appointment with Dr. Diaz for the patient and let the patient know what the appointment is for and that the patient has to present during the visit. Step daughter was frustrated since she new the patient would be upset if she was aware. I explained to the step daughter that due to HIPAA we are unable to bring up any concern that was discussed with use regarding the patient with out their knowledge. Either the patient or the step daughter needs to bring it up during an appointment with Dr. Diaz with both of them present. Step Daughter stated that she has power of  over patient. I explained that it only goes into effect if the patient is unconscious and not able to make medical decisions for herself.

## 2022-10-07 NOTE — PATIENT INSTRUCTIONS
For the lung function testing  - Call [phone] to schedule    For the overnight oxygen test  - I will order through a [employer] company who is contracted with your insurance  - They should call you when the device is ready for . They'll tell you how to put it on    For the Fatigue  - I've ordered non-fasting blood tests for some vitamins

## 2022-10-07 NOTE — PROGRESS NOTES
"Subjective:     CC: testing, fatigue    HPI:   Bernie presents today with    Problem   Other Fatigue    Chronic.  Since 2018 or sooner    Wakes up tired.  No snoring, no witnessed apneic episodes  No constipation, ?cold intolerance - swings between hot & cold frequently         Health Maintenance: Completed    ROS:  Review of Systems   Constitutional:  Negative for chills and fever.   Cardiovascular:  Negative for chest pain.   Gastrointestinal:  Negative for nausea and vomiting.     Objective:     Exam:  BP (!) 142/60 (BP Location: Right arm, Patient Position: Sitting, BP Cuff Size: Adult)   Pulse 61   Temp 36.8 °C (98.3 °F) (Temporal)   Ht 1.753 m (5' 9\")   Wt 69.9 kg (154 lb)   SpO2 100%   BMI 22.74 kg/m²  Body mass index is 22.74 kg/m².    Physical Exam  Constitutional:       Appearance: Normal appearance.   Cardiovascular:      Rate and Rhythm: Normal rate and regular rhythm.      Heart sounds: Normal heart sounds.   Pulmonary:      Effort: Pulmonary effort is normal.      Breath sounds: Normal breath sounds.   Musculoskeletal:      Cervical back: Normal range of motion and neck supple.   Neurological:      Mental Status: She is alert.       Assessment & Plan:     82 y.o. female with the following -     1. Other fatigue  Chronic, stable.  Since at least 2018 she struggled with chronic fatigue.  Labs from earlier this year do not show any etiology at this time-no anemia, thyroid abnormality, electrolyte abnormality.  We will go ahead and check a vitamin D and B12 level as these deficiencies can contribute to fatigue.  If they are unremarkable then we will await the results further work-up of her pulmonary hypertension.  - VITAMIN D,25 HYDROXY (DEFICIENCY); Future  - VITAMIN B12; Future    2. Pulmonary hypertension (HCC)  Chronic, stable.  At a previous appointment I did order testing to evaluate for hypoxic condition that would be contributing to pulmonary hypertension.  There is a miscommunication and she " has not had this testing done yet.  I have given her the phone number to call to schedule her PFTs.  We will order the overnight pulse oximetry through her DME and they should contact her.    3. Need for vaccination  - INFLUENZA VACCINE, HIGH DOSE (65+ ONLY)    Return if symptoms worsen or fail to improve.    Please note that this dictation was created using voice recognition software. I have made every reasonable attempt to correct obvious errors, but I expect that there are errors of grammar and possibly content that I did not discover before finalizing the note.

## 2022-10-27 ENCOUNTER — HOSPITAL ENCOUNTER (OUTPATIENT)
Dept: LAB | Facility: MEDICAL CENTER | Age: 82
End: 2022-10-27
Attending: FAMILY MEDICINE
Payer: MEDICARE

## 2022-10-27 DIAGNOSIS — R53.83 OTHER FATIGUE: ICD-10-CM

## 2022-10-27 LAB
25(OH)D3 SERPL-MCNC: 58 NG/ML (ref 30–100)
VIT B12 SERPL-MCNC: 439 PG/ML (ref 211–911)

## 2022-10-27 PROCEDURE — 82607 VITAMIN B-12: CPT

## 2022-10-27 PROCEDURE — 36415 COLL VENOUS BLD VENIPUNCTURE: CPT

## 2022-10-27 PROCEDURE — 82306 VITAMIN D 25 HYDROXY: CPT

## 2022-11-04 ENCOUNTER — TELEPHONE (OUTPATIENT)
Dept: MEDICAL GROUP | Facility: PHYSICIAN GROUP | Age: 82
End: 2022-11-04

## 2022-11-04 NOTE — TELEPHONE ENCOUNTER
We sent orders to [facility] to perform an overnight oximetry test.    Did they receive the order? Can they schedule with the patient.

## 2022-11-04 NOTE — TELEPHONE ENCOUNTER
Pt malvin came in today to find out if  was able to find a sleep study center in network with [facility] because she stated that the patient is getting worse and they have not heard back to find out what was going on. Please call malvin to let her know what is happening. Thank you

## 2022-11-07 NOTE — TELEPHONE ENCOUNTER
[facility] stated they have the order and shipped out to patient, stated they will get ahold of patient

## 2022-12-01 ENCOUNTER — TELEPHONE (OUTPATIENT)
Dept: MEDICAL GROUP | Facility: PHYSICIAN GROUP | Age: 82
End: 2022-12-01
Payer: MEDICARE

## 2022-12-01 NOTE — TELEPHONE ENCOUNTER
1. Caller Name: Yoselyn Cunha pts step daughter                        Call Back Number: [phone]      How would the patient prefer to be contacted with a response: Phone call OK to leave a detailed message    Pts step-daughter came into clinic today inquiring about the overnight oximetry test. The patient still has not received any information from [facility] about scheduling the test. There is a fax in pts chart from company called [employer] stating that they are currently unable to schedule the test. No further information or explanation of what [employer] is. The Overnight Oximetry study was sent to [facility] on 10/11/22.    Pts daughter requesting help scheduling test for pt

## 2022-12-02 NOTE — TELEPHONE ENCOUNTER
Phone Number Called: [phone] (home) [phone] (work)      Call outcome: Spoke to patient regarding message below.    Message: pt notified and order refaxed

## 2022-12-02 NOTE — TELEPHONE ENCOUNTER
Thank you for checking on this. Please call Bernie's step daughter back to inform them that we are faxing the order again and to contact us in a week if they still haven't heard anything from [facility].

## 2022-12-02 NOTE — TELEPHONE ENCOUNTER
Interesting, 3 weeks ago when we contacted [facility], they reported they had the order and was going to contact the patient and ship the oximeter to them.    Could you please call [facility] again and figure out what is going on? Thank you.

## 2022-12-02 NOTE — TELEPHONE ENCOUNTER
Spoke to Derick from [facility] states she cannot find the order. Asked us to refax this.

## 2022-12-08 ENCOUNTER — TELEPHONE (OUTPATIENT)
Dept: MEDICAL GROUP | Facility: PHYSICIAN GROUP | Age: 82
End: 2022-12-08
Payer: MEDICARE

## 2022-12-08 NOTE — TELEPHONE ENCOUNTER
Please call Joanie. On 12/2/2022 we refaxed the overnight oximetry order. The step daughter is calling again because they haven't received it.    If this continues to be problematic, we will fax to a different company or I will reach out to [facility] sleep to figure out how to get this overnight oximetry done.    Then please call the stepdaughter back.

## 2023-01-10 RX ORDER — LEVOTHYROXINE SODIUM 0.1 MG/1
100 TABLET ORAL DAILY
Qty: 90 TABLET | Refills: 0 | Status: SHIPPED | OUTPATIENT
Start: 2023-01-10 | End: 2023-05-04

## 2023-01-10 NOTE — TELEPHONE ENCOUNTER
Received request via: Pharmacy    Was the patient seen in the last year in this department? Yes    Does the patient have an active prescription (recently filled or refills available) for medication(s) requested? No    Does the patient have USP Plus and need 100 day supply (blood pressure, diabetes and cholesterol meds only)? Medication is not for cholesterol, blood pressure or diabetes

## 2023-01-19 ENCOUNTER — TELEPHONE (OUTPATIENT)
Dept: MEDICAL GROUP | Facility: PHYSICIAN GROUP | Age: 83
End: 2023-01-19
Payer: MEDICARE

## 2023-05-04 RX ORDER — LEVOTHYROXINE SODIUM 0.1 MG/1
TABLET ORAL
Qty: 90 TABLET | Refills: 1 | Status: SHIPPED | OUTPATIENT
Start: 2023-05-04 | End: 2024-01-22

## 2023-05-16 ENCOUNTER — TELEPHONE (OUTPATIENT)
Dept: HEALTH INFORMATION MANAGEMENT | Facility: OTHER | Age: 83
End: 2023-05-16
Payer: MEDICARE

## 2023-06-26 ENCOUNTER — OFFICE VISIT (OUTPATIENT)
Dept: MEDICAL GROUP | Facility: PHYSICIAN GROUP | Age: 83
End: 2023-06-26
Payer: MEDICARE

## 2023-06-26 ENCOUNTER — HOSPITAL ENCOUNTER (OUTPATIENT)
Dept: LAB | Facility: MEDICAL CENTER | Age: 83
End: 2023-06-26
Attending: FAMILY MEDICINE
Payer: MEDICARE

## 2023-06-26 VITALS
WEIGHT: 156.4 LBS | BODY MASS INDEX: 23.16 KG/M2 | DIASTOLIC BLOOD PRESSURE: 54 MMHG | HEART RATE: 56 BPM | HEIGHT: 69 IN | SYSTOLIC BLOOD PRESSURE: 126 MMHG | TEMPERATURE: 97.2 F | OXYGEN SATURATION: 93 %

## 2023-06-26 DIAGNOSIS — I27.20 PULMONARY HYPERTENSION (HCC): ICD-10-CM

## 2023-06-26 DIAGNOSIS — G89.29 CHRONIC PAIN OF RIGHT KNEE: ICD-10-CM

## 2023-06-26 DIAGNOSIS — R09.02 HYPOXIA: Primary | ICD-10-CM

## 2023-06-26 DIAGNOSIS — M25.561 CHRONIC PAIN OF RIGHT KNEE: ICD-10-CM

## 2023-06-26 DIAGNOSIS — N18.31 STAGE 3A CHRONIC KIDNEY DISEASE: ICD-10-CM

## 2023-06-26 DIAGNOSIS — G31.84 MILD COGNITIVE IMPAIRMENT: ICD-10-CM

## 2023-06-26 DIAGNOSIS — F33.41 RECURRENT MAJOR DEPRESSIVE DISORDER, IN PARTIAL REMISSION (HCC): ICD-10-CM

## 2023-06-26 PROBLEM — M25.569 KNEE PAIN: Status: ACTIVE | Noted: 2023-06-26

## 2023-06-26 LAB
ALBUMIN SERPL BCP-MCNC: 4.5 G/DL (ref 3.2–4.9)
ALBUMIN/GLOB SERPL: 1.9 G/DL
ALP SERPL-CCNC: 67 U/L (ref 30–99)
ALT SERPL-CCNC: 16 U/L (ref 2–50)
ANION GAP SERPL CALC-SCNC: 13 MMOL/L (ref 7–16)
AST SERPL-CCNC: 15 U/L (ref 12–45)
BILIRUB SERPL-MCNC: 0.3 MG/DL (ref 0.1–1.5)
BUN SERPL-MCNC: 28 MG/DL (ref 8–22)
CALCIUM ALBUM COR SERPL-MCNC: 9.1 MG/DL (ref 8.5–10.5)
CALCIUM SERPL-MCNC: 9.5 MG/DL (ref 8.5–10.5)
CHLORIDE SERPL-SCNC: 104 MMOL/L (ref 96–112)
CO2 SERPL-SCNC: 24 MMOL/L (ref 20–33)
CREAT SERPL-MCNC: 1.2 MG/DL (ref 0.5–1.4)
CREAT UR-MCNC: 176.49 MG/DL
ERYTHROCYTE [DISTWIDTH] IN BLOOD BY AUTOMATED COUNT: 51.9 FL (ref 35.9–50)
GFR SERPLBLD CREATININE-BSD FMLA CKD-EPI: 45 ML/MIN/1.73 M 2
GLOBULIN SER CALC-MCNC: 2.4 G/DL (ref 1.9–3.5)
GLUCOSE SERPL-MCNC: 62 MG/DL (ref 65–99)
HCT VFR BLD AUTO: 46.9 % (ref 37–47)
HGB BLD-MCNC: 14.8 G/DL (ref 12–16)
MCH RBC QN AUTO: 29.4 PG (ref 27–33)
MCHC RBC AUTO-ENTMCNC: 31.6 G/DL (ref 32.2–35.5)
MCV RBC AUTO: 93.2 FL (ref 81.4–97.8)
MICROALBUMIN UR-MCNC: 5.1 MG/DL
MICROALBUMIN/CREAT UR: 29 MG/G (ref 0–30)
PLATELET # BLD AUTO: 258 K/UL (ref 164–446)
PMV BLD AUTO: 10.5 FL (ref 9–12.9)
POTASSIUM SERPL-SCNC: 4.9 MMOL/L (ref 3.6–5.5)
PROT SERPL-MCNC: 6.9 G/DL (ref 6–8.2)
RBC # BLD AUTO: 5.03 M/UL (ref 4.2–5.4)
SODIUM SERPL-SCNC: 141 MMOL/L (ref 135–145)
WBC # BLD AUTO: 5.7 K/UL (ref 4.8–10.8)

## 2023-06-26 PROCEDURE — 3078F DIAST BP <80 MM HG: CPT | Performed by: FAMILY MEDICINE

## 2023-06-26 PROCEDURE — 82570 ASSAY OF URINE CREATININE: CPT

## 2023-06-26 PROCEDURE — 99214 OFFICE O/P EST MOD 30 MIN: CPT | Performed by: FAMILY MEDICINE

## 2023-06-26 PROCEDURE — 85027 COMPLETE CBC AUTOMATED: CPT

## 2023-06-26 PROCEDURE — 36415 COLL VENOUS BLD VENIPUNCTURE: CPT

## 2023-06-26 PROCEDURE — 80053 COMPREHEN METABOLIC PANEL: CPT

## 2023-06-26 PROCEDURE — 3074F SYST BP LT 130 MM HG: CPT | Performed by: FAMILY MEDICINE

## 2023-06-26 PROCEDURE — 82043 UR ALBUMIN QUANTITATIVE: CPT

## 2023-06-26 RX ORDER — ZOLPIDEM TARTRATE 5 MG/1
5 TABLET ORAL NIGHTLY PRN
Qty: 1 TABLET | Refills: 0 | Status: SHIPPED | OUTPATIENT
Start: 2023-06-26 | End: 2023-06-27

## 2023-06-26 ASSESSMENT — ENCOUNTER SYMPTOMS
FEVER: 0
SHORTNESS OF BREATH: 0
CHILLS: 0

## 2023-06-26 ASSESSMENT — PATIENT HEALTH QUESTIONNAIRE - PHQ9: CLINICAL INTERPRETATION OF PHQ2 SCORE: 0

## 2023-06-26 ASSESSMENT — FIBROSIS 4 INDEX: FIB4 SCORE: 1.6

## 2023-06-26 NOTE — PATIENT INSTRUCTIONS
TO DO LIST  - Call [phone] to schedule an appointment with Dr. Deng for your memory  - We will get a sleep study to see if you  have sleep apnea to explain your low oxygen while you sleep  - Consider a 2nd dose of the bivalent booster for COVID-19

## 2023-06-26 NOTE — PROGRESS NOTES
"Subjective:     CC: oxygen, knee pain, memory    HPI:   Bernie presents today with    Problem   Hypoxia    1/2023 overnight oximetry: 155 min <88%     Knee Pain    >1 year  R knee pain  Intermittent  Unable to characterize the pain  No recalled trauma  No ice/heat, no OTC the pain  She doesn't think it is that bad  Step-daughter notes she complains of it all the time.         Health Maintenance: Completed    ROS:  Review of Systems   Constitutional:  Negative for chills and fever.   Respiratory:  Negative for shortness of breath.    Cardiovascular:  Negative for chest pain.       Objective:     Exam:  /54 (BP Location: Right arm, Patient Position: Sitting, BP Cuff Size: Adult)   Pulse (!) 56   Temp 36.2 °C (97.2 °F) (Temporal)   Ht 1.753 m (5' 9\")   Wt 70.9 kg (156 lb 6.4 oz)   SpO2 93%   BMI 23.10 kg/m²  Body mass index is 23.1 kg/m².    Physical Exam  Constitutional:       Appearance: Normal appearance.   Cardiovascular:      Rate and Rhythm: Normal rate and regular rhythm.      Heart sounds: Normal heart sounds.   Pulmonary:      Effort: Pulmonary effort is normal.      Breath sounds: Normal breath sounds.   Musculoskeletal:      Cervical back: Normal range of motion and neck supple.      Comments: R Knee: Full ROM, full strength, TTP not present, edema neg, varus stress neg, valgus stress positive bilaterally with no pain, anterior/posterior drawer neg, Lachman's neg   Neurological:      Mental Status: She is alert.             Assessment & Plan:     82 y.o. female with the following -     1. Hypoxia  2. Pulmonary hypertension (HCC)  Chronic, stable.  Overnight oximetry did show oxygen dipped below 88%.  She does have a history of pulmonary hypertension and I am suspicious that she has sleep apnea to explain both of these issues.  Stop bang score is 2.  I have ordered a home sleep study and some Ambien to help her sleep.  - Overnight Home Sleep Study; Future  - zolpidem (AMBIEN) 5 MG Tab; Take 1 Tablet " by mouth at bedtime as needed for Sleep for up to 1 day.  Dispense: 1 Tablet; Refill: 0    3. Mild cognitive impairment  Chronic, stable.  On MoCA she has documented memory loss.  She did see Dr. Deng a year ago who ordered a brain MRI but neither she nor her stepdaughter recall this.  She never got the brain MRI and it feels like her memory is worsening.  I have encouraged her to follow-up with neurology.    4. Stage 3a chronic kidney disease (HCC)  Chronic, stable.  She is due for yearly labs to monitor kidney function which has been ordered today.  - CBC WITHOUT DIFFERENTIAL; Future  - Comp Metabolic Panel; Future  - MICROALBUMIN CREAT RATIO URINE; Future    5. Chronic pain of right knee  Chronic, stable.  For more than a year she has been complaining of right-sided knee pain.  She cannot tell me the characterization of the pain and she cannot recall.  She does not know if there was any precipitating trauma.  She does not feel that it is that significant but her stepdaughter reports that she complains of knee pain constantly.  Knee exam was completely benign today except for laxity of the medial collateral ligament bilaterally so I do not think that is contributing to her pain.  She does have crepitus when you flex and extend the knee so I suspect that most of her pain is coming from arthritis.  - Conservative management for arthritis was discussed.      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 Continue with current defined treatment plan: sertraline 200 mg daily. Follow-up at least annually.  Last edited 06/26/23 14:02 PDT by Kendra Diaz M.D.         Return in about 3 months (around 9/26/2023) for f/u sleep study.    Please note that this dictation was created using voice recognition software. I have made every reasonable attempt to correct obvious errors, but I expect that there are errors of  grammar and possibly content that I did not discover before finalizing the note.

## 2023-06-29 ENCOUNTER — TELEPHONE (OUTPATIENT)
Dept: HEALTH INFORMATION MANAGEMENT | Facility: OTHER | Age: 83
End: 2023-06-29
Payer: MEDICARE

## 2023-07-05 ENCOUNTER — TELEPHONE (OUTPATIENT)
Dept: MEDICAL GROUP | Facility: PHYSICIAN GROUP | Age: 83
End: 2023-07-05
Payer: MEDICARE

## 2023-07-06 NOTE — TELEPHONE ENCOUNTER
VOICEMAIL  1. Caller Name: Brook Tamezs stepdaughter                         Call Back Number: [phone]    2. Message: Yoselyn called on behalf of Bernie requesting a referral to memory care. Yoselyn stated that she is very concerned about Bernie's dementia and that this referral is very urgent. Yoselyn also stated that Bernie has not been eating very well in the last week.     Please Advise.

## 2023-07-06 NOTE — TELEPHONE ENCOUNTER
Does she want a referral to a memory care facility for her to live at? If so, we do not do that. They would find the memory care facility and start the process for moving in. These are not medical facilities so no referral can be placed.

## 2023-07-06 NOTE — TELEPHONE ENCOUNTER
Fasting glucose within normal limits. Phone Number Called: [phone]     Call outcome: Spoke to patient regarding message below.    Message: Spoke with Yoselyn to get clarification on the referral they were looking to get. Yoselyn stated that Dr. Diaz mentioned a referral for memory care at their last visit. Yoselyn wasn't quite sure exactly where the referral was to and mentioned the possibility of a referral to neurology as well.   Another issue that Yoselyn brought up was their referral for Bernie's sleep study. Yoselyn stated that bringing the equipment home for the sleep study would be too much for them and would like to do an in-house sleep study at the facility instead.     Please advise.

## 2023-07-06 NOTE — TELEPHONE ENCOUNTER
Phone Number Called: [phone]       Call outcome: Did not leave a detailed message. Requested patient to call back.    Message: Called Yoselyn to get clarification on their request but there was no answer.     LVM attempt 1

## 2023-07-06 NOTE — TELEPHONE ENCOUNTER
I told Bernie and Yoselyn at the last visit that Bernie needs to see Dr. Deng again. She doesn't need a referral for that because she has already seen him. She can call [phone] to schedule with Dr. Deng.    Regarding the sleep study, she will have to contact her insurance to see if they will cover an in-lab sleep study. Most insurances only cover the home studies now. If her insurance covers an in-lab sleep study, she can contact us and I'll place a new order for an in-lab sleep study. If they do not cover the in-lab study, then they'll have to do the home sleep study as originally ordered.

## 2023-07-27 ENCOUNTER — TELEPHONE (OUTPATIENT)
Dept: MEDICAL GROUP | Facility: PHYSICIAN GROUP | Age: 83
End: 2023-07-27
Payer: MEDICARE

## 2023-07-27 DIAGNOSIS — G47.30 SLEEP APNEA, UNSPECIFIED TYPE: ICD-10-CM

## 2023-07-27 DIAGNOSIS — R09.02 HYPOXIA: ICD-10-CM

## 2023-07-27 DIAGNOSIS — I27.20 PULMONARY HYPERTENSION (HCC): ICD-10-CM

## 2023-07-27 DIAGNOSIS — G47.19 EXCESSIVE DAYTIME SLEEPINESS: ICD-10-CM

## 2023-07-27 NOTE — TELEPHONE ENCOUNTER
VOICEMAIL  1. Caller Name: Yoselyn                        Call Back Number: [phone] (home) [phone] (work)      2. Message: Yoselyn LVM to follow up on their request to have Bernie do an in lab sleep study instead of doing it at home (telephone encounter from 7/5/23). Yoselyn stated that she spoke with Bernie's insurance and they said they would cover the in lab sleep study. With that being said, Yoselyn is requesting a new order for the sleep study to be in lab.     Please advise.

## 2023-07-28 ENCOUNTER — TELEPHONE (OUTPATIENT)
Dept: NEUROLOGY | Facility: MEDICAL CENTER | Age: 83
End: 2023-07-28
Payer: MEDICARE

## 2023-07-28 NOTE — TELEPHONE ENCOUNTER

## 2023-07-28 NOTE — TELEPHONE ENCOUNTER
In lab sleep study has been ordered. She will get contacted regarding scheduling the sleep study once it has been authorized by her insurance.   [facility]  Cardiology  Progress Note    Patient Name: Vashti Muñoz  MRN: [id number]  Admission Date: 9/10/2018  Hospital Length of Stay: 0 days  Code Status: DNR   Attending Physician: Harish Garcia MD   Primary Care Physician: Harish Garcia MD  Expected Discharge Date:   Principal Problem:Atrial fibrillation with rapid ventricular response    Subjective:     Hospital Course:   9/11/18: adm with nonsyncopal fall.  Chr AF with inc VR noted.    Interval Hx: no cp/sob.  Case d/w RN.    Tele: AF -120s (pers rev)      Past Medical History:   Diagnosis Date    Allergy     Anticoagulant long-term use     Coumadin    Anxiety     Arthritis     Atrial fibrillation     Cataract     Closed head injury 09/10/2018    Clotting disorder     Depression     Diabetes mellitus     Encounter for blood transfusion     Fall 09/10/2018    GERD (gastroesophageal reflux disease)     Heart murmur     [place] hearing[place]     wears bilateral hearing aids    Hypertension     Thyroid disease     Uses roller walker        Past Surgical History:   Procedure Laterality Date    HYSTERECTOMY      JOINT REPLACEMENT Right     Hip    TONSILLECTOMY      TOTAL HIP ARTHROPLASTY Left 2004       Review of patient's allergies indicates:   Allergen Reactions    Sulfa (sulfonamide antibiotics) Hives       No current facility-administered medications on file prior to encounter.      Current Outpatient Medications on File Prior to Encounter   Medication Sig    escitalopram oxalate (LEXAPRO) 10 MG tablet Take 1 tablet (10 mg total) by mouth once daily.    gabapentin (NEURONTIN) 300 MG capsule Take 300 mg by mouth 2 (two) times daily.    levothyroxine 137 mcg Cap Take 1 tablet by mouth once daily.    lisinopril (PRINIVIL,ZESTRIL) 40 MG tablet Take 1 tablet (40 mg total) by mouth once daily.    LORazepam (ATIVAN) 0.5 MG tablet Take 0.5 mg by mouth 2 (two) times daily. Take half in the am and a whole at  bedtime    metFORMIN (GLUCOPHAGE) 500 MG tablet Take 500 mg by mouth 2 (two) times daily with meals.    metoprolol succinate (TOPROL-XL) 100 MG 24 hr tablet Take 100 mg by mouth once daily.    omeprazole (PRILOSEC) 20 MG capsule Take 20 mg by mouth once daily.    simvastatin (ZOCOR) 40 MG tablet Take 40 mg by mouth every evening.    warfarin (COUMADIN) 5 MG tablet Take 5 mg by mouth. Only  5 ml On mon and thur. 4 ml tu, wed, fri sat and sun.     Family History     Problem Relation (Age of Onset)    Diabetes Father, Sister, Brother    No Known Problems Mother    Stroke Father        Tobacco Use    Smoking status: Former Smoker     Last attempt to quit: 1988     Years since quittin.2    Smokeless tobacco: Never Used   Substance and Sexual Activity    Alcohol use: Yes     Alcohol/week: 1.8 - 2.4 oz     Types: 1 Glasses of wine, 2 - 3 Standard drinks or equivalent per week    Drug use: No    Sexual activity: Not on file     Review of Systems   Gastrointestinal: Negative for melena.   Genitourinary: Negative for hematuria.     Objective:     Vital Signs (Most Recent):  Temp: 97.5 °F (36.4 °C) (18)  Pulse: 78 (18)  Resp: 18 (18)  BP: (!) 169/83 (18)  SpO2: 98 % (18) Vital Signs (24h Range):  Temp:  [97.5 °F (36.4 °C)-99.7 °F (37.6 °C)] 97.5 °F (36.4 °C)  Pulse:  [] 78  Resp:  [18] 18  SpO2:  [97 %-100 %] 98 %  BP: (106-169)/(69-93) 169/83     Weight: 84.3 kg (185 lb 13.6 oz)  Body mass index is 33.99 kg/m².    SpO2: 98 %  O2 Device (Oxygen Therapy): nasal cannula      Intake/Output Summary (Last 24 hours) at 2018 0610  Last data filed at 2018 1800  Gross per 24 hour   Intake 350 ml   Output --   Net 350 ml       Lines/Drains/Airways     Peripheral Intravenous Line                 Peripheral IV - Single Lumen 09/10/18 1446 Left Hand 1 day                Physical Exam   Constitutional: She is oriented to person, place, and time. She  appears well-developed and well-nourished. No distress.   HENT:   Head: Normocephalic and atraumatic.   Mouth/Throat: No oropharyngeal exudate.   Eyes: Conjunctivae and EOM are normal. Pupils are equal, round, and reactive to light. No scleral icterus.   Neck: Normal range of motion. Neck supple. No JVD present. No tracheal deviation present. No thyromegaly present.   Cardiovascular: S1 normal and S2 normal. An irregularly irregular rhythm present. Tachycardia present. Exam reveals no gallop and no friction rub.   Murmur heard.   Systolic murmur is present with a grade of 2/6.  Pulmonary/Chest: Effort normal and breath sounds normal. No respiratory distress. She has no wheezes. She has no rales. She exhibits no tenderness.   Abdominal: Soft. She exhibits no distension.   Musculoskeletal: Normal range of motion. She exhibits no edema.   Neurological: She is alert and oriented to person, place, and time. No cranial nerve deficit.   Skin: Skin is warm and dry. She is not diaphoretic.   Psychiatric: She has a normal mood and affect. Her behavior is normal. Judgment normal.       Current Medications:   escitalopram oxalate  10 mg Oral Daily    gabapentin  300 mg Oral BID    levothyroxine  137 mcg Oral Before breakfast    lisinopril  40 mg Oral Daily    LORazepam  0.5 mg Oral BID    metoprolol succinate  200 mg Oral Daily    pantoprazole  40 mg Oral Daily    simvastatin  40 mg Oral QHS    warfarin  5 mg Oral Daily       acetaminophen, dextrose 50%, dextrose 50%, glucagon (human recombinant), glucose, glucose, insulin aspart U-100    Laboratory:  CBC:  Recent Labs   Lab  07/05/18   1430  09/10/18   1119  09/11/18   0551   WHITE BLOOD CELL COUNT  3.7  4.42  3.45 L   HEMOGLOBIN  12.1  11.4 L  10.7 L   HEMATOCRIT  38.6  35.2 L  34.2 L   PLATELETS  161  145 L  126 L       CHEMISTRIES:  Recent Labs   Lab  07/05/18   1430  09/10/18   1119  09/11/18   0551   GLUCOSE  126 H  106  109   SODIUM  138  136  136   POTASSIUM   4.8  4.8  4.0   BUN BLD  20  13  12   CREATININE  1.07 H  0.9  0.8   EGFR IF    --   >60  >60   EGFR IF NON-  47 L  58 A  >60   CALCIUM  9.7  10.0  9.7       CARDIAC BIOMARKERS:  Recent Labs   Lab  09/10/18   1720  09/10/18   2334  09/11/18   0551  09/11/18   1104  09/11/18   1658   TROPONIN I  <0.006  <0.006  <0.006  <0.006  0.018       COAGS:  Recent Labs   Lab  07/05/18   1430  07/11/18   1201  07/19/18   1408  09/10/18   1446  09/11/18   0932   INR  5.1 >  1.5 H  2.0 H  2.7 H  3.3 H       LIPIDS/LFTS:  Recent Labs   Lab  07/05/18   1430  09/10/18   1119  09/11/18   0551   AST  18  17  13   ALT  12  14  12       BNP:  Recent Labs   Lab  09/11/18   0932   BNP  327 H       TSH:  Recent Labs   Lab  07/05/18   1429  09/11/18   0932   TSH  4.690 H  0.452       Free T4:        Diagnostic Results:  ECG (personally reviewed tracings):  9/10/18 1045 AF 72, PRWP (no prior tracings)    Echo: 7/9/18    1 - Normal left ventricular systolic function (EF 55-60%).     2 - Concentric remodeling.     3 - Mild left atrial enlargement.     4 - Pulmonary hypertension. The estimated PA systolic pressure is 44 mmHg.     5 - Mild to moderate aortic stenosis, RAD = 1.3 cm2, AVAi = 0.69 cm2/m2, peak velocity = 2.92 m/s, mean gradient = 17 mmHg.     6 - Moderate mitral regurgitation.     7 - Mild tricuspid regurgitation.       Assessment and Plan:     * Atrial fibrillation with rapid ventricular response    Has chronic AF on warfarin and amio  Amio stopped given Chr AF  Cont warfarin  No plan for repeat echo or WHITNEY/DCCV  Titrate toprol XL to 400mg qd  Next drug: dilt CD +/- digoxin  Likely home in 24 hrs assuming better rate control of AF        DM type 2, controlled, with complication    Per IM        Chronic anticoagulation    Goal INR 2.0-3.0        HTN, goal below 140/90    Cont med rx        Fall    No LOC, pt reports slip and fall.            VTE Risk Mitigation (From admission, onward)        Ordered      warfarin (COUMADIN) tablet 5 mg  Daily      09/11/18 4330          Ede Kline MD  Cardiology  [facility]

## 2023-07-28 NOTE — PATIENT INSTRUCTIONS
SLEEP STUDY INSTRUCTIONS    1. Our main concern is to provide the best test and evaluation of your sleep and your cooperation in following the guidelines is very necessary.    2. We have no facilities for family members or guests at the sleep center. Special arrangements will be made for children requiring overnight sleep studies.    3. Unless otherwise instructed, AVOID alcoholic beverages on the day of your sleep study.    4. DO NOT drink coffee or caffeine-containing beverages after 12:00 noon on the day of your sleep study.    5. There is NO smoking at the sleep center.    6. Try to maintain a usual daytime schedule prior to the study (avoid unusual physical activity or meals).    7. DO NOT take a nap on the day of your study.    8. This is an outpatient procedure (test); therefore, nursing services, medications, and meals ARE NOT provided. If you take medications, bring them with you and take them on the schedule you do at home.    9. Please fill your sleep aid prescription (Ambien or Lunesta) and bring to your sleep study. Even patients who normally have no problem going to sleep often need a sleep aid in this different environment.    10. We ask that you wear conventional sleep attire (pajamas or sweats) for the sleep study. We discourage patients from wearing only their underwear to bed. We recommend two-piece pajamas as the techs will need to apply sensors to your stomach.    11. Please shampoo your hair the day of the sleep study. Please DO NOT use any other hair or skin products before your arrival (e.g., mousse, gel, hair or body spray, perfume, body lotion etc.) NOTE: Women should not wear heavy makeup prior to arrival as some wires are taped to the face area.    12. The technician will be applying several small electrodes to the scalp, eye area, chin, chest, and legs, plus respiratory effort belts around the chest. Also, there will be a device placed directly under the nose. (THIS WILL NOT OBSTRUCT  YOUR BREATHING.) This is a painless procedure and the skin is not broken.    13. The test is generally completed in six to eight hours; We are usually done between 6 - 7 a.m., unless you are scheduled for a nap study. You may need to come back another night for a second study to complete your treatment plan.    14. Patients who are scheduled for an MSLT (nap study) will stay at the sleep center for the day following their nighttime study. You will be notified if a nap study was ordered for you at the time the night study is scheduled. Generally, patients having a nap study will leave the sleep center by 4 p.m.    15. You will need to bring food for the following day if you are scheduled for a nap study. A refrigerator and microwave are available.    16. A bathroom is available for your use.    17. We are able to adjust the room temperature for your comfort. Please let the technologist know if you are uncomfortable during the study.    18. If you sleep better with a special pillow or stuffed animal, you may bring it along. Service animals are the only live animals permitted.    19. Cable T.V. is available.    20. You will be scheduled for a follow-up appointment three to five days after the sleep study to review your results.    21. A copy of your sleep study is sent to the referring physician approximately two weeks after your study.    22. Any questions can be directed to our staff at [phone].    23. If CPAP therapy is applied, a home unit will be ordered for you through the [employer] [facility] [employer] company. You will be contacted to schedule delivery after insurance authorization.

## 2023-08-04 PROBLEM — I73.9 PERIPHERAL VASCULAR DISEASE, UNSPECIFIED (HCC): Status: ACTIVE | Noted: 2023-08-04

## 2023-08-09 ENCOUNTER — SLEEP STUDY (OUTPATIENT)
Dept: SLEEP MEDICINE | Facility: MEDICAL CENTER | Age: 83
End: 2023-08-09
Attending: FAMILY MEDICINE
Payer: MEDICARE

## 2023-08-09 DIAGNOSIS — I27.20 PULMONARY HYPERTENSION (HCC): ICD-10-CM

## 2023-08-09 DIAGNOSIS — G47.30 SLEEP APNEA, UNSPECIFIED TYPE: ICD-10-CM

## 2023-08-09 DIAGNOSIS — G47.19 EXCESSIVE DAYTIME SLEEPINESS: ICD-10-CM

## 2023-08-09 DIAGNOSIS — R09.02 HYPOXIA: ICD-10-CM

## 2023-08-09 PROCEDURE — 95810 POLYSOM 6/> YRS 4/> PARAM: CPT | Performed by: INTERNAL MEDICINE

## 2023-08-10 NOTE — PROCEDURES
Patient: SHARRON RUIZ  ID: [id number] Date: 8/9/2023 Exam No.:   MONTAGE: Standard  STUDY TYPE: Diagnostic  RECORDING TECHNIQUE:   After the scalp was prepared, gold plated electrodes were applied to the scalp according to the International 10-20 System. EEG (electroencephalogram) was continuously monitored from the O1-M2, O2-M1, C3-M2, C4-M1, F3-M2, and F4-M1. EOGs (electrooculograms) were monitored by electrodes placed at the left and right outer canthi. Chin EMG (electromyogram) was monitored by electrodes placed on the mentalis and sub-mentalis muscles. Nasal and oral airflow were monitored using a triple port thermocouple as well as oronasal pressure transducer. Respiratory effort was measured by inductive plethysmography technology employing abdominal and thoracic belts. Blood oxygen saturation and pulse were monitored by pulse oximetry. Heart rhythm was monitored by surface electrocardiogram. Leg EMG was studied using surface electrodes placed on left and right anterior tibialis. A microphone was used to monitor tracheal sounds and snoring. Body position was monitored and documented by technician observation.   SCORING CRITERIA:   A modification of the AASM manual for scoring of sleep and associated events was used. Obstructive apneas were scored by cessation of airflow for at least 10 seconds with continuing respiratory effort. Central apneas were scored by cessation of airflow for at least 10 seconds with no respiratory effort. Hypopneas were scored by a 30% or more reduction in airflow for at least 10 seconds accompanied by arterial oxygen desaturation of 3% or an arousal. For CMS (Medicare) patients, per AASM rule 1B, hypopneas are scored by 30% with mild reduction in airflow for at least 10 seconds accompanied by arterial saturation decreased at 4%.  Study start time was 10:28:55 PM. Diagnostic recording time was 7h 28.0m with a total sleep time of 5h 36.5m resulting in a sleep efficiency of 75.11%%. Sleep  latency from the start of the study was 21 minutes and the latency from sleep to REM was 00 minutes. In total,166 arousals were scored for an arousal index of 29.6.  Respiratory:  There were a total of 9 apneas consisting of 2 obstructive apneas, 0 mixed apneas, and 7 central apneas. A total of 29 hypopneas were scored. The apnea index was 1.60 per hour and the hypopnea index was 5.17 per hour resulting in an overall AHI of 6.78. AHI during REM was 0.0 and AHI while supine was 34.94.  Oximetry:  There was a mean oxygen saturation of 89.0%. The minimum oxygen saturation in NREM was 79.0 % and in REM was --%. The patient spent 153.2 minutes of TST with SaO2 <88%.  Cardiac:  The highest heart rate seen while awake was 79 BPM while the highest heart rate during sleep was 76 BPM with an average sleeping heart rate of 52 BPM.  Limb Movements:  There were a total of 74 PLMs during sleep which resulted in a PLMS index of 13.2. Of these, 34 were associated with arousals which resulted in a PLMS arousal index of 6.1.      ASSESSMENT:    Mild positional sleep apnea hypopnea - overall AHI 6.8, supine AHI 34.9, mean event duration 17.9 seconds, longest event duration 26.2 seconds  Persistent nocturnal desaturation - gideon saturation 79% - saturations less than 88% for 47.7% of the TST  Failure to meet the split-night protocol secondary to an insufficient number of qualifying respiratory events before 2 AM.            RECOMMENDATION:    If significant signs, symptoms, and or comorbidities warrant, recommend a positive airway pressure titration. Alternative treatments for OSAH include behavioral modification, use of a dental appliance, and nasopharyngeal reconstructive surgery. Behavior modification includes weight loss, eliminating alcohol and sedatives, and avoiding the supine position. If alternative treatments are used, a follow-up diagnostic study is warranted to ensure efficacy.  Clinical correlation is needed.  An empiric  trial of auto titrating CPAP may be an acceptable option.  Given the nocturnal hypoxia out of proportion to the sleep apnea, favor a dedicated Pap titration.        NOTES:     The AHI is the number of apneas (cessation of airflow for 10 seconds or longer) and hypopneas (shallow breaths accompanied by at least a 3% drop in the oxygen saturation) that occur per hour. Less than 5 per hour is normal, 5-15 per hour is mild sleep apnea, 15-30 per hour moderate sleep apnea, and greater than 30 per hour severe sleep apnea.    Patients with sleep apnea are at increased risk of cardiovascular disease including systemic arterial hypertension, pulmonary arterial hypertension, (transient or fixed), transient ischemic attacks, and an elevated risk of stroke, heart attack, sudden death, or arrhythmia between the hours of 11 PM and 6 AM.  Sleep apnea patients have an increased risk of motor vehicle accidents, type 2 diabetes, gastroesophageal reflux, repetitive mechanical trauma to pharyngeal muscles with inflammation and denervation, frequent EEG arousals leading to nonrestorative sleep, excessive daytime somnolence, fatigue, depression, poor pain control, irritability, and a lower quality of life.                Dr. Lars Macario MD

## 2023-08-14 ENCOUNTER — OFFICE VISIT (OUTPATIENT)
Dept: NEUROLOGY | Facility: MEDICAL CENTER | Age: 83
End: 2023-08-14
Attending: PSYCHIATRY & NEUROLOGY
Payer: MEDICARE

## 2023-08-14 VITALS
DIASTOLIC BLOOD PRESSURE: 66 MMHG | OXYGEN SATURATION: 94 % | RESPIRATION RATE: 14 BRPM | HEIGHT: 69 IN | HEART RATE: 51 BPM | BODY MASS INDEX: 22.89 KG/M2 | WEIGHT: 154.54 LBS | SYSTOLIC BLOOD PRESSURE: 118 MMHG

## 2023-08-14 DIAGNOSIS — G31.84 AMNESTIC MCI (MILD COGNITIVE IMPAIRMENT WITH MEMORY LOSS): ICD-10-CM

## 2023-08-14 PROCEDURE — 3074F SYST BP LT 130 MM HG: CPT | Performed by: PSYCHIATRY & NEUROLOGY

## 2023-08-14 PROCEDURE — 99215 OFFICE O/P EST HI 40 MIN: CPT | Performed by: PSYCHIATRY & NEUROLOGY

## 2023-08-14 PROCEDURE — 99212 OFFICE O/P EST SF 10 MIN: CPT | Performed by: PSYCHIATRY & NEUROLOGY

## 2023-08-14 PROCEDURE — 3078F DIAST BP <80 MM HG: CPT | Performed by: PSYCHIATRY & NEUROLOGY

## 2023-08-14 ASSESSMENT — PATIENT HEALTH QUESTIONNAIRE - PHQ9: CLINICAL INTERPRETATION OF PHQ2 SCORE: 0

## 2023-08-14 ASSESSMENT — FIBROSIS 4 INDEX: FIB4 SCORE: 1.21

## 2023-08-14 NOTE — PROGRESS NOTES
"Neuro follow up:     Last me in May 2022.    Reason: Amnestic Mild Cognitive Impairment    History of present illness:      Bernie Barrientos 82 y.o. right handed woman who lives in [facility] for nearly 15 years. She is originally from [place]. Her step daughter has lived with Bernie for nearly 5 years. She was a  and a  in [place].    She is here with here Yoselyn (Step Daughter) who lives with Bernie.     Bernie has not noticed difficulty remembering specific information in conversation and this became noticeable to her about 4 years.     There has been no notice of repeating information to another person in her view nor has Bernie been told (or recalls) that her step daughter states things as \"you just said that or you just me that.\"      Bernie still does not feel that the memory disturbance(s) interfere with daily functioning. She is confident in remembering the month/year and even the day of the week (over the last 6 months or longer) and does not feel she needs to rely on the calendar all the time.    No paranoia,delusions or hallucinations in the last 3 months or so.     She very infrequently misplaces a person item in the last 6 month.    Bernie tends to repeat herself nearly every day (atleast 2 times) over the last 12 months> she is not always aware she is doing this per Yoselyn.    Yoselyn has noticed she seems \"in the present moment\" when carrying on a conversation with her family members lasting 60-90 minutes but not so much in person.    Intellectual ability seems well maintained to Yoselyn in the last 6-12 months.    Communication ability has been well maintained without clear word retrieval issues in the last 6-12 months.     There is no history of concussion(s),seizure(s) or seizure like events nor any documented events in the Problem List to suggest such an issue.     There is no history of significant or known stroke events in her adult life.     No significant alcohol use in adult " life.  Very remote smoker over 40 years ago (less than 10 years)> 1/4 pack per day.     Bernie has not had any  postural dizziness/faintness episodes in the recent months.    She denies any headache(s),visual disturbances of any sort or kind, sensory changes of the limbs or body nor any involuntary movements of the limb or body in the last 3-6 months.     ADL(s) preserved in her view including not having any accidents/incidents when driving in the last 12 months or longer nor any problems with making meals for self, shopping,cleaning her house, using her electronic devices, using her computer to create and send emails and she likes Swidjit nearly every day (1-2 days) and continues to do that.     Average sleep- 7-8 hours most nights per week most nights; occasionally will awaken at night and will urinate. Denies snoring,grunting,gasping or self arousals nor REM Sleep Behavioral issues.      Mood has been stable per Bernie and Yoselyn  and rarely has she felt depressed nor has she had any formal psychiatric evaluations for mood type issues.      Family Hx:  Father  at a young age (in his 40's)              Patient Active Problem List    Diagnosis Date Noted    BMI 23.0-23.9, adult 2023    Peripheral vascular disease, unspecified (HCC) 2023    Hypoxia 2023    Knee pain 2023    Pulmonary hypertension (HCC) 2022    Mild cognitive impairment 2022    Other fatigue 2018    Sciatica of left side 2017    Heart murmur 2017    Seasonal allergic rhinitis due to pollen 2017    Pure hypercholesterolemia 2017    Recurrent major depressive disorder, in partial remission (HCC) 09/15/2016    Acquired hypothyroidism 2015    Stage 3 chronic kidney disease (HCC) 2015    DJD (degenerative joint disease)        Past medical history:   Past Medical History:   Diagnosis Date    Anesthesia     PONV    Arthritis     bilat hip and neck surg related to arthritis,  bilateral thumbs surgery do to arthritis    Colon polyp     Colon polyps     Dental disorder     upper partial dentures    DJD (degenerative joint disease)     Heart valve disease     mitral valve prolapse    Hypothyroidism     Major depression 2013    Migraine     Postmenopausal hormone replacement therapy        Past surgical history:   Past Surgical History:   Procedure Laterality Date    COLONOSCOPY WITH POLYP  8/15/2013    Performed by Javier Gould M.D. at [facility]    HAND SURGERY  2009    right index finger joint reconstruction    HAND SURGERY      bilateral thumb reconstruction    ABDOMINAL HYSTERECTOMY TOTAL  1984    benign    APPENDECTOMY      CERVICAL FUSION POSTERIOR      C4,5,6,7 neck fusion    HIP ARTHROPLASTY TOTAL Bilateral     TONSILLECTOMY  as child         Social history:   Social History     Socioeconomic History    Marital status:      Spouse name: Not on file    Number of children: Not on file    Years of education: Not on file    Highest education level: Not on file   Occupational History    Not on file   Tobacco Use    Smoking status: Former     Packs/day: 1.00     Years: 8.00     Pack years: 8.00     Types: Cigarettes     Quit date: 1980     Years since quittin.6    Smokeless tobacco: Never   Vaping Use    Vaping Use: Never used   Substance and Sexual Activity    Alcohol use: Yes     Alcohol/week: 1.2 oz     Types: 2 Glasses of wine per week     Comment: occ    Drug use: No    Sexual activity: Not Currently   Other Topics Concern    Not on file   Social History Narrative    Not on file     Social Determinants of Health     Financial Resource Strain: Not on file   Food Insecurity: Not on file   Transportation Needs: Not on file   Physical Activity: Not on file   Stress: Not on file   Social Connections: Not on file   Intimate Partner Violence: Not on file   Housing Stability: Not on file       Family history:   Family History   Problem  "Relation Age of Onset    Colorectal Cancer Mother     Diabetes Mother     No Known Problems Father         Passed away before the pt was born     Thyroid Sister     Diabetes Other     Heart Disease Neg Hx     Hypertension Neg Hx     Hyperlipidemia Neg Hx     Breast Cancer Neg Hx     Peritoneal Cancer Neg Hx     Ovarian Cancer Neg Hx     Tubal Cancer Neg Hx          Current medications:   Current Outpatient Medications   Medication    levothyroxine (SYNTHROID) 100 MCG Tab    sertraline (ZOLOFT) 100 MG Tab    Multiple Vitamin (MULTI VITAMIN DAILY PO)    Omega-3 Fatty Acids (OMEGA 3 PO)    vitamin D (CHOLECALCIFEROL) 1000 UNIT Tab    Magnesium 250 MG Tab    Potassium 99 MG Tab    Glucosamine HCl (GLUCOSAMINE PO)    BIOTIN 5000 PO     No current facility-administered medications for this visit.       Medication Allergy:  Allergies   Allergen Reactions    Naprosyn [Naproxen] Rash     Rash on body    Pcn [Penicillins]      Reaction when she was a child            Physical examination:   Vitals:    08/14/23 0902   BP: 118/66   BP Location: Right arm   Patient Position: Sitting   BP Cuff Size: Adult   Pulse: (!) 51   Resp: 14   SpO2: 94%   Weight: 70.1 kg (154 lb 8.7 oz)   Height: 1.753 m (5' 9\")       Normal cephalic atraumatic.  There is full range of movement around the neck in all directions without restrictions or discrete pain evoked triggers.  No lower extremity edema.      Neurological  Exam:      [place] Cognitive Assessment (MOCA) Version 7.1    Years of Education: High School Graduate    TOTAL SCORE: 19/30  (to be scanned into the MEDIA section in the E.M.R.)          Mental status: Awake, alert and fully oriented to person, place, and situation. Normal attention and concentration.  Did not appear/act combative,irritable,anxious,paranoid/delusional or aggressive to or with me.    Speech and language: Speech is fluent without errors, clear, intact to repetition, and intact to naming.     Follows 3 step motor " commands in sequence without significant delay and correctly.    Cranial nerve exam:  II: Pupils are equally round and reactive to light. Visual fields are intact by confrontation.  III, IV, VI: EOMI, no diplopia, no ptosis.  V: Sensation to light touch is normal over V1-3 distributions bilaterally.  .  VII: Facial movements are symmetrical. There is no facial droop. .  VIII: Hearing intact to soft speech and finger rub bilaterally  IX: Palate elevates symmetrically, uvula is midline. Dysarthria is not present.  XI: Shoulder shrug are symmetrical and strong.   XII: Tongue protrudes midline.      Motor exam:  Muscle tone is normal in all 4 limbs. and No abnormal movements appreciated.    Muscle strength:    Neck Flexors/Extensors: 5/5       Right  Left  Deltoid   5/5  5/5      Biceps   5/5  5/5  Triceps              5/5  5/5   Wrist extensors 5/5  5/5  Wrist flexors  5/5  5/5     5/5  5/5  Interossei  5/5  5/5  Thenar (APB)  5/5  5/5   Hip flexors  5/5  5/5  Quadriceps  5/5  5/5    Hamstrings  5/5  5/5  Dorsiflexors  5/5  5/5  Plantarflexors  5/5  5/5  Toe extension  5/5  5/5      Sensory exam:    Vibratory: 8 seconds at great toes and ankles and symmetrical    Proprioception: normal at great toes.      Reflexes:       Right  Left  Biceps   2/4  2/4  Triceps              2/4  2/4  Brachioradialis    2/4  2/4  Knee jerk  2/4  2/4  Ankle jerk  2/4  2/4     Frontal release signs are absent    bilaterally toes are downgoing to plantar stimulation..    Coordination (finger-to-nose, heel/knee/shin, rapid alternating movements) was normal.     There was no ataxia, no tremors, and no dysmetria.     Station and gait >> easily stands up from exam chair without retropulsion,veering,leaning,swaying (to either side).       Labs and Tests:     Ref Range & Units 1 mo ago  (6/26/23) 1 yr ago  (7/25/22) 1 yr ago  (5/3/22) 2 yr ago  (7/28/21) 3 yr ago  (7/27/20) 4 yr ago  (4/29/19) 5 yr ago  (4/14/18)   Sodium 135 - 145 mmol/L 141   138  144  141  137  138  138    Potassium 3.6 - 5.5 mmol/L 4.9  5.3  4.8  4.6  4.4  4.3  4.4    Chloride 96 - 112 mmol/L 104  103  108  105  104  103  105    Co2 20 - 33 mmol/L 24  25  23  25  21  25  27    Anion Gap 7.0 - 16.0 13.0  10.0  13.0  11.0  12.0  10.0 R  6.0 R    Glucose 65 - 99 mg/dL 62 Low   91  86  95  103 High   105 High   98    Bun 8 - 22 mg/dL 28 High   19  19  24 High   16  20  16    Creatinine 0.50 - 1.40 mg/dL 1.20  1.00  1.14  1.03  1.00  1.08  0.93    Calcium 8.5 - 10.5 mg/dL 9.5  9.4  9.6  9.0  9.3  9.6  9.9    AST(SGOT) 12 - 45 U/L 15  20   18  15   16    ALT(SGPT) 2 - 50 U/L 16  16   13  12   11    Alkaline Phosphatase 30 - 99 U/L 67  61   55  54   47    Total Bilirubin 0.1 - 1.5 mg/dL 0.3  0.4   0.3  0.3   0.4    Albumin 3.2 - 4.9 g/dL 4.5  4.2   4.4  4.4   3.9    Total Protein 6.0 - 8.2 g/dL 6.9  6.4   6.5  6.5   6.6    Globulin 1.9 - 3.5 g/dL 2.4  2.2   2.1  2.1   2.7    A-G Ratio g/dL 1.9  1.9   2          1 Patient Communication          Component Ref Range & Units 9 mo ago 2 yr ago 5 yr ago 6 yr ago   25-Hydroxy   Vitamin D 25 30 - 100 ng/mL 58  56 CM  36 CM  33 CM           (Kendra Diaz M.D.)     Dx: Amnestic MCI (mild cognitive impairme...     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 At risk for Dementia based on time line and very gradual changes over time.     MOCA score abnormal- 22 today.    FAQ score today of 10 with 3 (2's)> writing checks,paying bills,balancing checkbook,  "assembling tax records, keeping track of current events.    Global Deterioration Score today per Yoselyn in the  3 to 4 range.     Based on Bernie's history and information can not confirm or conclude at this time she has a dementia.    There is no evidence for parkinsonism at this time.    Today, I reviewed the clinical criteria and reviewed several  scenarios of the differences being using the medical terms of normal brain aging (age associated memory impairment),  Mild Cognitive Impairment (MCI) and Dementia.    MCI is a syndrome but statistically and for the majority of patients  occurs due  to a more rapid aging of the brain tissue or potentially from injury to certain parts of one's brain ( often from such contributing factors as  the cumulative effects of alcohol, from one or more ischemic or hemmorhagic stroke(s), from neurodegeneration or long standing with/without suboptimally controlled Hypertension). It is for some of these potential factors as to why I recommend a brain imaging test (Head CT or Brain MRI) be done for the 1st time or in certain circumstances repeated for comparison purposes  as such imaging can suggest one or more factors as to the reason(s) for the person's cognitive/memory changes. In fact, a normal or \"age related\" finding on a brain imaging test in and of itself is useful clinical and objective information to have in the evaluation of a person who has either an acute, evolving or even chronic (months to years) long cognitive/memory complaint.    Additional factors or contributors to Brain Health issues can be summarized in several books/references which I discussed as well today.     Goals going forwards include:    A. Paying attention to one's risk factors and reducing their prevalence or potential impact on one's changing memory/thinking> an excellent example would be to stop smoking, reduce or eliminate alcohol use (depending on the amount and frequency of usage), maintain good blood " pressure control by buying a digital arm blood pressure cuff such as an OMRON Series 3 or 5 and checking one's blood pressure atleast 3 days per week (in the am and early afternoon) that the numbers are under 140/90 and working as needed with the primary care doctor  to optimize blood pressure control).      B. Encouraging proper sleep hygiene which for most adults is 7 to 8 hours of uninterrupted sleep per night.      C. Encouraging some form of exercise preferable aerobic forms to be done (4 to 5 days per week- 30 minutes minimum per day)> 150 minutes per week as a goal. Example activities could include fast walking (up a slight incline), jogging, cycling (road or stationary), swimming,tennis. Essentially, even basic walking on a flat surface or a treadmill would be better than doing nothing.    D. Maintaining or forming new social contacts with family and friends  and a positive attitude despite the concerns and/or ongoing issues with thinking and/or memory.    E. Eating well which means a diet low in salt  (under 2 grams per day), sugar and saturated fat.    F. Maintaining one's BMI (Body Mass Index) under 30.    G.  I will be ordering a Brain MRI for Bernie to evaluate the structure of her brain as this was not done after our prior evaluation last year.    H.  I am ordering some additional blood work (Vitamin levels, BMP, TSH level)      I. Formal Neuropsychological testing will be arranged with our Neuropsychologist at [facility] (Dr. Wisdom)> expected time for this appointment is about 5-6 from now.    J. Brain Healthy Activities reviewed today> importance of eating well( such as the MIND Diet or similar nutritional strategy with low processed sugar content )  and importance of exercise.    K. We reviewed the anti amyloid medications that have been recently released to the market. I gave Bernie and Yoselyn some information on these issues. At this time they would not want to pursue such IV medication(s).    L.  We  discussed the options of a BRAIN PET Scan which  at this time she does not want to pursue (I feel this is elective).    M. See no reason at this time for an EEG.      I have performed  a history and physical exam and a directed /focused  ROS today.    Total time spent today or this patient's care was 54 minutes   and included reviewing  the diagnostic workup to date (such as labs and imaging as well as interpreting such tests relevant to this patient's neurological condition),  reviewing/obtaining separately obtained history (from patient and/or accompanying ffamily member)  for today's neurological problem(s) ,counseling and educating the patient and family member on issues related to cognition/memory and cognitive health factors and documenting  the clinical information in the EMR.    Follow up in about 6-7 months or so.        Jonas Deng MD  [employer] [facility] [employer].   [facility]

## 2023-08-16 ENCOUNTER — TELEPHONE (OUTPATIENT)
Dept: NEUROLOGY | Facility: MEDICAL CENTER | Age: 83
End: 2023-08-16
Payer: MEDICARE

## 2023-08-16 NOTE — TELEPHONE ENCOUNTER
[phone]  Pt called a few times asking about some hand written notes she states gave to dr Deng.   Previously asked dr Deng and stated no handwritten notes were handed to him from Yoselyn person that came with pt.  Yoselyn is demanding to speak with dr Deng because she stated she know she gave those notes to him, and need to speak with him personally.

## 2023-09-01 ENCOUNTER — OCCUPATIONAL THERAPY (OUTPATIENT)
Dept: OCCUPATIONAL THERAPY | Facility: REHABILITATION | Age: 83
End: 2023-09-01
Attending: PSYCHIATRY & NEUROLOGY
Payer: MEDICARE

## 2023-09-01 DIAGNOSIS — G31.84 AMNESTIC MCI (MILD COGNITIVE IMPAIRMENT WITH MEMORY LOSS): ICD-10-CM

## 2023-09-01 PROCEDURE — 97750 PHYSICAL PERFORMANCE TEST: CPT

## 2023-09-01 ASSESSMENT — BALANCE ASSESSMENTS
BALANCE - SITTING STATIC: NORMAL
BALANCE - SITTING DYNAMIC: NORMAL
BALANCE - STANDING DYNAMIC: GOOD
BALANCE - STANDING STATIC: NORMAL
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
## 2023-09-07 ENCOUNTER — HOSPITAL ENCOUNTER (OUTPATIENT)
Dept: RADIOLOGY | Facility: MEDICAL CENTER | Age: 83
End: 2023-09-07
Attending: PSYCHIATRY & NEUROLOGY
Payer: MEDICARE

## 2023-09-07 DIAGNOSIS — G31.84 AMNESTIC MCI (MILD COGNITIVE IMPAIRMENT WITH MEMORY LOSS): ICD-10-CM

## 2023-09-07 PROCEDURE — 70551 MRI BRAIN STEM W/O DYE: CPT

## 2023-09-11 ENCOUNTER — TELEPHONE (OUTPATIENT)
Dept: NEUROLOGY | Facility: MEDICAL CENTER | Age: 83
End: 2023-09-11
Payer: MEDICARE

## 2023-09-11 NOTE — TELEPHONE ENCOUNTER
"Called spoke with Yoselyn, let her know. She will check.      Jonas Deng M.D.  Naomi Kra[facility]       This woman has not yet read my email to her via MY Chart.     Please let her know that she needs to read it.     It is below too:     --------------------------------------------------------------------   Bernie       Your Brain MRI  showed some mild changes (abnormalities)  deep within the brain's white matter tracts which the radiologist characterizes as \"microvascular changes\"- these findings are not uncommonly found on patients' brain MRI over the age of 65.       What are referred to as \"microvascular changes\" are often and commonly seen with an aging brain but can also be noticed in  patients who have risk factors for vascular disease (whether that be a history of ongoing smoking, diabetes, high cholesterol and especially High Blood Pressure (ie, Hypertension).       The theory behind \"microvascular changes\" as seen either on a Head CT scan or a Brain MRI is that the smallest blood vessels deep within the brain have essentially closed up and left a \"scar\" area.       Even if a person has a known diagnosis of High Blood Pressure and even if he/she is taking medication(s) to control one's high blood pressure, whenever there are \"microvascular changes\" observed on a Brain MRI, I strongly advocate and recommend that the patient self monitor his/her blood pressures at home using a digital upper arm blood pressure cuff.       If you do not have a working or relative new digital upper arm blood pressure cuff, I would recommend purchasing a digital upper arm blood pressure cuff from the company called [employer].       More specifically, an [employer] Series 3 or Series 5 digital upperarm blood pressure cuff.         You can buy an OMRON Series 3 or Series 5 Ddigital upper arm blood pressure cuff online ([employer]) or at most pharmacies.       The cost for one of these excellent digital upper arm blood " "pressure cuffs is under $60.00 and well worth the investment.       I'd recommend checking your blood pressure in the morning (within 1 hour upon getting up and then in the early afternoon) for 2 straight weeks and then after that period of time 2 or 3 days a week (morning and early afternoon).       Your goal blood pressure should be under 130/80 so if more than 2 blood pressures over this 2 week period is over 140/90 OR if any blood pressure is over 150/90, you should   Contact your primary care doctor to advise  her  of these results.       One of the most important factors in reducing cognitive decline and one's risk for a  future transient ischemic attack (or Stroke) is to ensure that one's blood pressure is well controlled over a long period of time. The more that the blood pressure is elevated over the 130/80 range, the higher the likelihood of developing more of the \"microvascular changes\" within the brain matter.       Jonas Deng MD   [facility]   Neurology    "

## 2023-09-15 ENCOUNTER — APPOINTMENT (OUTPATIENT)
Dept: OCCUPATIONAL THERAPY | Facility: REHABILITATION | Age: 83
End: 2023-09-15
Attending: PSYCHIATRY & NEUROLOGY
Payer: MEDICARE

## 2023-09-28 ENCOUNTER — APPOINTMENT (OUTPATIENT)
Dept: MEDICAL GROUP | Facility: PHYSICIAN GROUP | Age: 83
End: 2023-09-28
Payer: MEDICARE

## 2023-09-28 ENCOUNTER — OFFICE VISIT (OUTPATIENT)
Dept: MEDICAL GROUP | Facility: PHYSICIAN GROUP | Age: 83
End: 2023-09-28
Payer: MEDICARE

## 2023-09-28 VITALS
WEIGHT: 154 LBS | DIASTOLIC BLOOD PRESSURE: 62 MMHG | HEIGHT: 69 IN | HEART RATE: 60 BPM | SYSTOLIC BLOOD PRESSURE: 120 MMHG | OXYGEN SATURATION: 95 % | BODY MASS INDEX: 22.81 KG/M2 | TEMPERATURE: 97.8 F

## 2023-09-28 DIAGNOSIS — G47.33 SLEEP APNEA, OBSTRUCTIVE: ICD-10-CM

## 2023-09-28 DIAGNOSIS — Z23 NEED FOR VACCINATION: ICD-10-CM

## 2023-09-28 PROCEDURE — 99214 OFFICE O/P EST MOD 30 MIN: CPT | Mod: 25

## 2023-09-28 PROCEDURE — G0008 ADMIN INFLUENZA VIRUS VAC: HCPCS

## 2023-09-28 PROCEDURE — 90662 IIV NO PRSV INCREASED AG IM: CPT

## 2023-09-28 PROCEDURE — 3078F DIAST BP <80 MM HG: CPT

## 2023-09-28 PROCEDURE — 3074F SYST BP LT 130 MM HG: CPT

## 2023-09-28 ASSESSMENT — ENCOUNTER SYMPTOMS
WEIGHT LOSS: 0
ABDOMINAL PAIN: 0
DIZZINESS: 0
DIARRHEA: 0
SHORTNESS OF BREATH: 0
MYALGIAS: 0
BLURRED VISION: 0
HEADACHES: 0
COUGH: 0
FEVER: 0
CONSTIPATION: 0
CHILLS: 0
WEAKNESS: 0
NAUSEA: 0
VOMITING: 0

## 2023-09-28 ASSESSMENT — FIBROSIS 4 INDEX: FIB4 SCORE: 1.22

## 2023-09-29 NOTE — ASSESSMENT & PLAN NOTE
New diagnosis for patient after recent sleep study- Likely chronic- uncontrolled, needing CPAP.  - Overall study does show persistent nocturnal desaturation down to 79% and she was less than 88% over 47.7% of the test.  She also was found to have multiple episodes of central sleep apnea as well as obstructive sleep apnea.  It is recommended to have either titration study or patient be set up with apnea device.  At this time she is considering titration study.  -DME placed for CPAP 5-15 cm and supplies sent to: [facility]  -Recommend follow up in 3 months with PCP to document usage and quality of life  -Follow with Pulmonary medicine on 3/8/2024 with Dr Philip as scheduled

## 2023-09-29 NOTE — PROGRESS NOTES
"Subjective:     CC: follow-up    HPI:   Bernie presents today with    Problem   Sleep Apnea, Obstructive    New diagnosis-   Sleep study done on 8/9/2023 shows Mild positional sleep apnea hypopnea - overall AHI 6.8, supine AHI 34.9, mean event duration 17.9 seconds, longest event duration 26.2 seconds  Persistent nocturnal desaturation - gideon saturation 79% - saturations less than 88% for 47.7% of the TST  Failure to meet the split-night protocol secondary to an insufficient number of qualifying respiratory events before 2 AM.   - If significant signs, symptoms, and or comorbidities warrant, recommend a positive airway pressure titration.          Health Maintenance: Completed    ROS:  Review of Systems   Constitutional:  Negative for chills, fever, malaise/fatigue and weight loss.   Eyes:  Negative for blurred vision.   Respiratory:  Negative for cough and shortness of breath.    Cardiovascular:  Negative for chest pain.   Gastrointestinal:  Negative for abdominal pain, constipation, diarrhea, nausea and vomiting.   Musculoskeletal:  Negative for myalgias.   Neurological:  Negative for dizziness, weakness and headaches.       Objective:     Exam:  /62 (BP Location: Left arm, Patient Position: Sitting, BP Cuff Size: Adult)   Pulse 60   Temp 36.6 °C (97.8 °F)   Ht 1.753 m (5' 9\")   Wt 69.9 kg (154 lb)   SpO2 95%   BMI 22.74 kg/m²  Body mass index is 22.74 kg/m².    Physical Exam  Constitutional:       General: She is not in acute distress.     Appearance: Normal appearance. She is not ill-appearing or toxic-appearing.   HENT:      Head: Normocephalic.   Eyes:      Conjunctiva/sclera: Conjunctivae normal.   Cardiovascular:      Rate and Rhythm: Normal rate and regular rhythm.      Pulses: Normal pulses.      Heart sounds: Normal heart sounds. No murmur heard.  Pulmonary:      Effort: Pulmonary effort is normal. No respiratory distress.      Breath sounds: Normal breath sounds.   Skin:     General: Skin is " "warm and dry.   Neurological:      General: No focal deficit present.      Mental Status: She is alert and oriented to person, place, and time.   Psychiatric:         Mood and Affect: Mood normal.         Behavior: Behavior normal.         Labs:   Lab Results   Component Value Date/Time    CHOLSTRLTOT 185 07/28/2021 09:35 AM     (H) 07/28/2021 09:35 AM    HDL 60 07/28/2021 09:35 AM    TRIGLYCERIDE 74 07/28/2021 09:35 AM       Lab Results   Component Value Date/Time    SODIUM 141 06/26/2023 02:36 PM    POTASSIUM 4.9 06/26/2023 02:36 PM    CHLORIDE 104 06/26/2023 02:36 PM    CO2 24 06/26/2023 02:36 PM    GLUCOSE 62 (L) 06/26/2023 02:36 PM    BUN 28 (H) 06/26/2023 02:36 PM    CREATININE 1.20 06/26/2023 02:36 PM    CREATININE 1.2 02/26/2008 02:48 PM     Lab Results   Component Value Date/Time    ALKPHOSPHAT 67 06/26/2023 02:36 PM    ASTSGOT 15 06/26/2023 02:36 PM    ALTSGPT 16 06/26/2023 02:36 PM    TBILIRUBIN 0.3 06/26/2023 02:36 PM      Lab Results   Component Value Date/Time    HBA1C 5.9 (H) 07/18/2013 08:24 AM     Lab Results   Component Value Date/Time    TSH 0.959 11/02/2012 12:00 AM    TSH 1.710 01/28/2010 09:05 AM     Lab Results   Component Value Date/Time    FREET4 0.99 05/06/2013 11:13 AM    FREET4 1.04 07/21/2010 04:45 PM     No results found for: \"CBC\"      Assessment & Plan: Medical Decision Making     83 y.o. female with the following -     Problem List Items Addressed This Visit       Sleep apnea, obstructive     New diagnosis for patient after recent sleep study- Likely chronic- uncontrolled, needing CPAP.  - Overall study does show persistent nocturnal desaturation down to 79% and she was less than 88% over 47.7% of the test.  She also was found to have multiple episodes of central sleep apnea as well as obstructive sleep apnea.  It is recommended to have either titration study or patient be set up with apnea device.  At this time she is considering titration study.  -DME placed for CPAP 5-15 cm " and supplies sent to: [facility]  -Recommend follow up in 3 months with PCP to document usage and quality of life  -Follow with Pulmonary medicine on 3/8/2024 with Dr Philip as scheduled         Relevant Orders    DME CPAP     Other Visit Diagnoses       Need for vaccination        Relevant Orders    INFLUENZA VACCINE, HIGH DOSE (65+ ONLY) (Completed)            Differential diagnosis, natural history, supportive care, and indications for immediate follow-up discussed.  Shared decision making approach utilized, and patient is amendable with plan of care.  Patient understands to return to clinic or go to the emergency department if symptoms worsen. All questions and concerns addressed to the best of my knowledge.    Return in 3 months (on 12/28/2023) for Sleep Apnea.    Please note that this dictation was created using voice recognition software. I have made every reasonable attempt to correct obvious errors, but I expect that there are errors of grammar and possibly content that I did not discover before finalizing the note.

## 2023-11-17 ENCOUNTER — TELEPHONE (OUTPATIENT)
Dept: NEUROLOGY | Facility: MEDICAL CENTER | Age: 83
End: 2023-11-17

## 2023-11-17 ENCOUNTER — APPOINTMENT (OUTPATIENT)
Dept: MEDICAL GROUP | Facility: PHYSICIAN GROUP | Age: 83
End: 2023-11-17
Payer: MEDICARE
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
## 2023-12-14 ENCOUNTER — OFFICE VISIT (OUTPATIENT)
Dept: NEUROLOGY | Facility: MEDICAL CENTER | Age: 83
End: 2023-12-14
Attending: CLINICAL NEUROPSYCHOLOGIST
Payer: MEDICARE

## 2023-12-14 DIAGNOSIS — G30.1 MILD LATE ONSET ALZHEIMER'S DEMENTIA WITH AGITATION (HCC): ICD-10-CM

## 2023-12-14 DIAGNOSIS — G30.9 ALZHEIMER'S DISEASE (HCC): ICD-10-CM

## 2023-12-14 DIAGNOSIS — F02.A11 MILD LATE ONSET ALZHEIMER'S DEMENTIA WITH AGITATION (HCC): ICD-10-CM

## 2023-12-14 DIAGNOSIS — F02.80 ALZHEIMER'S DISEASE (HCC): ICD-10-CM

## 2023-12-14 PROCEDURE — 96116 NUBHVL XM PHYS/QHP 1ST HR: CPT | Performed by: CLINICAL NEUROPSYCHOLOGIST

## 2023-12-14 PROCEDURE — 96137 PSYCL/NRPSYC TST PHY/QHP EA: CPT | Performed by: CLINICAL NEUROPSYCHOLOGIST

## 2023-12-14 PROCEDURE — 96136 PSYCL/NRPSYC TST PHY/QHP 1ST: CPT | Performed by: CLINICAL NEUROPSYCHOLOGIST

## 2023-12-14 NOTE — PROGRESS NOTES
Neurobehavioral Status Exam and Neuropsychological Testing    Patient name: Bernie Barrientos  Referral source: Jonas Deng M.D.   MRN: [id number]  Evaluation date: 12/14/2023   YOB: 1940  Neuropsychologist: Sweta Wisdom, Ph.D.         This evaluation was conducted for clinical treatment planning and may not be valid for other purposes. Potential risks and benefits, limits of confidentiality, and test procedures were discussed. Following this discussion, the patient consented to complete the evaluation. Information for this section was obtained from the medical record, neuropsychological testing, and a clinical interview with the patient and her stepdaughter conducted on 12/14/2023. Information included in this section is intended as a reference and should not be interpreted in the absence of the complete neuropsychological report. Please refer to the neuropsychological report for additional information including test results, impressions, and recommendations.     Background and Referral Information: The patient is an 83-year-old, , right-handed, non- White, female, retired , with 12 years of education who has experienced cognitive decline in the context of a previous diagnosis of amnestic mild cognitive impairment (MCI). Dr. Deng referred the patient for a neuropsychological evaluation to characterize her cognitive concerns, aid in differential diagnosis, and treatment planning. Additional medical history is relevant for hypercholesteremia, mitral valve prolapse (MVP), unspecified peripheral vascular disease (PVD), pulmonary hypertension, hypoxia, mild postural sleep apnea (untreated), knee pain, arthritis, degenerative joint disease, sciatica of left side, hypothyroidism, migraine, and stage III chronic kidney disease (CKD).     Presenting Concerns Summary: The patient denied significant cognitive problems and attributed her mild memory difficulties to normal aging. She  exhibited limited insight into her cognitive and emotional functioning. Most of the information during the clinical interview was provided by her stepdaughter. Her stepdaughter noted observing progressive short-term memory decline for the past 3 years. Additionally, she reported observing difficulties with attention and aspects of executive function. Her stepdaughter lives with the patient. The patient reportedly has difficulties with instrumental activities of daily living (ADLs; e.g., financial management, scheduling appointments, and household tasks) and receives assistance from her stepdaughter. The patient remains independent with basic ADLs. She denied significant emotional distress and on self-report measures. During the clinical interview, she reported mild mood disturbance that is not significantly interfering with daily functioning. Her stepdaughter reported observing significant neuropsychiatric symptoms including irritability, agitation, suspiciousness, and apathy. She also noted observing daytime fatigue and mild hearing loss.    Patient Active Problem List   Diagnosis    DJD (degenerative joint disease)    Acquired hypothyroidism    Stage 3 chronic kidney disease (HCC)    Recurrent major depressive disorder, in partial remission (HCC)    Seasonal allergic rhinitis due to pollen    Pure hypercholesterolemia    Sciatica of left side    Heart murmur    Other fatigue    Mild cognitive impairment    Pulmonary hypertension (HCC)    Hypoxia    Knee pain    BMI 23.0-23.9, adult    Peripheral vascular disease, unspecified (HCC)    Sleep apnea, obstructive     Past Medical History:   Diagnosis Date    Anesthesia     PONV    Arthritis 2003    bilat hip and neck surg related to arthritis, bilateral thumbs surgery do to arthritis    Colon polyp     Colon polyps     Dental disorder     upper partial dentures    DJD (degenerative joint disease)     Heart valve disease 2005    mitral valve prolapse    Hypothyroidism      Major depression 5/2/2013    Migraine     Postmenopausal hormone replacement therapy       Past Surgical History:   Procedure Laterality Date    COLONOSCOPY WITH POLYP  8/15/2013    Performed by Javier Gould M.D. at SURGERY [facility]    HAND SURGERY  2009    right index finger joint reconstruction    HAND SURGERY  2004    bilateral thumb reconstruction    ABDOMINAL HYSTERECTOMY TOTAL  1984    benign    APPENDECTOMY  1957    CERVICAL FUSION POSTERIOR      C4,5,6,7 neck fusion    HIP ARTHROPLASTY TOTAL Bilateral     TONSILLECTOMY  as child      Family History   Problem Relation Age of Onset    Colorectal Cancer Mother     Diabetes Mother     No Known Problems Father         Passed away before the pt was born     Thyroid Sister     Diabetes Other     Heart Disease Neg Hx     Hypertension Neg Hx     Hyperlipidemia Neg Hx     Breast Cancer Neg Hx     Peritoneal Cancer Neg Hx     Ovarian Cancer Neg Hx     Tubal Cancer Neg Hx         Current Outpatient Medications:     levothyroxine (SYNTHROID) 100 MCG Tab, Take 1 tablet by mouth once daily, Disp: 90 Tablet, Rfl: 1    sertraline (ZOLOFT) 100 MG Tab, Take 2 Tablets by mouth every day., Disp: 180 Tablet, Rfl: 3    Multiple Vitamin (MULTI VITAMIN DAILY PO), Take 1 Tab by mouth every day., Disp: , Rfl:     Omega-3 Fatty Acids (OMEGA 3 PO), Take 1 Cap by mouth every day., Disp: , Rfl:     vitamin D (CHOLECALCIFEROL) 1000 UNIT Tab, Take 1,000 Units by mouth every day., Disp: , Rfl:     BIOTIN 5000 PO, Take 1 Tablet by mouth every day., Disp: , Rfl:     Magnesium 250 MG Tab, Take 1 Tab by mouth every day., Disp: , Rfl:     Potassium 99 MG Tab, Take 1 Tab by mouth every day., Disp: , Rfl:     Glucosamine HCl (GLUCOSAMINE PO), Take 1 Tab by mouth every day., Disp: , Rfl:       Social History     Tobacco Use    Smoking status: Former     Current packs/day: 0.00     Average packs/day: 1 pack/day for 8.0 years (8.0 ttl pk-yrs)     Types: Cigarettes     Start date:  1972     Quit date: 1980     Years since quittin.9    Smokeless tobacco: Never   Vaping Use    Vaping Use: Never used   Substance and Sexual Activity    Alcohol use: Yes     Alcohol/week: 1.2 oz     Types: 2 Glasses of wine per week     Comment: occ    Drug use: No    Sexual activity: Not Currently     Measures Administered: [place] Naming Test (BNT); Brief Visuospatial Memory Test - Revised (BVMT-R); Erlinda-Tripp Executive Functioning System (DKEFS): Color-Word Interference (CWI) & Verbal Fluency (VF); Executive Clock Drawing Test (CLOX); Generalized Anxiety Disorder 7 Item Scale (ALYSSA-7); Barraza Verbal Learning Test - Revised (HVLT-R); Mini-Mental State Examination - 2nd Ed. Orientation (MMSE-2); Patient Health Questionnaire (PHQ-9); Performance Validity Tests;  Repeatable Battery for the Assessment of Neuropsychological Status Line Orientation (RBANS LO); Test of Practical Judgment (TOPJ); Test of Premorbid Functioning (TOPF); Trail Making Test A & B (TMT); Wechsler Adult Intelligence Scale - 4th Ed. (WAIS-IV): Block Design (BD), Digit Span (DS), Matrix Reasoning (MR), Similarities (SI), Vocabulary (VC), Information (IN), & Coding (CD); and Wechsler Memory Scale - 4th Ed. Logical Memory (WMS-IV LM). Informant Questionnaires: Activities of Daily Living Questionnaire (ADLQ) and Neuropsychiatric Inventory Questionnaire (NPI-Q).     Behavioral Observations: The patient arrived at the clinic on time and was accompanied by her stepdaughter, who participated in the clinical interview. She was well groomed and dressed. She was alert and fully oriented to situation and person, but incompletely oriented to place and time (MMSE-2 Orientation = 4/10; incorrect year, day of the week, date, county, name of building, and building floor). She was polite and always maintained appropriate interpersonal boundaries. Rapport was easily established. Gait was unremarkable. No gross abnormal movements or motor symptoms were  observed. She exhibited limited insight into her cognitive and emotional functioning. Most of the information regarding the patient's cognitive problems was provided by her stepdaughter during the clinical interview. The patient rapidly forgot previously asked questions and topics discussed during the interview. She also had difficulty recalling some autobiographical information (e.g., when she retired). Speech rate, volume, articulation, and prosody were normal. Speech content was logical and appropriate to context. Expressive and receptive language were intact during conversation. Mood was euthymic during the appointment. Affect was mood-congruent and appropriate to the situation. There was no indication of hallucinations, delusions, or thought disorder. Vision (corrected with glasses) and hearing were adequate for the evaluation. She was attentive throughout the evaluation and had no difficulties with retention of task demands. Response style was unremarkable. On the freehand clock drawing task (CLOX1), she lost points for not drawing the clock hands as arrows, incorrect placement of the hour hand, drawing the minute hand longer, and a stimulus bound errors. Overall, she was engaged and cooperative throughout testing.       Sweta Wisdom, Ph.D.          Clinical Neuropsychologist          Department of Neurology          [facility]             Fifty-eight minutes were spent interviewing the patient (neurobehavioral status exam: 53359 = 1). Test administration and scoring were completed in 4 hours and 21 minutes (39568 = 1, 20204 = 8).      [place]

## 2023-12-14 NOTE — PATIENT INSTRUCTIONS
Thank you for your effort during today's evaluation. We will have a feedback session to discuss your results on 01/15/2023 at 9 AM.

## 2024-01-15 ENCOUNTER — OFFICE VISIT (OUTPATIENT)
Dept: NEUROLOGY | Facility: MEDICAL CENTER | Age: 84
End: 2024-01-15
Attending: CLINICAL NEUROPSYCHOLOGIST
Payer: MEDICARE

## 2024-01-15 DIAGNOSIS — G30.1 MILD LATE ONSET ALZHEIMER'S DEMENTIA WITH AGITATION (HCC): ICD-10-CM

## 2024-01-15 DIAGNOSIS — F02.80 ALZHEIMER'S DISEASE (HCC): ICD-10-CM

## 2024-01-15 DIAGNOSIS — G30.9 ALZHEIMER'S DISEASE (HCC): ICD-10-CM

## 2024-01-15 DIAGNOSIS — F02.A11 MILD LATE ONSET ALZHEIMER'S DEMENTIA WITH AGITATION (HCC): ICD-10-CM

## 2024-01-15 PROCEDURE — 96133 NRPSYC TST EVAL PHYS/QHP EA: CPT | Performed by: CLINICAL NEUROPSYCHOLOGIST

## 2024-01-15 PROCEDURE — 96132 NRPSYC TST EVAL PHYS/QHP 1ST: CPT | Performed by: CLINICAL NEUROPSYCHOLOGIST

## 2024-01-15 ASSESSMENT — PATIENT HEALTH QUESTIONNAIRE - PHQ9
5. POOR APPETITE OR OVEREATING: 0 - NOT AT ALL
CLINICAL INTERPRETATION OF PHQ2 SCORE: 1
SUM OF ALL RESPONSES TO PHQ QUESTIONS 1-9: 2

## 2024-01-15 NOTE — PROGRESS NOTES
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 stepdaughter, who participated in the clinical interview. She was well groomed and dressed. She was alert and fully oriented to situation and person, but incompletely oriented to place and time (MMSE-2 Orientation = 4/10; incorrect year, day of the week, date, county, name of building, and building floor). She was polite and always maintained appropriate interpersonal boundaries. Rapport was easily established. Gait was unremarkable. No gross abnormal movements or motor symptoms were observed. She exhibited limited insight into her cognitive and emotional functioning. Most of the information regarding the patient's cognitive problems was provided by her stepdaughter during the clinical interview. The patient rapidly forgot previously asked questions and topics discussed during the interview. She also had difficulty recalling some autobiographical information (e.g., when she retired). Speech rate, volume, articulation, and prosody were normal. Speech content was logical and appropriate to context. Expressive and receptive language were intact during conversation. Mood was euthymic during the appointment. Affect was mood-congruent and appropriate to the situation. There was no  indication of hallucinations, delusions, or thought disorder. Vision (corrected with glasses) and hearing were adequate for the evaluation. She was attentive throughout the evaluation and had no difficulties with retention of task demands. Response style was unremarkable. On the freehand clock drawing task (CLOX1), she lost points for not drawing the clock hands as arrows, incorrect placement of the hour hand, drawing the minute hand longer, and a stimulus bound errors. Overall, she was engaged and cooperative throughout testing.     Results: Please note that scores reported are for professional use only. For diagnostic purposes, a performance score that falls below the 9th percentile of the reference group for that measure may be considered a cognitive deficit depending on the overall pattern of performance. The following clinical descriptors identify performance within the range of percentile scores indicated in the parentheses: Exceptionally High Score (>98th), Above Average Score (91st-97th), High Average Score (75th-90th), Average Score (25th-74th), Low Average Score (9th-24th), Below Average Score (3rd-8th), and Exceptionally Low Score (<2nd). Please see the test results summary table below for a list of measures administered as well as raw and normative scores, which are listed in the designated columns. All such scores are based on age-corrected norms and certain scores may be adjusted for education and/or other demographic factors as appropriate.     Data Validity: The patient's performance on embedded and freestanding validity measures was within the valid range, suggesting she appropriately engaged in testing. The following test results are considered an accurate representation of her current level of cognitive functioning.    Test Results Summary Table:          Self-Report Questionnaires: The patient's responses on self-report inventories of emotional functioning were indicative of minimal levels of  depression (PHQ-9) and anxiety (ALYSSA-7) over the past two weeks.     Informant Questionnaires: Her stepdaughter completed a questionnaire about the patient's ability to function independently, implying a moderate level of impairment in activities of daily living, with particular difficulty in the areas of household care, shopping, financial management, and recreation (ADLQ). On a questionnaire regarding neuropsychiatric symptoms, her stepdaughter reported observing moderate appetite changes coupled with severe agitation, depression, apathy, and irritability resulting in moderate to severe caregiver distress (NPI-Q).      Thank you for allowing me to participate in the patient's care. If I can be of further assistance, please do not hesitate to contact me.      Sweta Wisdom, Ph.D.          Clinical Neuropsychologist          Department of Neurology          [facility]             This report was created using voice recognition software. I have made every reasonable attempt to avoid dictation errors, but this document may contain an error not identified before finalizing. If the error changes the accuracy of the document, I would appreciate it being brought to my attention. Thank you.    Four hours and 12 minutes were spent in clinical decision-making, chart review, records reviews, interpretation of test results and clinical data, integration of patient data, interactive feedback to the patient, and report preparation (test evaluation services: 70361 = 1, 41384 = 3).

## 2024-01-15 NOTE — PROGRESS NOTES
NEUROPSYCHOLOGICAL FEEDBACK    Name: Bernie Barrientos    MRN: [id number]   YOB: 1940   Date of Feedback: 1/15/2024  Referred by: Jonas Deng M.D.  Evaluated by: Sweta Wisdom, Ph.D.        The patient and her stepdaughter were seen for an interactive feedback session regarding the patient's neuropsychological evaluation on 12/14/2023. They reported she has been stable in terms of cognitive, functional, emotional, and physical functioning since the neuropsychological evaluation. I discussed the results of the evaluation and the recommendations in detail with the patient as well as her stepdaughter and they expressed understanding. The discussion included psychoeducation about mild cognitive impairment, different types of dementia, Alzheimer's disease, vascular dementia, and other possible contributors to the patient's cognitive decline. Psychoeducation was also provided regarding how neuropsychiatric symptoms, sleep apnea, fatigue, elevated stress, hearing problems, and chronic pain can affect cognition. Additional information was provided regarding lifestyle factors associated with maintaining brain health. Follow-up with the referring provider to discuss treatment planning was recommended. The patient and her stepdaughter were afforded time to ask questions and all their questions were addressed.    Sweta Wisdom, Ph.D.                                                                             Clinical Neuropsychologist                                                                               Department of Neurology                                                                      [facility]

## 2024-01-22 RX ORDER — LEVOTHYROXINE SODIUM 0.1 MG/1
TABLET ORAL
Qty: 90 TABLET | Refills: 0 | Status: SHIPPED | OUTPATIENT
Start: 2024-01-22

## 2024-01-22 NOTE — TELEPHONE ENCOUNTER
Received request via: Pharmacy    Was the patient seen in the last year in this department? Yes    Does the patient have an active prescription (recently filled or refills available) for medication(s) requested? No    Pharmacy Name: [facility]    Does the patient have skilled nursing Plus and need 100 day supply (blood pressure, diabetes and cholesterol meds only)? Medication is not for cholesterol, blood pressure or diabetes

## 2024-02-26 ENCOUNTER — TELEPHONE (OUTPATIENT)
Dept: NEUROLOGY | Facility: MEDICAL CENTER | Age: 84
End: 2024-02-26

## 2024-02-26 NOTE — TELEPHONE ENCOUNTER
YIFAN    Caller: ISAMAR     Topic/issue: Asking for a call back to discuss her step mom. She states it is getting very hard to take of her and they end up arguing every few days. She is just not sure how to care for her and would like some guidance.     Isamar states she left a message for Dr. Wisdom before Presidents day and still has not received a call back.        Callback Number: [phone] (home)    Thank you    Monse PICKARD

## 2024-02-28 PROBLEM — G31.84 MILD COGNITIVE IMPAIRMENT: Status: RESOLVED | Noted: 2022-01-06 | Resolved: 2024-02-28

## 2024-02-28 PROBLEM — G30.1 MILD LATE ONSET ALZHEIMER'S DEMENTIA WITH AGITATION (HCC): Status: ACTIVE | Noted: 2024-02-28

## 2024-02-28 PROBLEM — F02.A11 MILD LATE ONSET ALZHEIMER'S DEMENTIA WITH AGITATION (HCC): Status: ACTIVE | Noted: 2024-02-28

## 2024-03-01 ENCOUNTER — TELEPHONE (OUTPATIENT)
Dept: NEUROLOGY | Facility: MEDICAL CENTER | Age: 84
End: 2024-03-01
Payer: MEDICARE

## 2024-03-02 ENCOUNTER — DOCUMENTATION (OUTPATIENT)
Dept: NEUROLOGY | Facility: MEDICAL CENTER | Age: 84
End: 2024-03-02
Payer: MEDICARE

## 2024-03-02 DIAGNOSIS — G30.1 MILD LATE ONSET ALZHEIMER'S DEMENTIA WITHOUT BEHAVIORAL DISTURBANCE, PSYCHOTIC DISTURBANCE, MOOD DISTURBANCE, OR ANXIETY (HCC): ICD-10-CM

## 2024-03-02 DIAGNOSIS — F02.A0 MILD LATE ONSET ALZHEIMER'S DEMENTIA WITHOUT BEHAVIORAL DISTURBANCE, PSYCHOTIC DISTURBANCE, MOOD DISTURBANCE, OR ANXIETY (HCC): ICD-10-CM

## 2024-03-04 ENCOUNTER — PATIENT OUTREACH (OUTPATIENT)
Dept: HEALTH INFORMATION MANAGEMENT | Facility: OTHER | Age: 84
End: 2024-03-04
Payer: MEDICARE

## 2024-03-04 ENCOUNTER — HOME HEALTH ADMISSION (OUTPATIENT)
Dept: HOME HEALTH SERVICES | Facility: HOME HEALTHCARE | Age: 84
End: 2024-03-04
Payer: MEDICARE

## 2024-03-04 NOTE — PROGRESS NOTES
Outpatient Social Work Follow-Up    Synopsis: CCM referral placed by neurology- The patient's stepdaughter, Yoselyn, is having a difficult time managing the patient's behavioral changes due to Alzheimer's disease. I gave her resources for caregivers, but she requested additional help regading possible caregiver services and home health in the community.     SW was assigned to CCM referral. However, after SW completed chart review SW was able to identify that a HH referral was placed and [facility]  has accept Bernie for services. The HH order includes a request for a SW consult.     Due to [facility]  accepting Bernie for services, [facility] SW will not follow at this time.      Plan: [facility] SW to close CCM referral. [facility] HH to work closely with Bernie and family.     Next Follow-Up: Referral closed.

## 2024-03-11 DIAGNOSIS — F33.41 RECURRENT MAJOR DEPRESSIVE DISORDER, IN PARTIAL REMISSION (HCC): ICD-10-CM

## 2024-03-11 RX ORDER — SERTRALINE HYDROCHLORIDE 100 MG/1
200 TABLET, FILM COATED ORAL DAILY
Qty: 200 TABLET | Refills: 2 | Status: SHIPPED | OUTPATIENT
Start: 2024-03-11 | End: 2024-03-27 | Stop reason: SDUPTHER

## 2024-03-12 ENCOUNTER — HOME CARE VISIT (OUTPATIENT)
Dept: HOME HEALTH SERVICES | Facility: HOME HEALTHCARE | Age: 84
End: 2024-03-12
Payer: MEDICARE

## 2024-03-12 ENCOUNTER — DOCUMENTATION (OUTPATIENT)
Dept: CARDIOLOGY | Facility: MEDICAL CENTER | Age: 84
End: 2024-03-12
Payer: MEDICARE

## 2024-03-12 VITALS
HEART RATE: 56 BPM | OXYGEN SATURATION: 95 % | WEIGHT: 142 LBS | TEMPERATURE: 97.9 F | BODY MASS INDEX: 20.97 KG/M2 | RESPIRATION RATE: 16 BRPM | DIASTOLIC BLOOD PRESSURE: 62 MMHG | SYSTOLIC BLOOD PRESSURE: 140 MMHG

## 2024-03-12 PROCEDURE — 665999 HH PPS REVENUE DEBIT

## 2024-03-12 PROCEDURE — 665001 SOC-HOME HEALTH

## 2024-03-12 PROCEDURE — 665005 NO-PAY RAP - HOME HEALTH

## 2024-03-12 PROCEDURE — 665998 HH PPS REVENUE CREDIT

## 2024-03-12 PROCEDURE — G0495 RN CARE TRAIN/EDU IN HH: HCPCS

## 2024-03-12 ASSESSMENT — ACTIVITIES OF DAILY LIVING (ADL)
LAUNDRY ASSESSED: 1
ORAL_CARE_CURRENT_FUNCTION: INDEPENDENT
PREPARING MEALS: NEEDS ASSISTANCE
LAUNDRY: NEEDS ASSISTANCE
TOILETING: 1
HOUSEKEEPING ASSESSED: 1
TRANSPORTATION ASSESSED: 1
PHYSICAL TRANSFERS ASSESSED: 1
BATHING ASSESSED: 1
SHOPPING: DEPENDENT
AMBULATION ASSISTANCE: 1
AMBULATION ASSISTANCE: SUPERVISION
GROOMING ASSESSED: 1
ORAL_CARE_ASSESSED: 1
CURRENT_FUNCTION: SUPERVISION
TRANSPORTATION: DEPENDENT
TOILETING: SUPERVISION
LIGHT HOUSEKEEPING: NEEDS ASSISTANCE
GROOMING_CURRENT_FUNCTION: INDEPENDENT
FEEDING: INDEPENDENT
DRESSING_UB_CURRENT_FUNCTION: SUPERVISION
TELEPHONE USE ASSESSED: 1
USING THE TELPHONE: INDEPENDENT
BATHING_CURRENT_FUNCTION: SUPERVISION
SHOPPING ASSESSED: 1
FEEDING ASSESSED: 1
DRESSING_LB_CURRENT_FUNCTION: SUPERVISION

## 2024-03-12 ASSESSMENT — ENCOUNTER SYMPTOMS
LAST BOWEL MOVEMENT: 66910
DESCRIPTION OF MEMORY LOSS: SHORT TERM
DIFFICULTY THINKING: 1
STOOL FREQUENCY: LESS THAN DAILY
NAUSEA: DENIES
HIGHEST PAIN SEVERITY IN PAST 24 HOURS: 0/10
POOR JUDGMENT: 1
HYPERTENSION: 1
PAIN SEVERITY GOAL: 0/10
LOWEST PAIN SEVERITY IN PAST 24 HOURS: 0/10
DENIES PAIN: 1
PERSON REPORTING PAIN: PATIENT
VOMITING: DENIES

## 2024-03-12 ASSESSMENT — FIBROSIS 4 INDEX: FIB4 SCORE: 1.22

## 2024-03-12 NOTE — PROGRESS NOTES
Medication chart review for [facility] services    Received referral from [facility].   Medications reviewed  compared with discharge summary if available.  Discharge summary date, if applicable:   N/a    Current medication list per [facility]     Medication list one, patient is currently taking    Current Outpatient Medications:     CALCIUM & MAGNESIUM CARBONATES PO, 1 Tablet, Oral, DAILY    Prevagen, 1 Capsule, Oral, DAILY    Zinc Acetate-Sweetleaf (FP ZINC LOZENGES PO), 1 Lozenge, Oral, BID PRN    sertraline, 200 mg, Oral, DAILY    levothyroxine, Take 1 tablet by mouth once daily    Multiple Vitamin (MULTI VITAMIN DAILY PO), 1 Tablet, Oral, DAILY    Omega-3 Fatty Acids (OMEGA 3 PO), 1 Capsule, Oral, DAILY    Glucosamine HCl (GLUCOSAMINE PO), 1 Tablet, Oral, DAILY      Medication list two, drugs that the patient has been prescribed or recommended to take by their healthcare provider on discharge summary  N/a    Allergies   Allergen Reactions    Naprosyn [Naproxen] Rash     Rash on body    Pcn [Penicillins]      Reaction when she was a child        Labs     Lab Results   Component Value Date/Time    SODIUM 141 06/26/2023 02:36 PM    POTASSIUM 4.9 06/26/2023 02:36 PM    CHLORIDE 104 06/26/2023 02:36 PM    CO2 24 06/26/2023 02:36 PM    GLUCOSE 62 (L) 06/26/2023 02:36 PM    BUN 28 (H) 06/26/2023 02:36 PM    CREATININE 1.20 06/26/2023 02:36 PM    CREATININE 1.2 02/26/2008 02:48 PM     Lab Results   Component Value Date/Time    ALKPHOSPHAT 67 06/26/2023 02:36 PM    ASTSGOT 15 06/26/2023 02:36 PM    ALTSGPT 16 06/26/2023 02:36 PM    TBILIRUBIN 0.3 06/26/2023 02:36 PM    INR 1.09 11/13/2017 11:48 AM    ALBUMIN 4.5 06/26/2023 02:36 PM        Assessment for clinically significant drug interactions, drug omissions/additions, duplicative therapies.            CC   Kendra Diaz M.D.  [address]  [place] [address]  Fax: [phone]    [facility]  Phone [phone] fax  [phone]    This note was created using voice recognition software (Dragon). The accuracy of the dictation is limited by the abilities of the software. I have reviewed the note prior to signing, however some errors in grammar and context are still possible. If you have any questions related to this note please do not hesitate to contact our office.

## 2024-03-12 NOTE — CASE COMMUNICATION
Primary dx/Skilled need: 84 yo female referred to  for mild late onset Alzheimer's dementia.  Pt states that she is taking meds but stepdaughter states pt is not taking meds routinely.  At the end of SNV outside of house, Stepdaughter/Caregiver, Yoselyn Escobar, requests HH to encourage patient to allow her to manage the medications due to patient's short term memory loss while patient was in the house.  Yoselyn states that the patient is  often angry, depressed and has outbursts (slamming doors).  PMH:  HTN, HLD, PVD, MDD, CKD stage 3, SAMY, and SAMY.  SN frequency: 2w4  Zip code: [address]  Disciplines ordered: SN, MSW  Insurance and authorization: [facility]  Certification period: 3/12/24 - 5/10/24  Special considerations: NO VISITS ON   Prescriptions bottles .  Pt states that she is taking meds but stepdaughter states pt is not taking meds routinely.

## 2024-03-12 NOTE — CASE COMMUNICATION
Details Significance Orders   Drug-Drug  <jscript:void(0)>  Drug-Drug: levothyroxine and sertraline   Pharmacological effects and therapeutic benefits of levothyroxine may be decreased by sertraline.   Details Moderate   levothyroxine (SYNTHROID) 100 MCG Tab     sertraline (ZOLOFT) 100 MG Tab   Drug-Drug  <jscript:void(0)>  Drug-Drug: levothyroxine and CALCIUM & MAGNESIUM CARBONATES PO   Calcium salts may decrease pharmacologic effects  of levothyroxine.   Details Moderate   levothyroxine (SYNTHROID) 100 MCG Tab     CALCIUM & MAGNESIUM CARBONATES PO   Drug-Food  <jscript:void(0)>  Drug-Food: sertraline   Plasma concentrations of sertraline may be increased by grapefruit juice.   Details Minor   sertraline (ZOLOFT) 100 MG Tab

## 2024-03-13 PROCEDURE — 665998 HH PPS REVENUE CREDIT

## 2024-03-13 PROCEDURE — 665999 HH PPS REVENUE DEBIT

## 2024-03-13 PROCEDURE — G0180 MD CERTIFICATION HHA PATIENT: HCPCS | Performed by: PSYCHIATRY & NEUROLOGY

## 2024-03-13 NOTE — CASE COMMUNICATION
Vitals      /62          Resp 16        Weight 64.411 kg (142 lb)        SpO2 95% on room air       Pulse 56 (LOW)        Temp 36.6 C ( 97.9 F)

## 2024-03-14 PROCEDURE — 665999 HH PPS REVENUE DEBIT

## 2024-03-14 PROCEDURE — 665998 HH PPS REVENUE CREDIT

## 2024-03-15 ENCOUNTER — OFFICE VISIT (OUTPATIENT)
Dept: NEUROLOGY | Facility: MEDICAL CENTER | Age: 84
End: 2024-03-15
Attending: PSYCHIATRY & NEUROLOGY
Payer: MEDICARE

## 2024-03-15 ENCOUNTER — HOME CARE VISIT (OUTPATIENT)
Dept: HOME HEALTH SERVICES | Facility: HOME HEALTHCARE | Age: 84
End: 2024-03-15
Payer: MEDICARE

## 2024-03-15 ENCOUNTER — HOSPITAL ENCOUNTER (OUTPATIENT)
Dept: LAB | Facility: MEDICAL CENTER | Age: 84
End: 2024-03-15
Attending: PSYCHIATRY & NEUROLOGY
Payer: MEDICARE

## 2024-03-15 VITALS
SYSTOLIC BLOOD PRESSURE: 135 MMHG | RESPIRATION RATE: 16 BRPM | TEMPERATURE: 98.5 F | HEART RATE: 66 BPM | DIASTOLIC BLOOD PRESSURE: 62 MMHG

## 2024-03-15 VITALS
WEIGHT: 150.35 LBS | HEART RATE: 60 BPM | OXYGEN SATURATION: 97 % | SYSTOLIC BLOOD PRESSURE: 122 MMHG | BODY MASS INDEX: 22.27 KG/M2 | DIASTOLIC BLOOD PRESSURE: 60 MMHG | TEMPERATURE: 98 F | HEIGHT: 69 IN

## 2024-03-15 DIAGNOSIS — G31.84 AMNESTIC MCI (MILD COGNITIVE IMPAIRMENT WITH MEMORY LOSS): ICD-10-CM

## 2024-03-15 DIAGNOSIS — G30.1 MILD LATE ONSET ALZHEIMER'S DEMENTIA WITH AGITATION (HCC): Primary | ICD-10-CM

## 2024-03-15 DIAGNOSIS — F02.A11 MILD LATE ONSET ALZHEIMER'S DEMENTIA WITH AGITATION (HCC): Primary | ICD-10-CM

## 2024-03-15 LAB
25(OH)D3 SERPL-MCNC: 32 NG/ML (ref 30–100)
ANION GAP SERPL CALC-SCNC: 12 MMOL/L (ref 7–16)
BUN SERPL-MCNC: 28 MG/DL (ref 8–22)
CALCIUM SERPL-MCNC: 9.4 MG/DL (ref 8.5–10.5)
CHLORIDE SERPL-SCNC: 105 MMOL/L (ref 96–112)
CO2 SERPL-SCNC: 24 MMOL/L (ref 20–33)
CREAT SERPL-MCNC: 1.3 MG/DL (ref 0.5–1.4)
FOLATE SERPL-MCNC: 7.2 NG/ML
GFR SERPLBLD CREATININE-BSD FMLA CKD-EPI: 40 ML/MIN/1.73 M 2
GLUCOSE SERPL-MCNC: 91 MG/DL (ref 65–99)
POTASSIUM SERPL-SCNC: 5.2 MMOL/L (ref 3.6–5.5)
SODIUM SERPL-SCNC: 141 MMOL/L (ref 135–145)
TSH SERPL DL<=0.005 MIU/L-ACNC: 5.57 UIU/ML (ref 0.38–5.33)
VIT B12 SERPL-MCNC: 366 PG/ML (ref 211–911)

## 2024-03-15 PROCEDURE — 82607 VITAMIN B-12: CPT

## 2024-03-15 PROCEDURE — 36415 COLL VENOUS BLD VENIPUNCTURE: CPT

## 2024-03-15 PROCEDURE — 99212 OFFICE O/P EST SF 10 MIN: CPT | Performed by: PSYCHIATRY & NEUROLOGY

## 2024-03-15 PROCEDURE — 80048 BASIC METABOLIC PNL TOTAL CA: CPT

## 2024-03-15 PROCEDURE — G0299 HHS/HOSPICE OF RN EA 15 MIN: HCPCS

## 2024-03-15 PROCEDURE — 84443 ASSAY THYROID STIM HORMONE: CPT

## 2024-03-15 PROCEDURE — 3074F SYST BP LT 130 MM HG: CPT | Performed by: PSYCHIATRY & NEUROLOGY

## 2024-03-15 PROCEDURE — 665998 HH PPS REVENUE CREDIT

## 2024-03-15 PROCEDURE — 665999 HH PPS REVENUE DEBIT

## 2024-03-15 PROCEDURE — 84425 ASSAY OF VITAMIN B-1: CPT

## 2024-03-15 PROCEDURE — 82306 VITAMIN D 25 HYDROXY: CPT

## 2024-03-15 PROCEDURE — 3078F DIAST BP <80 MM HG: CPT | Performed by: PSYCHIATRY & NEUROLOGY

## 2024-03-15 PROCEDURE — 82746 ASSAY OF FOLIC ACID SERUM: CPT

## 2024-03-15 PROCEDURE — 99215 OFFICE O/P EST HI 40 MIN: CPT | Performed by: PSYCHIATRY & NEUROLOGY

## 2024-03-15 ASSESSMENT — PATIENT HEALTH QUESTIONNAIRE - PHQ9: CLINICAL INTERPRETATION OF PHQ2 SCORE: 0

## 2024-03-15 ASSESSMENT — ENCOUNTER SYMPTOMS
NAUSEA: PATIENT DENIES RECENT OR CURRENT NAUSEA
STOOL FREQUENCY: DAILY
FORGETFULNESS: 1
DESCRIPTION OF MEMORY LOSS: SHORT TERM
DIFFICULTY THINKING: 1
VOMITING: PATIENT DENIES RECENT EPISODES OF EMESIS
BOWEL PATTERN NORMAL: 1
FATIGUES EASILY: 1
FATIGUE: 1
LOSS OF VISUAL FIELD: 1
MUSCLE WEAKNESS: 1
PERSON REPORTING PAIN: PATIENT
DENIES PAIN: 1

## 2024-03-15 ASSESSMENT — FIBROSIS 4 INDEX: FIB4 SCORE: 1.22
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
## 2024-03-16 PROCEDURE — 665999 HH PPS REVENUE DEBIT

## 2024-03-16 PROCEDURE — 665998 HH PPS REVENUE CREDIT

## 2024-03-17 PROCEDURE — 665998 HH PPS REVENUE CREDIT

## 2024-03-17 PROCEDURE — 665999 HH PPS REVENUE DEBIT

## 2024-03-18 ENCOUNTER — HOME CARE VISIT (OUTPATIENT)
Dept: HOME HEALTH SERVICES | Facility: HOME HEALTHCARE | Age: 84
End: 2024-03-18
Payer: MEDICARE

## 2024-03-18 PROCEDURE — 665998 HH PPS REVENUE CREDIT

## 2024-03-18 PROCEDURE — 665999 HH PPS REVENUE DEBIT

## 2024-03-19 ENCOUNTER — HOME CARE VISIT (OUTPATIENT)
Dept: HOME HEALTH SERVICES | Facility: HOME HEALTHCARE | Age: 84
End: 2024-03-19
Payer: MEDICARE

## 2024-03-19 VITALS
DIASTOLIC BLOOD PRESSURE: 70 MMHG | OXYGEN SATURATION: 94 % | HEART RATE: 58 BPM | RESPIRATION RATE: 16 BRPM | TEMPERATURE: 99 F | SYSTOLIC BLOOD PRESSURE: 132 MMHG

## 2024-03-19 PROCEDURE — G0155 HHCP-SVS OF CSW,EA 15 MIN: HCPCS

## 2024-03-19 PROCEDURE — G0299 HHS/HOSPICE OF RN EA 15 MIN: HCPCS

## 2024-03-19 PROCEDURE — 665999 HH PPS REVENUE DEBIT

## 2024-03-19 PROCEDURE — 665998 HH PPS REVENUE CREDIT

## 2024-03-19 ASSESSMENT — ENCOUNTER SYMPTOMS
BOWEL PATTERN NORMAL: 1
VOMITING: PATIENT DENIES RECENT EPISODES OF EMESIS
DESCRIPTION OF MEMORY LOSS: SHORT TERM
STOOL FREQUENCY: DAILY
DIFFICULTY THINKING: 1
LOSS OF VISUAL FIELD: 1
POOR JUDGMENT: 1
DRY SKIN: 1
NAUSEA: PATIENT DENIES RECENT OR CURRENT NAUSEA
DENIES PAIN: 1
DIFFICULTY THINKING: 1
FORGETFULNESS: 1
FATIGUE: 1
LAST BOWEL MOVEMENT: 66918
PERSON REPORTING PAIN: PATIENT

## 2024-03-19 ASSESSMENT — ACTIVITIES OF DAILY LIVING (ADL): OASIS_M1830: 03

## 2024-03-19 NOTE — CASE COMMUNICATION
Reviewed and approved of edits.  ----- Message -----  From: Nicolasa Pat R.N.  Sent: 3/19/2024   9:13 AM PDT  To: Celina Ng R.N.      Quality Review for SOC OASIS by LAURA Pat, FUNMILAYO on  March 19, 2024     Edits completed by LAURA Pat RN:  1.  and  diagnosis coding updated per chart review.  2. Changed  to 3/12/24 per the LSOC order  3. Checked # 5 and 6 in  per chart review  4. Changed   to several times per week per chart review  5. Per narrative that patient needs supervision for safety, the following changes were made: , ,  changed to 2;  and  changed to 3  6. Per narrative stating patient needs moderate assistance with transferring, changed  to 2 and [id number] E to 3  7.  is no, there is no documentation of a clinically significant medication issue identified  8.  is 3 per ambula tion and mental status

## 2024-03-19 NOTE — PROGRESS NOTES
[facility] to residence. Permitted entry by patient who was unaware of visit. Patient denied falls, changes in medication or pain. MSW reviewed reason for visit, memory related resources. Patient can state she has memory problems. Per step daughter and RN she has limited recall ability. At the time of the visit patient could not recall how long she has been on her antidepressants or how long she has been on the current strength. She was oriented x2 (person, place). MSW provided caregiving resources and facility list for long term care planning. Patient is able to complete ADLs but struggles with medication. Per step daughter she also struggles with cooking (recently unable to use the ZAP[employer] oven). MSW did bring up POLST document as an option to direct care and encouraged patient to discuss with family and/or PCP. Patient denied having social needs and was agreeable to MSW eval only.     Tentative phone call with step daughter scheduled for Friday 3/22/24 to review resources provided.    Falls: 0    DME: toilet safety frame, hand shower    Community resources: n/a    Advance Directive: on file and reviewed    Oxygen: n/a      Liters:      Storage: Tanks/concentrator stored correctly      Medication List: Oxygen on medication list    Discipline Frequency:1 visit, patient agreeable    Follow Up: Tentative phone call with step daughter scheduled for Friday 3/22/24

## 2024-03-19 NOTE — Clinical Note
Quality Review for SOC OASIS by LAURA Pat RN on  March 19, 2024     Edits completed by LAURA Pat RN:  1.  and  diagnosis coding updated per chart review.  2. Changed  to 3/12/24 per the LSOC order  3. Checked # 5 and 6 in  per chart review  4. Changed  to several times per week per chart review  5. Per narrative that patient needs supervision for safety, the following changes were made: , ,  changed to 2;  and  changed to 3  6. Per narrative stating patient needs moderate assistance with transferring, changed  to 2 and [id number] E to 3  7.  is no, there is no documentation of a clinically significant medication issue identified  8.  is 3 per ambulation and mental status

## 2024-03-20 PROCEDURE — 665998 HH PPS REVENUE CREDIT

## 2024-03-20 PROCEDURE — 665999 HH PPS REVENUE DEBIT

## 2024-03-21 PROCEDURE — 665998 HH PPS REVENUE CREDIT

## 2024-03-21 PROCEDURE — 665999 HH PPS REVENUE DEBIT

## 2024-03-22 ENCOUNTER — HOME CARE VISIT (OUTPATIENT)
Dept: HOME HEALTH SERVICES | Facility: HOME HEALTHCARE | Age: 84
End: 2024-03-22
Payer: MEDICARE

## 2024-03-22 LAB — VIT B1 BLD-MCNC: 71 NMOL/L (ref 70–180)

## 2024-03-22 PROCEDURE — G0299 HHS/HOSPICE OF RN EA 15 MIN: HCPCS

## 2024-03-22 PROCEDURE — 665999 HH PPS REVENUE DEBIT

## 2024-03-22 PROCEDURE — 665998 HH PPS REVENUE CREDIT

## 2024-03-23 VITALS
RESPIRATION RATE: 18 BRPM | DIASTOLIC BLOOD PRESSURE: 68 MMHG | SYSTOLIC BLOOD PRESSURE: 150 MMHG | HEART RATE: 60 BPM | OXYGEN SATURATION: 93 % | TEMPERATURE: 97.9 F

## 2024-03-23 PROCEDURE — 665998 HH PPS REVENUE CREDIT

## 2024-03-23 PROCEDURE — 665999 HH PPS REVENUE DEBIT

## 2024-03-23 ASSESSMENT — ENCOUNTER SYMPTOMS
STOOL FREQUENCY: DAILY
DENIES PAIN: 1
POOR JUDGMENT: 1
BOWEL PATTERN NORMAL: 1
LAST BOWEL MOVEMENT: 66921
PERSON REPORTING PAIN: PATIENT

## 2024-03-24 PROCEDURE — 665998 HH PPS REVENUE CREDIT

## 2024-03-24 PROCEDURE — 665999 HH PPS REVENUE DEBIT

## 2024-03-25 ENCOUNTER — OFFICE VISIT (OUTPATIENT)
Dept: BEHAVIORAL HEALTH | Facility: CLINIC | Age: 84
End: 2024-03-25
Payer: MEDICARE

## 2024-03-25 DIAGNOSIS — G30.1 MILD LATE ONSET ALZHEIMER'S DEMENTIA WITH AGITATION (HCC): ICD-10-CM

## 2024-03-25 DIAGNOSIS — F33.41 RECURRENT MAJOR DEPRESSIVE DISORDER, IN PARTIAL REMISSION (HCC): ICD-10-CM

## 2024-03-25 DIAGNOSIS — F02.A11 MILD LATE ONSET ALZHEIMER'S DEMENTIA WITH AGITATION (HCC): ICD-10-CM

## 2024-03-25 PROCEDURE — 665999 HH PPS REVENUE DEBIT

## 2024-03-25 PROCEDURE — 665998 HH PPS REVENUE CREDIT

## 2024-03-26 ENCOUNTER — TELEPHONE (OUTPATIENT)
Dept: NEUROLOGY | Facility: MEDICAL CENTER | Age: 84
End: 2024-03-26
Payer: MEDICARE

## 2024-03-26 ENCOUNTER — HOME CARE VISIT (OUTPATIENT)
Dept: HOME HEALTH SERVICES | Facility: HOME HEALTHCARE | Age: 84
End: 2024-03-26
Payer: MEDICARE

## 2024-03-26 PROCEDURE — G0299 HHS/HOSPICE OF RN EA 15 MIN: HCPCS

## 2024-03-26 PROCEDURE — 665999 HH PPS REVENUE DEBIT

## 2024-03-26 PROCEDURE — 665998 HH PPS REVENUE CREDIT

## 2024-03-26 NOTE — TELEPHONE ENCOUNTER
Patients relative called and is in need of a letter of diagnosis to be able to give to the bank. Please advise if there is anything you need from me     Thank you   Aura

## 2024-03-27 ENCOUNTER — HOME CARE VISIT (OUTPATIENT)
Dept: HOME HEALTH SERVICES | Facility: HOME HEALTHCARE | Age: 84
End: 2024-03-27
Payer: MEDICARE

## 2024-03-27 VITALS — RESPIRATION RATE: 18 BRPM

## 2024-03-27 PROCEDURE — 665998 HH PPS REVENUE CREDIT

## 2024-03-27 PROCEDURE — 665999 HH PPS REVENUE DEBIT

## 2024-03-27 RX ORDER — SERTRALINE HYDROCHLORIDE 100 MG/1
200 TABLET, FILM COATED ORAL DAILY
Qty: 180 TABLET | Refills: 0 | Status: SHIPPED | OUTPATIENT
Start: 2024-03-27 | End: 2024-06-25

## 2024-03-27 ASSESSMENT — ENCOUNTER SYMPTOMS
FORGETFULNESS: 1
DENIES PAIN: 1
PERSON REPORTING PAIN: PATIENT
DIFFICULTY THINKING: 1
DESCRIPTION OF MEMORY LOSS: SHORT TERM
BOWEL PATTERN NORMAL: 1
POOR JUDGMENT: 1
LAST BOWEL MOVEMENT: 66924

## 2024-03-27 NOTE — PROGRESS NOTES
Evaluation completed by: Stephen Trivedi M.D.   Date of Service: 2024  Appointment type: in-office appointment.    Information below was collected from: patient, patient's stepdaughter, Yoselyn, who is also guardian with power of .      CHIEF COMPLIANT:  Initial  Evaluation and Establish Care      HPI:   Bernie Barrientos is a 83 y.o. old female who presents today for initial psychiatric evaluation to address Initial  Evaluation and Establish Care    Patient is seen for initial evaluation after being referred to behavioral health to discuss agitation.  Stepdaughter (Yoselyn) was present for appointment and provided information.  Bernie and Yoselyn live together.    The patient was recently diagnosed with Alzheimer disease through neuropsych testing.  She had an appointment with her neurologist to discuss diagnosis and treatment options.  They decided not to start amyloid medications due to limited time span of improvement and risks/adverse effects.    The patient has a history of major depression that was previously managed with sertraline 200 mg po daily for many years following the death of Bernie's  (Yoselyn's father) 8 years ago.  Home health care has recently been visiting regularly (last visit coming up).  They discovered that she had medication bottles that had  in  and realized that the patient had not been taking these medications for the past two years.  These were sertraline and also Synthroid.  Around that time, Yoselyn had noticed that her personality had changed.  Patient was more irritable and would become frustrated with Yoselyn and was having outbursts (swiped a puzzle off of the table).  She was also more tired and would sleep more during the day, and she reported feeling cold.  Her TSH was found to be elevated.  Her previous medications were restarted around one week ago and Yoselyn says that her personality has already started seeming like she is back to her normal self, with a  generally relaxed attitude.      The patient denies any concerns and when discussing outbursts, she joked that she has always been a little kramer.  She says that her mood today is good.  She spends a lot of time watching TV but Yoselyn says she does not know what she was watching if asked.  She seems content and does not have problems with wandering or other risks.  She does still drive to the grocery store, and neurology has placed a referral with the DMV for a driving test.  She no longer uses the the expressway.    CURRENT MEDICATIONS    Current Outpatient Medications:     sertraline (ZOLOFT) 100 MG Tab, Take 2 tablets by mouth once daily, Disp: 200 Tablet, Rfl: 2    CALCIUM & MAGNESIUM CARBONATES PO, Take 1 Tablet by mouth every day. Calcium 1147 mg/ Magnesium 805 mg  Indications: Supplement, Disp: , Rfl:     Apoaequorin (PREVAGEN) 10 MG Cap, Take 1 Capsule by mouth every day. Indications: Supplement, Disp: , Rfl:     Zinc Acetate-Sweetleaf (FP ZINC LOZENGES PO), Take 1 Lozenge by mouth 2 times a day as needed (cold or flu symptoms). Indications: Supplement, Disp: , Rfl:     levothyroxine (SYNTHROID) 100 MCG Tab, Take 1 tablet by mouth once daily, Disp: 90 Tablet, Rfl: 0    Multiple Vitamin (MULTI VITAMIN DAILY PO), Take 1 Tablet by mouth every day. Indications: Supplement, Disp: , Rfl:     Omega-3 Fatty Acids (OMEGA 3 PO), Take 1 Capsule by mouth every day. Indications: Supplement, Disp: , Rfl:     Glucosamine HCl (GLUCOSAMINE PO), Take 1 Tablet by mouth every day. Indications: Supplement, Disp: , Rfl:     Psychiatric Review of Systems:current symptoms as reported by pt.  Depression: denies sadness or anhedonia  Ashli: denies episodic mood elevation  Anxiety/Panic Attacks: denies   PTSD symptom: deneis  Psychosis: Patient reports no signs or symptoms indicative of psychosis  Tics/Abnormal movements: denies  Eating Disorders: denies concerns  Sleep: more tired but recently restarted Synthroid and  improved  Behavioral/Aggression: Agitated and improving since restarting sertraline last week    MEDICAL REVIEW OF SYSTEMS   Constitutional: had been feeling cold, tired/sleeping more than usual but these have improved since restarting Synthroid  Neurological: memory loss  Denies other medical concerns    PAST PSYCHIATRIC HISTORY  -Previous Psychiatric Diagnosis: Major Depressive Disorder  -Inpatient Psychiatric Hospitalizations: denies  -Outpatient Psychiatric Care:  sertraline through PCP  -Self Harm: denies  -Suicide Attempts: denies  -Access to Firearms: denies  -Psychiatric Medications: sertraline    MEDICAL HISTORY  Other Medical History:   Past Medical History:   Diagnosis Date    Anesthesia     PONV    Arthritis 2003    bilat hip and neck surg related to arthritis, bilateral thumbs surgery do to arthritis    Colon polyp     Colon polyps     Dental disorder     upper partial dentures    DJD (degenerative joint disease)     Heart valve disease 2005    mitral valve prolapse    Hypothyroidism     Migraine     Postmenopausal hormone replacement therapy      Allergies:   Allergies   Allergen Reactions    Naprosyn [Naproxen] Rash     Rash on body    Pcn [Penicillins]      Reaction when she was a child        SURGICAL HISTORY  Past Surgical History:   Procedure Laterality Date    COLONOSCOPY WITH POLYP  8/15/2013    Performed by Javier Gould M.D. at [facility]    HAND SURGERY  2009    right index finger joint reconstruction    HAND SURGERY  2004    bilateral thumb reconstruction    ABDOMINAL HYSTERECTOMY TOTAL  1984    benign    APPENDECTOMY  1957    CERVICAL FUSION POSTERIOR      C4,5,6,7 neck fusion    HIP ARTHROPLASTY TOTAL Bilateral     TONSILLECTOMY  as child        FAMILY PSYCHIATRIC HISTORY  Family History   Problem Relation Age of Onset    Colorectal Cancer Mother     Diabetes Mother     No Known Problems Father         Passed away before the pt was born     Thyroid Sister     Diabetes Other  "    Heart Disease Neg Hx     Hypertension Neg Hx     Hyperlipidemia Neg Hx     Breast Cancer Neg Hx     Peritoneal Cancer Neg Hx     Ovarian Cancer Neg Hx     Tubal Cancer Neg Hx        SOCIAL HISTORY  Relationships/Family:  8 years ago, had helped care for  when he was being treated for cancer, now lives with his daughter/her stepdaughterYoselyn  Current living situation:  lives with his daughter/her stepdaughterYoselyn      PSYCHIATRIC EXAMINATION   Mental Status Exam    Appearance: Appropriate dress and grooming  Sensorium: Disoriented (Comment: Oriented to person only but able to understand nature of the appointment)  Behavior: Passive (Comment: Pleasant)  Motor Activity: Unremarkable  Eye Contact: Adequate  Speech: Normal  Mood: Euthymic  Affect: Congruent with normal range  Thought Flow: Poverty of thought  Thought Content: Unremarkable  Suicidality: Denies  Hallucinations: Denies  Cognition: Impaired memory  Insight: Impaired  Reliability: Poor (Comment: Able to describe daily routine/activities but unable to recall specific events)  Judgement: Impaired (Comment: No overtly risky behaviors but had stopped taking medications because lacking awareness of treatment plan or diagnoses)  Other Observations: Able to converse and follow conversation but unable to provide information.  Able to provide logical explanations for deficits (\"I've never been good with recalling timelines\")         LABS:  Hospital Outpatient Visit on 03/15/2024   Component Date Value    Vitamin B1 03/15/2024 71     Sodium 03/15/2024 141     Potassium 03/15/2024 5.2     Chloride 03/15/2024 105     Co2 03/15/2024 24     Glucose 03/15/2024 91     Bun 03/15/2024 28 (H)     Creatinine 03/15/2024 1.30     Calcium 03/15/2024 9.4     Anion Gap 03/15/2024 12.0     25-Hydroxy   Vitamin D 25 03/15/2024 32     TSH 03/15/2024 5.570 (H)     Vitamin B12 -True Cobala* 03/15/2024 366     Folate -Folic Acid 03/15/2024 7.2     GFR (CKD-EPI) " 03/15/2024 40 (A)    Hospital Outpatient Visit on 06/26/2023   Component Date Value    Creatinine, Urine 06/26/2023 176.49     Microalbumin, Urine Rand* 06/26/2023 5.1     Micro Alb Creat Ratio 06/26/2023 29     Sodium 06/26/2023 141     Potassium 06/26/2023 4.9     Chloride 06/26/2023 104     Co2 06/26/2023 24     Anion Gap 06/26/2023 13.0     Glucose 06/26/2023 62 (L)     Bun 06/26/2023 28 (H)     Creatinine 06/26/2023 1.20     Calcium 06/26/2023 9.5     AST(SGOT) 06/26/2023 15     ALT(SGPT) 06/26/2023 16     Alkaline Phosphatase 06/26/2023 67     Total Bilirubin 06/26/2023 0.3     Albumin 06/26/2023 4.5     Total Protein 06/26/2023 6.9     Globulin 06/26/2023 2.4     A-G Ratio 06/26/2023 1.9     WBC 06/26/2023 5.7     RBC 06/26/2023 5.03     Hemoglobin 06/26/2023 14.8     Hematocrit 06/26/2023 46.9     MCV 06/26/2023 93.2     MCH 06/26/2023 29.4     MCHC 06/26/2023 31.6 (L)     RDW 06/26/2023 51.9 (H)     Platelet Count 06/26/2023 258     MPV 06/26/2023 10.5     Correct Calcium 06/26/2023 9.1     GFR (CKD-EPI) 06/26/2023 45 (A)        CURRENT RISK ASSESSMENT       Suicide: Low       Homicide: Low       Self-Harm: Low       Relapse: Not applicable       Crisis Safety Plan Reviewed Not Indicated    [place]  records   reviewed.  No concerns about misuse of controlled substance.    ASSESSMENT  Bernie Barrientos is a 83 y.o. old female who presents today for initial psychiatric evaluation to address Initial  Evaluation and Establish Care  Patient had been having outbursts that began two years ago and were a change from her normal personality.  She was irritable and reactive to frustration.  She was recently diagnosed with Alzheimer disease.  She is living with her stepdaughter, who was at the appointment today and has POA.  They recently had home health care visits, and it was discovered that the patient had not been taking sertraline or levothyroxine for the past two years.  These were restarted last week and the  patient has returned to her normal, easygoing self.  She denies concerns.  We discussed having Yoselyn check that she has taken her medications every day, to leave these near the  (patient makes coffee every morning), and possibly purchase a pill organizer with an alarm to remind the patient to take these as well.  Provided a simple, straightforward letter with my instructions to take both of her medications every day in order to avoid potential pushback toward Yoselyn.  Will also order CMP to assess liver functioning.  We will follow up in six weeks to check in or sooner if needed.    DIAGNOSES/PLAN  Encounter Diagnoses   Name Primary?    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  Follow up in six weeks.  Ordered CMP to assess liver functioning.      Medication options, alternatives (including no medications) and medication risks/benefits/side effects were discussed in detail.  The patient was advised to call, message clinician on [employer], or come in to the clinic if symptoms worsen or if questions/issues regarding their medications arise.  The patient verbalized understanding and agreement.    The patient was educated to call 911, call the suicide hotline, or go to the local ER if having thoughts of suicide or homicide.  The patient verbalized understanding and agreement.   The proposed treatment plan was discussed with the patient who was provided the opportunity to ask questions and make suggestions regarding alternative treatment. Patient verbalized understanding and expressed agreement with the plan.      Return to clinic in 6 weeks or sooner if symptoms worsen - stepdaughter will call to schedule (did not have calendar with her today).    This appointment was supervised by attending psychiatrist, who agrees with assessment and treatment plan.  See attending attestation for  more details.     Stephen Trivedi M.D.

## 2024-03-27 NOTE — CASE COMMUNICATION
Reviewed and approved of edit.  ----- Message -----  From: Nicolasa Pat R.N.  Sent: 3/27/2024   5:45 AM PDT  To: Celina Ng R.N.      Additional revision on March 27, 2024, changed  to 1 as this patient needs supervision with ambulation.

## 2024-03-27 NOTE — CASE COMMUNICATION
Additional revision on March 27, 2024, changed  to 1 as this patient needs supervision with ambulation.

## 2024-03-28 ENCOUNTER — HOME CARE VISIT (OUTPATIENT)
Dept: HOME HEALTH SERVICES | Facility: HOME HEALTHCARE | Age: 84
End: 2024-03-28
Payer: MEDICARE

## 2024-03-28 VITALS
DIASTOLIC BLOOD PRESSURE: 68 MMHG | SYSTOLIC BLOOD PRESSURE: 128 MMHG | OXYGEN SATURATION: 98 % | HEART RATE: 63 BPM | RESPIRATION RATE: 16 BRPM | TEMPERATURE: 98.1 F

## 2024-03-28 PROCEDURE — 665999 HH PPS REVENUE DEBIT

## 2024-03-28 PROCEDURE — 665998 HH PPS REVENUE CREDIT

## 2024-03-28 PROCEDURE — G0495 RN CARE TRAIN/EDU IN HH: HCPCS

## 2024-03-28 ASSESSMENT — ENCOUNTER SYMPTOMS
BOWEL PATTERN NORMAL: 1
LAST BOWEL MOVEMENT: 66926
DENIES PAIN: 1
FORGETFULNESS: 1
DRY SKIN: 1
STOOL FREQUENCY: DAILY
DESCRIPTION OF MEMORY LOSS: SHORT TERM
POOR JUDGMENT: 1
PERSON REPORTING PAIN: PATIENT
DIFFICULTY THINKING: 1
SKIN LESIONS: 1
DESCRIPTION OF MEMORY LOSS: LONG TERM

## 2024-03-28 ASSESSMENT — PAIN SCALES - PAIN ASSESSMENT IN ADVANCED DEMENTIA (PAINAD)
BODYLANGUAGE: 0 - RELAXED.
CONSOLABILITY: 0 - NO NEED TO CONSOLE.
TOTALSCORE: 0
NEGVOCALIZATION: 0 - NONE.
FACIALEXPRESSION: 0 - SMILING OR INEXPRESSIVE.

## 2024-03-28 ASSESSMENT — PATIENT HEALTH QUESTIONNAIRE - PHQ9: CLINICAL INTERPRETATION OF PHQ2 SCORE: 0

## 2024-03-28 ASSESSMENT — ACTIVITIES OF DAILY LIVING (ADL)
AMBULATION ASSISTANCE: STAND BY ASSIST
CURRENT_FUNCTION: STAND BY ASSIST

## 2024-03-28 NOTE — HOME HEALTH
Joint visit made by Marion Serrato Apprentice Nurse and Trisha Hicks RN.  Documentation by MONROE Serrato and supervised by RADHA Hicks RN

## 2024-03-29 PROCEDURE — 665998 HH PPS REVENUE CREDIT

## 2024-03-29 PROCEDURE — 665999 HH PPS REVENUE DEBIT

## 2024-03-30 PROCEDURE — 665999 HH PPS REVENUE DEBIT

## 2024-03-30 PROCEDURE — 665998 HH PPS REVENUE CREDIT

## 2024-04-02 ENCOUNTER — HOME CARE VISIT (OUTPATIENT)
Dept: HOME HEALTH SERVICES | Facility: HOME HEALTHCARE | Age: 84
End: 2024-04-02
Payer: MEDICARE

## 2024-04-02 VITALS
TEMPERATURE: 98.7 F | RESPIRATION RATE: 16 BRPM | DIASTOLIC BLOOD PRESSURE: 70 MMHG | OXYGEN SATURATION: 96 % | SYSTOLIC BLOOD PRESSURE: 132 MMHG | HEART RATE: 61 BPM

## 2024-04-02 PROCEDURE — G0299 HHS/HOSPICE OF RN EA 15 MIN: HCPCS

## 2024-04-02 ASSESSMENT — ENCOUNTER SYMPTOMS
DENIES PAIN: 1
PERSON REPORTING PAIN: PATIENT
PAIN: PATIENT DENIES ANY CURRENT SYMPTOMS
LAST BOWEL MOVEMENT: 66932
STOOL FREQUENCY: DAILY
AGITATION: 1
VOMITING: PATIENT DENIES RECENT EPISODES OF EMESIS
NAUSEA: PATIENT DENIES RECENT OR CURRENT NAUSEA
FORGETFULNESS: 1
DESCRIPTION OF MEMORY LOSS: SHORT TERM
DIFFICULTY THINKING: 1
BOWEL PATTERN NORMAL: 1
POOR JUDGMENT: 1
CHANGE IN APPETITE: UNCHANGED
DESCRIPTION OF MEMORY LOSS: IMMEDIATE
APPETITE LEVEL: GOOD

## 2024-04-02 ASSESSMENT — PAIN SCALES - PAIN ASSESSMENT IN ADVANCED DEMENTIA (PAINAD)
TOTALSCORE: 0
FACIALEXPRESSION: 0 - SMILING OR INEXPRESSIVE.
BODYLANGUAGE: 0 - RELAXED.
NEGVOCALIZATION: 0 - NONE.
CONSOLABILITY: 0 - NO NEED TO CONSOLE.

## 2024-04-02 ASSESSMENT — ACTIVITIES OF DAILY LIVING (ADL)
HOME_HEALTH_OASIS: 02
OASIS_M1830: 02

## 2024-04-03 ENCOUNTER — HOME CARE VISIT (OUTPATIENT)
Dept: HOME HEALTH SERVICES | Facility: HOME HEALTHCARE | Age: 84
End: 2024-04-03
Payer: MEDICARE

## 2024-04-03 NOTE — CASE COMMUNICATION
Quality Review Completed for DC OASIS by IRON Mcarthur RN on 4/3/2024:     Edits completed by IRON Mcarthur RN:  1. DC reason changed to 'unable to attain goals'

## 2024-04-15 RX ORDER — LEVOTHYROXINE SODIUM 0.1 MG/1
TABLET ORAL
Qty: 90 TABLET | Refills: 0 | Status: SHIPPED | OUTPATIENT
Start: 2024-04-15

## 2024-04-15 NOTE — TELEPHONE ENCOUNTER
Received request via: Pharmacy    Was the patient seen in the last year in this department? Yes    Does the patient have an active prescription (recently filled or refills available) for medication(s) requested? No    Pharmacy Name: [facility]    Does the patient have [facility] and need 100 day supply (blood pressure, diabetes and cholesterol meds only)? Medication is not for cholesterol, blood pressure or diabetes

## 2024-04-25 DIAGNOSIS — I27.20 PULMONARY HYPERTENSION (HCC): ICD-10-CM

## 2024-04-25 DIAGNOSIS — R09.02 HYPOXIA: ICD-10-CM

## 2024-04-29 ENCOUNTER — TELEPHONE (OUTPATIENT)
Dept: NEUROLOGY | Facility: MEDICAL CENTER | Age: 84
End: 2024-04-29
Payer: MEDICARE

## 2024-04-29 NOTE — TELEPHONE ENCOUNTER
Pt's step-daughter left a voicemail stating she left a message for the MA on 4/17/24 and 4/22/24 and hasn't heard back She is following up and would like a call back asap please.     4/29/24 11:08 am

## 2024-04-30 ENCOUNTER — HOSPITAL ENCOUNTER (OUTPATIENT)
Dept: LAB | Facility: MEDICAL CENTER | Age: 84
End: 2024-04-30
Attending: PSYCHIATRY & NEUROLOGY
Payer: MEDICARE

## 2024-04-30 DIAGNOSIS — G31.84 AMNESTIC MCI (MILD COGNITIVE IMPAIRMENT WITH MEMORY LOSS): ICD-10-CM

## 2024-04-30 LAB
ANION GAP SERPL CALC-SCNC: 11 MMOL/L (ref 7–16)
BUN SERPL-MCNC: 19 MG/DL (ref 8–22)
CALCIUM SERPL-MCNC: 9.2 MG/DL (ref 8.5–10.5)
CHLORIDE SERPL-SCNC: 105 MMOL/L (ref 96–112)
CO2 SERPL-SCNC: 24 MMOL/L (ref 20–33)
CREAT SERPL-MCNC: 1.1 MG/DL (ref 0.5–1.4)
FASTING STATUS PATIENT QL REPORTED: NORMAL
GFR SERPLBLD CREATININE-BSD FMLA CKD-EPI: 49 ML/MIN/1.73 M 2
GLUCOSE SERPL-MCNC: 101 MG/DL (ref 65–99)
POTASSIUM SERPL-SCNC: 4.7 MMOL/L (ref 3.6–5.5)
SODIUM SERPL-SCNC: 140 MMOL/L (ref 135–145)
VIT B12 SERPL-MCNC: 718 PG/ML (ref 211–911)

## 2024-05-01 LAB — FOLATE SERPL-MCNC: 22.7 NG/ML

## 2024-05-13 NOTE — PROGRESS NOTES
Pulmonary Clinic- Initial Consult    Date of Service: 5/16/24    Referring Physician: Kendra Diaz M.D.    Reason for Consult: Hypoxia and PH    Chief Complaint: No chief complaint on file.      HPI:   Bernie Barrientos is a 83 y.o. female who is followed by DR. Diaz and is referred to the pulmonary clinic for Hypoxia and PH.       Functional status:  MMRC Grade:   0: Breathless only during strenuous exercise  1: Short of breath when hurrying or going up a small hill  2: Walks slower than friends due to breathlessness, has to stop at own pace  3: Stops to catch breath on level ground after 100m  4: Breathless with ambulating around house or ADLs    NYHA Class:   I: no symptoms with activity. Normal  II. Mild symptoms with normal physical activity and comfortable at rest.  III: Moderate symptoms with less than normal physical activity. Only comfortable at rest  IV: Severe symptoms with symptoms of heart failure, even at rest.    Pulmonary Specific History:    Childhood illnesses/Pulmonary diseases: ***  Smoking history: ***  Prior PFT: ***  Hospitalizations: ***  Hx of GERD: ***  Cardiac history: ***  Pertinent Medical History: ***  Allergies: ***   Exposures/Employment history:***   Current pulmonary specific medications: ***  Vaccinations: ***        Past Medical History:   Diagnosis Date    Anesthesia     PONV    Arthritis 2003    bilat hip and neck surg related to arthritis, bilateral thumbs surgery do to arthritis    Colon polyp     Colon polyps     Dental disorder     upper partial dentures    DJD (degenerative joint disease)     Heart valve disease 2005    mitral valve prolapse    Hypothyroidism     Migraine     Postmenopausal hormone replacement therapy        Past Surgical History:   Procedure Laterality Date    COLONOSCOPY WITH POLYP  8/15/2013    Performed by J[facility] M[facility]D. at [facility]    HAND SURGERY  2009    right index finger joint reconstruction    HAND SURGERY  2004     bilateral thumb reconstruction    ABDOMINAL HYSTERECTOMY TOTAL      benign    APPENDECTOMY      CERVICAL FUSION POSTERIOR      C4,5,6,7 neck fusion    HIP ARTHROPLASTY TOTAL Bilateral     TONSILLECTOMY  as child       Social History     Socioeconomic History    Marital status:      Spouse name: Not on file    Number of children: Not on file    Years of education: Not on file    Highest education level: Not on file   Occupational History    Not on file   Tobacco Use    Smoking status: Former     Current packs/day: 0.00     Average packs/day: 1 pack/day for 8.0 years (8.0 ttl pk-yrs)     Types: Cigarettes     Start date: 1972     Quit date: 1980     Years since quittin.3    Smokeless tobacco: Never   Vaping Use    Vaping Use: Never used   Substance and Sexual Activity    Alcohol use: Yes     Alcohol/week: 1.2 oz     Types: 2 Glasses of wine per week     Comment: occ    Drug use: No    Sexual activity: Not Currently   Other Topics Concern    Not on file   Social History Narrative    Not on file     Social Determinants of Health     Financial Resource Strain: Not on file   Food Insecurity: Not on file   Transportation Needs: Not on file   Physical Activity: Not on file   Stress: Not on file   Social Connections: Feeling Socially Integrated (2024)    OASIS : Social Isolation     Frequency of experiencing loneliness or isolation: Never   Intimate Partner Violence: Not on file   Housing Stability: Not on file          Family History   Problem Relation Age of Onset    Colorectal Cancer Mother     Diabetes Mother     No Known Problems Father         Passed away before the pt was born     Thyroid Sister     Diabetes Other     Heart Disease Neg Hx     Hypertension Neg Hx     Hyperlipidemia Neg Hx     Breast Cancer Neg Hx     Peritoneal Cancer Neg Hx     Ovarian Cancer Neg Hx     Tubal Cancer Neg Hx        Current Outpatient Medications on File Prior to Visit   Medication Sig Dispense Refill     levothyroxine (SYNTHROID) 100 MCG Tab Take 1 tablet by mouth once daily 90 Tablet 0    sertraline (ZOLOFT) 100 MG Tab Take 2 Tablets by mouth every day for 90 days. Indications: Major Depressive Disorder 180 Tablet 0    CALCIUM & MAGNESIUM CARBONATES PO Take 1 Tablet by mouth every day. Calcium 1147 mg/ Magnesium 805 mg  Indications: Supplement      Apoaequorin (PREVAGEN) 10 MG Cap Take 1 Capsule by mouth every day. Indications: Supplement      Zinc Acetate-Sweetleaf (FP ZINC LOZENGES PO) Take 1 Lozenge by mouth 2 times a day as needed (cold or flu symptoms). Indications: Supplement      Multiple Vitamin (MULTI VITAMIN DAILY PO) Take 1 Tablet by mouth every day. Indications: Supplement      Omega-3 Fatty Acids (OMEGA 3 PO) Take 1 Capsule by mouth every day. Indications: Supplement      Glucosamine HCl (GLUCOSAMINE PO) Take 1 Tablet by mouth every day. Indications: Supplement       No current facility-administered medications on file prior to visit.       Allergies: Naprosyn [naproxen] and Pcn [penicillins]      ROS:   ROS    Vitals:  There were no vitals taken for this visit.    Physical Exam:  Physical Exam      Vaccinations:    Pneumovax: Complete  Covid: Vaccinated  Annual Flu: Due in fall    Pertinent Studies:  Laboratory Data:    6MWT:    PFTs as reviewed by me personally show:    Imaging as reviewed by me personally show:      Pertinent Cardiac Studies:  Echo:  8/16/21  CONCLUSIONS  Fair quality study.  Left ventricular ejection fraction is visually estimated to be 65%.  Aortic valve not well visualized, probably trileaflet, without   significant stenosis or regurgitation.  Mild tricuspid regurgitation.  Estimated right ventricular systolic pressure is 45 mmHg.    Assessment/Plan:    Problem List Items Addressed This Visit    None       No follow-ups on file.     This note was generated using voice recognition software which has a chance of producing errors of grammar and possibly content.  I have made  every reasonable attempt to find and correct any obvious errors, but it should be expected that some may not be found prior to finalization of this note.    Time spent in record review prior to patient arrival, reviewing results, and in face-to-face encounter totaled *** min, excluding any procedures if performed.      Enid Blakely MD RD  Pulmonary and Critical Care Medicine  [facility]

## 2024-05-16 ENCOUNTER — APPOINTMENT (OUTPATIENT)
Dept: SLEEP MEDICINE | Facility: MEDICAL CENTER | Age: 84
End: 2024-05-16
Attending: FAMILY MEDICINE
Payer: MEDICARE

## 2024-05-24 ENCOUNTER — TELEPHONE (OUTPATIENT)
Dept: HEALTH INFORMATION MANAGEMENT | Facility: OTHER | Age: 84
End: 2024-05-24
Payer: MEDICARE

## 2024-05-29 ENCOUNTER — OFFICE VISIT (OUTPATIENT)
Dept: MEDICAL GROUP | Facility: PHYSICIAN GROUP | Age: 84
End: 2024-05-29
Payer: MEDICARE

## 2024-05-29 ENCOUNTER — TELEPHONE (OUTPATIENT)
Dept: MEDICAL GROUP | Facility: PHYSICIAN GROUP | Age: 84
End: 2024-05-29

## 2024-05-29 VITALS
HEART RATE: 57 BPM | DIASTOLIC BLOOD PRESSURE: 70 MMHG | BODY MASS INDEX: 21.33 KG/M2 | OXYGEN SATURATION: 93 % | WEIGHT: 149 LBS | SYSTOLIC BLOOD PRESSURE: 147 MMHG | HEIGHT: 70 IN | TEMPERATURE: 98.7 F

## 2024-05-29 DIAGNOSIS — R03.0 ELEVATED BLOOD PRESSURE READING: ICD-10-CM

## 2024-05-29 DIAGNOSIS — E03.9 ACQUIRED HYPOTHYROIDISM: ICD-10-CM

## 2024-05-29 DIAGNOSIS — F02.A11 MILD LATE ONSET ALZHEIMER'S DEMENTIA WITH AGITATION (HCC): ICD-10-CM

## 2024-05-29 DIAGNOSIS — G30.1 MILD LATE ONSET ALZHEIMER'S DEMENTIA WITH AGITATION (HCC): ICD-10-CM

## 2024-05-29 DIAGNOSIS — G47.33 SLEEP APNEA, OBSTRUCTIVE: ICD-10-CM

## 2024-05-29 DIAGNOSIS — N18.31 STAGE 3A CHRONIC KIDNEY DISEASE: ICD-10-CM

## 2024-05-29 RX ORDER — CALCIUM CARBONATE/VITAMIN D3 600 MG-10
50 TABLET ORAL DAILY
COMMUNITY

## 2024-05-29 ASSESSMENT — FIBROSIS 4 INDEX: FIB4 SCORE: 1.22

## 2024-05-29 NOTE — TELEPHONE ENCOUNTER
With the short staffing, I'm not certain I will have the time to discuss concerns over the phone. Can the patient send a vLexhart message?

## 2024-05-29 NOTE — TELEPHONE ENCOUNTER
The patient emergency  Yoselyn called today and would like  to call her to discuss the patients appt before the patient comes in later today
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
## 2024-06-04 ENCOUNTER — PATIENT OUTREACH (OUTPATIENT)
Dept: HEALTH INFORMATION MANAGEMENT | Facility: OTHER | Age: 84
End: 2024-06-04
Payer: MEDICARE

## 2024-06-04 ENCOUNTER — PATIENT MESSAGE (OUTPATIENT)
Dept: HEALTH INFORMATION MANAGEMENT | Facility: OTHER | Age: 84
End: 2024-06-04

## 2024-06-04 DIAGNOSIS — R01.1 HEART MURMUR: ICD-10-CM

## 2024-06-04 DIAGNOSIS — E78.00 PURE HYPERCHOLESTEROLEMIA: ICD-10-CM

## 2024-06-04 DIAGNOSIS — N18.30 STAGE 3 CHRONIC KIDNEY DISEASE, UNSPECIFIED WHETHER STAGE 3A OR 3B CKD: ICD-10-CM

## 2024-06-04 NOTE — PROGRESS NOTES
I attempted to contact the patient by phone this afternoon in order to speak to her about personal care management services. No answer. Message left on indicated preferred number belonging to Yoselyn Escobar, who is an approved medical contact. Contact information provided. Message sent by [employer].

## 2024-06-06 ENCOUNTER — TELEPHONE (OUTPATIENT)
Dept: HEALTH INFORMATION MANAGEMENT | Facility: OTHER | Age: 84
End: 2024-06-06
Payer: MEDICARE

## 2024-06-06 NOTE — TELEPHONE ENCOUNTER
Attempted to return call in response to message received from PCM line this morning. No answer. Message left with contact information.

## 2024-06-07 ENCOUNTER — PATIENT MESSAGE (OUTPATIENT)
Dept: HEALTH INFORMATION MANAGEMENT | Facility: OTHER | Age: 84
End: 2024-06-07

## 2024-06-14 ENCOUNTER — APPOINTMENT (OUTPATIENT)
Dept: LAB | Facility: MEDICAL CENTER | Age: 84
End: 2024-06-14
Payer: MEDICARE

## 2024-06-25 ENCOUNTER — DOCUMENTATION (OUTPATIENT)
Dept: NEUROLOGY | Facility: MEDICAL CENTER | Age: 84
End: 2024-06-25
Payer: MEDICARE

## 2024-06-25 ENCOUNTER — TELEPHONE (OUTPATIENT)
Dept: NEUROLOGY | Facility: MEDICAL CENTER | Age: 84
End: 2024-06-25
Payer: MEDICARE

## 2024-06-25 NOTE — TELEPHONE ENCOUNTER
Patient daughter called and is in need of a new letter of capacity to state that she is unable to make financial decisions by herself.    Please let me know once this is done so we can send it to them     Thank you

## 2024-06-25 NOTE — TELEPHONE ENCOUNTER
They are also wanting to see if they can get another referral for behavioral health( they really enjoyed Dr Wisdom)

## 2024-07-03 ENCOUNTER — TELEPHONE (OUTPATIENT)
Dept: HEALTH INFORMATION MANAGEMENT | Facility: OTHER | Age: 84
End: 2024-07-03
Payer: MEDICARE

## 2024-07-12 RX ORDER — LEVOTHYROXINE SODIUM 0.1 MG/1
TABLET ORAL
Qty: 90 TABLET | Refills: 0 | Status: SHIPPED | OUTPATIENT
Start: 2024-07-12 | End: 2024-07-23 | Stop reason: SDUPTHER

## 2024-07-22 ENCOUNTER — HOSPITAL ENCOUNTER (OUTPATIENT)
Dept: LAB | Facility: MEDICAL CENTER | Age: 84
End: 2024-07-22
Attending: FAMILY MEDICINE
Payer: MEDICARE

## 2024-07-22 DIAGNOSIS — E03.9 ACQUIRED HYPOTHYROIDISM: ICD-10-CM

## 2024-07-22 DIAGNOSIS — N18.31 STAGE 3A CHRONIC KIDNEY DISEASE: ICD-10-CM

## 2024-07-22 LAB
CREAT UR-MCNC: 194.32 MG/DL
ERYTHROCYTE [DISTWIDTH] IN BLOOD BY AUTOMATED COUNT: 51 FL (ref 35.9–50)
HCT VFR BLD AUTO: 46.2 % (ref 37–47)
HGB BLD-MCNC: 14.9 G/DL (ref 12–16)
MCH RBC QN AUTO: 29 PG (ref 27–33)
MCHC RBC AUTO-ENTMCNC: 32.3 G/DL (ref 32.2–35.5)
MCV RBC AUTO: 89.9 FL (ref 81.4–97.8)
MICROALBUMIN UR-MCNC: 5.5 MG/DL
MICROALBUMIN/CREAT UR: 28 MG/G (ref 0–30)
PLATELET # BLD AUTO: 291 K/UL (ref 164–446)
PMV BLD AUTO: 10.9 FL (ref 9–12.9)
RBC # BLD AUTO: 5.14 M/UL (ref 4.2–5.4)
TSH SERPL DL<=0.005 MIU/L-ACNC: 13.8 UIU/ML (ref 0.38–5.33)
WBC # BLD AUTO: 7.1 K/UL (ref 4.8–10.8)

## 2024-07-22 PROCEDURE — 82570 ASSAY OF URINE CREATININE: CPT

## 2024-07-22 PROCEDURE — 85027 COMPLETE CBC AUTOMATED: CPT

## 2024-07-22 PROCEDURE — 84439 ASSAY OF FREE THYROXINE: CPT

## 2024-07-22 PROCEDURE — 84443 ASSAY THYROID STIM HORMONE: CPT

## 2024-07-22 PROCEDURE — 82043 UR ALBUMIN QUANTITATIVE: CPT

## 2024-07-22 PROCEDURE — 36415 COLL VENOUS BLD VENIPUNCTURE: CPT

## 2024-07-23 LAB — T4 FREE SERPL-MCNC: 1.08 NG/DL (ref 0.93–1.7)

## 2024-08-22 ENCOUNTER — TELEPHONE (OUTPATIENT)
Dept: HEALTH INFORMATION MANAGEMENT | Facility: OTHER | Age: 84
End: 2024-08-22

## 2024-09-05 DIAGNOSIS — E03.9 ACQUIRED HYPOTHYROIDISM: ICD-10-CM

## 2024-10-14 ENCOUNTER — HOSPITAL ENCOUNTER (OUTPATIENT)
Dept: LAB | Facility: MEDICAL CENTER | Age: 84
End: 2024-10-14
Attending: FAMILY MEDICINE
Payer: MEDICARE

## 2024-10-14 DIAGNOSIS — E03.9 ACQUIRED HYPOTHYROIDISM: ICD-10-CM

## 2024-10-14 LAB
T4 FREE SERPL-MCNC: 1.02 NG/DL (ref 0.93–1.7)
TSH SERPL DL<=0.005 MIU/L-ACNC: 10.1 UIU/ML (ref 0.38–5.33)

## 2024-10-14 PROCEDURE — 84439 ASSAY OF FREE THYROXINE: CPT

## 2024-10-14 PROCEDURE — 84443 ASSAY THYROID STIM HORMONE: CPT

## 2024-10-14 PROCEDURE — 36415 COLL VENOUS BLD VENIPUNCTURE: CPT

## 2024-12-09 ENCOUNTER — PATIENT OUTREACH (OUTPATIENT)
Dept: HEALTH INFORMATION MANAGEMENT | Facility: OTHER | Age: 84
End: 2024-12-09
Payer: MEDICARE

## 2024-12-09 NOTE — PROGRESS NOTES
12/09/2024  [facility] REJI received a request from [facility] REJI to assist pt's family with community resources. Pt has dementia    @[id number] REJI called Yoselyn, pt's stepdaughter and POA. No answer, left VM with contact information.    Next scheduled outreach 12/13/2024 if not hear from Yoselyn back earlier.    12/13/2024  REJI sent a message to the pt through My Chart. Contact information was provided.    12/13/2024  @[email or web address] REJI received a response from Yoselyn through My Chart. She is planing to contact this SW, was busy.  Waiting for the call.

## 2024-12-11 DIAGNOSIS — E03.9 ACQUIRED HYPOTHYROIDISM: ICD-10-CM

## 2024-12-11 NOTE — PROGRESS NOTES
Patient doesn't take levothyroxine on an empty stomach and never will. Previously increased dose to 150 mcg based on past labs. Repeat TSH ordered.

## 2024-12-13 ENCOUNTER — PATIENT MESSAGE (OUTPATIENT)
Dept: HEALTH INFORMATION MANAGEMENT | Facility: OTHER | Age: 84
End: 2024-12-13

## 2025-01-10 ENCOUNTER — HOSPITAL ENCOUNTER (OUTPATIENT)
Dept: LAB | Facility: MEDICAL CENTER | Age: 85
End: 2025-01-10
Attending: FAMILY MEDICINE
Payer: MEDICARE

## 2025-01-10 ENCOUNTER — OFFICE VISIT (OUTPATIENT)
Dept: NEUROLOGY | Facility: MEDICAL CENTER | Age: 85
End: 2025-01-10
Attending: PSYCHIATRY & NEUROLOGY
Payer: MEDICARE

## 2025-01-10 ENCOUNTER — PATIENT OUTREACH (OUTPATIENT)
Dept: HEALTH INFORMATION MANAGEMENT | Facility: OTHER | Age: 85
End: 2025-01-10
Payer: MEDICARE

## 2025-01-10 VITALS
TEMPERATURE: 97.6 F | SYSTOLIC BLOOD PRESSURE: 142 MMHG | OXYGEN SATURATION: 94 % | HEIGHT: 69 IN | HEART RATE: 59 BPM | WEIGHT: 151 LBS | DIASTOLIC BLOOD PRESSURE: 70 MMHG | RESPIRATION RATE: 16 BRPM | BODY MASS INDEX: 22.36 KG/M2

## 2025-01-10 DIAGNOSIS — R03.0 ELEVATED BLOOD PRESSURE READING: ICD-10-CM

## 2025-01-10 DIAGNOSIS — G30.1 MILD LATE ONSET ALZHEIMER'S DEMENTIA WITH AGITATION (HCC): Primary | ICD-10-CM

## 2025-01-10 DIAGNOSIS — E03.9 ACQUIRED HYPOTHYROIDISM: ICD-10-CM

## 2025-01-10 DIAGNOSIS — F02.A11 MILD LATE ONSET ALZHEIMER'S DEMENTIA WITH AGITATION (HCC): Primary | ICD-10-CM

## 2025-01-10 DIAGNOSIS — G47.33 OSA (OBSTRUCTIVE SLEEP APNEA): ICD-10-CM

## 2025-01-10 LAB — TSH SERPL DL<=0.005 MIU/L-ACNC: 2.47 UIU/ML (ref 0.38–5.33)

## 2025-01-10 PROCEDURE — 99212 OFFICE O/P EST SF 10 MIN: CPT | Performed by: PSYCHIATRY & NEUROLOGY

## 2025-01-10 PROCEDURE — 36415 COLL VENOUS BLD VENIPUNCTURE: CPT

## 2025-01-10 PROCEDURE — 3078F DIAST BP <80 MM HG: CPT | Performed by: PSYCHIATRY & NEUROLOGY

## 2025-01-10 PROCEDURE — 99417 PROLNG OP E/M EACH 15 MIN: CPT | Performed by: PSYCHIATRY & NEUROLOGY

## 2025-01-10 PROCEDURE — 84443 ASSAY THYROID STIM HORMONE: CPT

## 2025-01-10 PROCEDURE — 3077F SYST BP >= 140 MM HG: CPT | Performed by: PSYCHIATRY & NEUROLOGY

## 2025-01-10 PROCEDURE — 99215 OFFICE O/P EST HI 40 MIN: CPT | Performed by: PSYCHIATRY & NEUROLOGY

## 2025-01-10 ASSESSMENT — PATIENT HEALTH QUESTIONNAIRE - PHQ9: CLINICAL INTERPRETATION OF PHQ2 SCORE: 0

## 2025-01-10 ASSESSMENT — FIBROSIS 4 INDEX: FIB4 SCORE: 1.1

## 2025-01-10 NOTE — PROGRESS NOTES
01/10/2025    @[id number] REJI called Yoselyn to follow up if assistance is still needed. Yoselyn informed this SW that she is busy now, trying to get pt to her neurology appointment and pt is resistant. Agreed on Yoselyn sending this SW a message with time that might work for her to discuss needs.    01/15/2025  This Sw didn't hear back from the family. Chart review of neurology appointment shows that pt has some caregiver support.   Sw will close current referral due to inactivity. Contact information for further assistance if needed was provided. Referral is closed.

## 2025-01-10 NOTE — PROGRESS NOTES
"Neuro follow up:     Reason: Mild Stage (Late Onset)- Alzheimer's Disease      Bernie Barrientos 84 y.o. right handed woman who lives in [facility] for nearly 15 years. She is originally from [place]. Her step daughter has lived with Bernie for nearly 5 years. She was a  and a  in [place].     Problem List reviewed:     Bernie  is here with  Yoselyn (Step Daughter) who lives with Bernie and has done so for the last 8 years or so.    Starting around January 20th 2025, Yoselyn is going to drive back to [place] for 4 months and then periodically return back to [place] to check on Bernie. She will have a part time caregiver 5-6 days per week (for 4-6 hours)> making lunch and dinner and will take her to appointments.     Bernie has minimally noticed difficulty remembering specific information in conversation and this became noticeable to her about 4 years.   She was not able to be more specific about her limitations.    She frequently will continue to ask the same questions of Yoselyn within 10-15 minutes in the last 12 months.     Bernie still does not feel that the memory disturbance(s) interfere with daily functioning. She is confident in remembering the month/year and even the day of the week (over the last 6 months or longer) and does not feel she needs to rely on the calendar all the time.     Yoselyn has noticed since the last visit that Bernie is still easily agitated after just after being asking \"any question\"- this occurs about a few days of week  and the periods will be a few hours long.     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     Bernie tends to repeat herself nearly every day (atleast 2 times) over the last 12 months> she is not always aware she is doing this per Yoselyn.     Yoselyn has noticed she seems \"in the present moment\" when carrying on a conversation with her family members lasting 60-90 minutes but not so much in person.     Intellectual ability seems well maintained to Yoselyn in the last 6 months.     There is no history of concussion(s),seizure(s) or seizure like events nor any documented events in the Problem List to suggest such an issue.     She has stopped driving.    Bernie has not had any  postural dizziness/faintness episodes in the last few months.    Bernie  denies any headache(s),visual disturbances of any sort or kind, sensory changes of the limbs or body nor any involuntary movements of the limb or body in the last 3-6 months.     ADL(s) preserved in her view including not having any accidents/incidents when driving in the last 12 months or longer nor any problems with making meals for self, shopping,cleaning her house, using her electronic devices, using her computer to create and send emails and she likes [employer] nearly every day (1-2 days) and continues to do that.     Average sleep- 7 hours most nights per week most nights; occasionally will awaken at night and will urinate. Denies snoring,grunting,gasping or self arousals nor REM Sleep Behavioral issues.      Mood has been stable per Bernie and Yoselyn  and rarely has she felt depressed nor has she had any formal psychiatric evaluations for mood type issues.      Family Hx:  Father  at a young age (in his 40's)  Mother: unclear medical condition(s) known;  over age 70.  Sister: age 88> no cognitive issues known (lives outside [facility])    FREE THYROXINE  Order: [id number] - Reflex for Order [id number]   Status: Final result       Visible to patient: Yes (seen)       Next appt: 2025 at 12:00 PM in Lab ([facility])    1 Result Note       1 " Patient Communication          Component  Ref Range & Units 2 mo ago 5 mo ago 11 yr ago 12 yr ago 14 yr ago   Free T-4  0.93 - 1.70 ng/dL 1.02 1.08 0.99 R 1.33 R 1.04 R   Resulting Agency M M M PD M          1 Result Note       1 Patient Communication            Component  Ref Range & Units 2 mo ago 5 mo ago 10 mo ago 2 yr ago 3 yr ago 4 yr ago 5 yr ago   TSH  0.380 - 5.330 uIU/mL 10.100 High  13.800 High  CM 5.570 High  CM 1.700 CM 0.920 CM                Mild Stage or Degree of Alzheimer's Disease.     Supported by formal Neuropsychological testing results done in the last year.    MOCA score today of 13/30 - see media section for specifics.    Alzheimer's Questionnaire per Yoselyn today of 21- see media section for specifics.    Functional Activity Questionnaire score per Yoselyn today of 26- see media section for specifics.     I had a long conversation about the diagnosis of Alzheimer's Disease and what Dementia actually means in the long run.     Today, I reviewed the clinical criteria and reviewed several  scenarios of the differences being using the medical terms of normal brain aging (age associated memory impairment),  Mild Cognitive Impairment (MCI) and Dementia.    2.  Elevated BP without known HTN- borderline elevation today.    3.  O.S.A.- had not been able to use a CPAP machine because she feels like she was suffocating.        Dementia  is a syndrome but statistically and for the majority of patients  occurs due  to a more rapid aging of the brain tissue or potentially from injury to certain parts of one's brain ( often from such contributing factors as  the cumulative effects of alcohol, from one or more ischemic or hemmorhagic stroke(s), from neurodegeneration or long standing with/without suboptimally controlled Hypertension). It is for some of these potential factors as to why I recommend a brain imaging test (Head CT or Brain MRI) be done for the 1st time or in certain circumstances repeated for  "comparison purposes  as such imaging can suggest one or more factors as to the reason(s) for the person's cognitive/memory changes. In fact, a normal or \"age related\" finding on a brain imaging test in and of itself is useful clinical and objective information to have in the evaluation of a person who has either an acute, evolving or even chronic (months to years) long cognitive/memory complaint.     Additional factors or contributors to Brain Health issues can be summarized in several books/references which I discussed as well today.      Goals going forwards include:     A. Paying attention to one's risk factors and reducing their prevalence or potential impact on one's changing memory/thinking> an excellent example would be to stop smoking, reduce or eliminate alcohol use (depending on the amount and frequency of usage), maintain good blood pressure control by buying a digital arm blood pressure cuff such as an OMRON Series 3 or 5 and checking one's blood pressure atleast 3 days per week (in the am and early afternoon) that the numbers are under 140/90 and working as needed with the primary care doctor  to optimize blood pressure control).     B. Encouraging proper sleep hygiene which for most adults is 7 to 8 hours of uninterrupted sleep per night.       C. Encouraging some form of exercise preferable aerobic forms to be done (4 to 5 days per week- 30 minutes minimum per day)> 150 minutes per week as a goal. Example activities could include fast walking (up a slight incline), jogging, cycling (road or stationary), swimming,tennis. Essentially, even basic walking on a flat surface or a treadmill would be better than doing nothing.     D. Maintaining or forming new social contacts with family and friends  and a positive attitude despite the concerns and/or ongoing issues with thinking and/or memory.     E. Eating well which means a diet low in salt  (under 2 grams per day), sugar and saturated fat.     F. " Maintaining one's BMI (Body Mass Index) under 30.     G. Consideration of the use of cognitive enhancers (acetylcholinerase inhibitors such as Aricept vs an NMDA Receptor agent like Namenda).  We discussed Pros and cons of such compounds in terms of predicted efficacy and side effects profiles.    I gave Yoselyn/Bernie some additional information about Aricept and Memantine today and they will discuss amongst themselves and get back to me if Bernie wishes to pursue using this medication.  I specifically reviewed the requirement of checking the heart rate daily for the 1st week and then if the dose is increased to 10 mg a day (as a maintenance dose) for another 1 week. And if the HR is below 50 beats per minute or Bernie feels faint,lightheaded or dizzy the need to stop the medication immediately.     H. Will hold off on using anti amyloid compounds  (Leqembi and Kinsunla)  after our discussion today of these compounds.    I. Thyroid studies being rechecked on January 13th 2025 per primary care provider.     I. Encouraged attention to diet and to reduce sugary foods in diet.     J. She is no longer driving at this point.    K. We discussed the website Dementia [facility] [place] website today.     L. I placed a [facility] to [facility] to  have her re-evaluated for SAMY and the necessary Rx.    M. I strongly encouraged periodic blood pressure checks at home with goal under 130/80 long term.    I have performed  a history and physical exam and a directed /focused  ROS today.     Total time spent today or this patient's care was 55  minutes  and included reviewing  the diagnostic workup to date (such as labs and imaging as well as interpreting such tests relevant to this patient's neurological condition),  reviewing/obtaining separately obtained history (from patient and/or accompanying live in friend Yoselyn)  for today's neurological problem(s) ,counseling and educating the patient and family member on issues related to  cognition/memory and cognitive health factors and documenting  the clinical information in the EMR.     Follow up with me in about 6 months or so.

## 2025-01-13 ENCOUNTER — APPOINTMENT (OUTPATIENT)
Dept: LAB | Facility: MEDICAL CENTER | Age: 85
End: 2025-01-13
Payer: MEDICARE

## 2025-01-15 DIAGNOSIS — F33.41 RECURRENT MAJOR DEPRESSIVE DISORDER, IN PARTIAL REMISSION (HCC): ICD-10-CM

## 2025-01-15 RX ORDER — LEVOTHYROXINE SODIUM 150 UG/1
150 TABLET ORAL
Qty: 100 TABLET | Refills: 0 | Status: SHIPPED | OUTPATIENT
Start: 2025-01-15

## 2025-01-15 RX ORDER — SERTRALINE HYDROCHLORIDE 100 MG/1
200 TABLET, FILM COATED ORAL DAILY
Qty: 200 TABLET | Refills: 0 | Status: SHIPPED | OUTPATIENT
Start: 2025-01-15

## 2025-01-15 NOTE — TELEPHONE ENCOUNTER
Received request via: Patient    Was the patient seen in the last year in this department? Yes    Does the patient have an active prescription (recently filled or refills available) for medication(s) requested? No    Pharmacy Name: [facility] [address] [place] [address], [place] - [id number]     Does the patient have jail Plus and need 100-day supply? (This applies to ALL medications) Yes, quantity updated to 100 days

## 2025-01-27 SDOH — HEALTH STABILITY: PHYSICAL HEALTH: ON AVERAGE, HOW MANY DAYS PER WEEK DO YOU ENGAGE IN MODERATE TO STRENUOUS EXERCISE (LIKE A BRISK WALK)?: 0 DAYS

## 2025-01-27 SDOH — HEALTH STABILITY: MENTAL HEALTH

## 2025-01-27 SDOH — ECONOMIC STABILITY: FOOD INSECURITY: WITHIN THE PAST 12 MONTHS, THE FOOD YOU BOUGHT JUST DIDN'T LAST AND YOU DIDN'T HAVE MONEY TO GET MORE.: NEVER TRUE

## 2025-01-27 SDOH — ECONOMIC STABILITY: HOUSING INSECURITY
IN THE LAST 12 MONTHS, WAS THERE A TIME WHEN YOU DID NOT HAVE A STEADY PLACE TO SLEEP OR SLEPT IN A SHELTER (INCLUDING NOW)?: NO

## 2025-01-27 SDOH — ECONOMIC STABILITY: INCOME INSECURITY: HOW HARD IS IT FOR YOU TO PAY FOR THE VERY BASICS LIKE FOOD, HOUSING, MEDICAL CARE, AND HEATING?: NOT VERY HARD

## 2025-01-27 SDOH — HEALTH STABILITY: PHYSICAL HEALTH: ON AVERAGE, HOW MANY MINUTES DO YOU ENGAGE IN EXERCISE AT THIS LEVEL?: 0 MIN

## 2025-01-27 SDOH — ECONOMIC STABILITY: FOOD INSECURITY: WITHIN THE PAST 12 MONTHS, YOU WORRIED THAT YOUR FOOD WOULD RUN OUT BEFORE YOU GOT MONEY TO BUY MORE.: NEVER TRUE

## 2025-01-27 SDOH — ECONOMIC STABILITY: TRANSPORTATION INSECURITY
IN THE PAST 12 MONTHS, HAS THE LACK OF TRANSPORTATION KEPT YOU FROM MEDICAL APPOINTMENTS OR FROM GETTING MEDICATIONS?: NO

## 2025-01-27 SDOH — ECONOMIC STABILITY: INCOME INSECURITY: IN THE LAST 12 MONTHS, WAS THERE A TIME WHEN YOU WERE NOT ABLE TO PAY THE MORTGAGE OR RENT ON TIME?: NO

## 2025-01-27 SDOH — ECONOMIC STABILITY: TRANSPORTATION INSECURITY
IN THE PAST 12 MONTHS, HAS LACK OF TRANSPORTATION KEPT YOU FROM MEETINGS, WORK, OR FROM GETTING THINGS NEEDED FOR DAILY LIVING?: NO

## 2025-01-27 SDOH — ECONOMIC STABILITY: TRANSPORTATION INSECURITY
IN THE PAST 12 MONTHS, HAS LACK OF RELIABLE TRANSPORTATION KEPT YOU FROM MEDICAL APPOINTMENTS, MEETINGS, WORK OR FROM GETTING THINGS NEEDED FOR DAILY LIVING?: NO

## 2025-01-27 ASSESSMENT — SOCIAL DETERMINANTS OF HEALTH (SDOH)
HOW OFTEN DO YOU ATTENT MEETINGS OF THE CLUB OR ORGANIZATION YOU BELONG TO?: NEVER
HOW OFTEN DO YOU GET TOGETHER WITH FRIENDS OR RELATIVES?: ONCE A WEEK
HOW OFTEN DO YOU ATTEND CHURCH OR RELIGIOUS SERVICES?: NEVER
HOW OFTEN DO YOU ATTEND CHURCH OR RELIGIOUS SERVICES?: NEVER
HOW OFTEN DO YOU ATTENT MEETINGS OF THE CLUB OR ORGANIZATION YOU BELONG TO?: NEVER
IN THE PAST 12 MONTHS, HAS THE ELECTRIC, GAS, OIL, OR WATER COMPANY THREATENED TO SHUT OFF SERVICE IN YOUR HOME?: NO
DO YOU BELONG TO ANY CLUBS OR ORGANIZATIONS SUCH AS CHURCH GROUPS UNIONS, FRATERNAL OR ATHLETIC GROUPS, OR SCHOOL GROUPS?: NO
IN A TYPICAL WEEK, HOW MANY TIMES DO YOU TALK ON THE PHONE WITH FAMILY, FRIENDS, OR NEIGHBORS?: NEVER
HOW OFTEN DO YOU HAVE SIX OR MORE DRINKS ON ONE OCCASION: NEVER
HOW OFTEN DO YOU GET TOGETHER WITH FRIENDS OR RELATIVES?: ONCE A WEEK
HOW OFTEN DO YOU HAVE A DRINK CONTAINING ALCOHOL: MONTHLY OR LESS
IN A TYPICAL WEEK, HOW MANY TIMES DO YOU TALK ON THE PHONE WITH FAMILY, FRIENDS, OR NEIGHBORS?: NEVER
DO YOU BELONG TO ANY CLUBS OR ORGANIZATIONS SUCH AS CHURCH GROUPS UNIONS, FRATERNAL OR ATHLETIC GROUPS, OR SCHOOL GROUPS?: NO
HOW HARD IS IT FOR YOU TO PAY FOR THE VERY BASICS LIKE FOOD, HOUSING, MEDICAL CARE, AND HEATING?: NOT VERY HARD
WITHIN THE PAST 12 MONTHS, YOU WORRIED THAT YOUR FOOD WOULD RUN OUT BEFORE YOU GOT THE MONEY TO BUY MORE: NEVER TRUE
HOW MANY DRINKS CONTAINING ALCOHOL DO YOU HAVE ON A TYPICAL DAY WHEN YOU ARE DRINKING: PATIENT DOES NOT DRINK

## 2025-01-27 ASSESSMENT — LIFESTYLE VARIABLES
HOW OFTEN DO YOU HAVE A DRINK CONTAINING ALCOHOL: MONTHLY OR LESS
HOW MANY STANDARD DRINKS CONTAINING ALCOHOL DO YOU HAVE ON A TYPICAL DAY: PATIENT DOES NOT DRINK
SKIP TO QUESTIONS 9-10: 1
HOW OFTEN DO YOU HAVE SIX OR MORE DRINKS ON ONE OCCASION: NEVER
AUDIT-C TOTAL SCORE: 1

## 2025-01-28 ENCOUNTER — OFFICE VISIT (OUTPATIENT)
Dept: MEDICAL GROUP | Facility: MEDICAL CENTER | Age: 85
End: 2025-01-28
Payer: MEDICARE

## 2025-01-28 VITALS
BODY MASS INDEX: 21.92 KG/M2 | SYSTOLIC BLOOD PRESSURE: 122 MMHG | HEIGHT: 69 IN | HEART RATE: 63 BPM | TEMPERATURE: 98.3 F | OXYGEN SATURATION: 96 % | DIASTOLIC BLOOD PRESSURE: 72 MMHG | WEIGHT: 148 LBS | RESPIRATION RATE: 17 BRPM

## 2025-01-28 DIAGNOSIS — G30.1 MILD LATE ONSET ALZHEIMER'S DEMENTIA WITH AGITATION (HCC): ICD-10-CM

## 2025-01-28 DIAGNOSIS — E03.9 ACQUIRED HYPOTHYROIDISM: ICD-10-CM

## 2025-01-28 DIAGNOSIS — F33.41 RECURRENT MAJOR DEPRESSIVE DISORDER, IN PARTIAL REMISSION (HCC): ICD-10-CM

## 2025-01-28 DIAGNOSIS — N18.31 CHRONIC RENAL FAILURE, STAGE 3A: ICD-10-CM

## 2025-01-28 DIAGNOSIS — F02.A11 MILD LATE ONSET ALZHEIMER'S DEMENTIA WITH AGITATION (HCC): ICD-10-CM

## 2025-01-28 PROCEDURE — 3078F DIAST BP <80 MM HG: CPT | Performed by: STUDENT IN AN ORGANIZED HEALTH CARE EDUCATION/TRAINING PROGRAM

## 2025-01-28 PROCEDURE — 3074F SYST BP LT 130 MM HG: CPT | Performed by: STUDENT IN AN ORGANIZED HEALTH CARE EDUCATION/TRAINING PROGRAM

## 2025-01-28 PROCEDURE — 99214 OFFICE O/P EST MOD 30 MIN: CPT | Performed by: STUDENT IN AN ORGANIZED HEALTH CARE EDUCATION/TRAINING PROGRAM

## 2025-01-28 ASSESSMENT — ENCOUNTER SYMPTOMS
PALPITATIONS: 0
NAUSEA: 0
CHILLS: 0
DIZZINESS: 0
SHORTNESS OF BREATH: 0
WHEEZING: 0
WEIGHT LOSS: 0
VOMITING: 0
FEVER: 0
HEADACHES: 0

## 2025-01-28 ASSESSMENT — FIBROSIS 4 INDEX: FIB4 SCORE: 1.1
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
## 2025-02-10 ENCOUNTER — PATIENT OUTREACH (OUTPATIENT)
Dept: HEALTH INFORMATION MANAGEMENT | Facility: OTHER | Age: 85
End: 2025-02-10
Payer: MEDICARE

## 2025-02-10 ENCOUNTER — PATIENT MESSAGE (OUTPATIENT)
Dept: HEALTH INFORMATION MANAGEMENT | Facility: OTHER | Age: 85
End: 2025-02-10

## 2025-02-10 NOTE — PROGRESS NOTES
02/10/2025  @[id number] [facility] Received a VM  from Yoselyn, pt's DPOA stating that she needs help with her stepmother who has [place]. Disease.   This SW received a referral to assist Yoselyn before but wasn't able to reach out.    @0923 SW called Yoselyn. [facility] SW assessment was conducted, areas of needs identified.  Social Work Assessment  Community Care Management    Synopsis: Pt lives alone, has dementia. Her stepdaughter Yoselyn is her primary care giver. Yoselyn hopes to leave for a few weeks to have break and some rest.    Living Situation/Home Environment: Pt lives in a private residence, Yoselyn stated that house is clean and free of clutter.  Discussed  Safety home assessment and security cameras. Yoselyn bought cameras and plans to install them. Would like to get information on [facility] services for home safety check and repairs if needed including kitchen and bathroom.  Help at home  Currently 2 people from [employer] [facility] come for 2 hours to cook dinner and check on pt. Advised to reach out to other agencies specializing in care for dementia pt ([facility] [facility], [facility], [facility], Home instead). To create a care plan with longer hours.  Yoselyn is aware of [employer] resources and worked with them in the past.  3. Placement    Pt wishes to stay home and Yoselyn would like to try to keep her at home as long as possible.  Yoselyn is aware of short-term respite placement but stated it is too expensive.  Yoselyn hopes to keep pt home but understands that she might need high level of care. Advised to reach out to the provider for letter of capacity for decision making if/when needed.  Financial Situation/Sources of Income: Pt receives [employer] (numbers not disclosed), has 200K in savings, owns her home. Pt will not qualify for any state or Critical access hospital assistance.  Transportation: Pt stopped driving.  Support System: Yoselyn is pt's primary support.  Mental Health/Substance Abuse Hx: Pt doesn't drink or take elicit drugs.No signs of  depression, no SI/Hi. Advised to contact psychiatry or [facility] for possible medication management as pt shows some aggressive behaviors. SW to provide contact information.  Ability to Obtain Basic Resources: Pt denies her diagnosis, refuses to accept help.Pt needs reminders to take medications, can't manage finances.  Physical Functioning: No issues identified. Pt doesn't cook, forgets to eat, needs reminders.Independent with ADLs.  Patient's Perception of Needs: Pt denies any issues. Yoselyn is concerned that she is getting tired and burned out and hopes to find help. Briefly discussed care giver burn out, advised to reach for counseling if /when ready.  AD Discussion?: Pt has AD, Yoselyn is DPOA    Plan: SW will send following resources through My chart  In-home help ([facility], [facility], [facility], home instead)  Contact information for At your service ([facility] services)  [facility] program    Goal:  Find assistance for stable help at home so Yoselyn can have a break from care giving.        Barriers:  Pt denies her condition, refuses help and possible placement.  Interventions: provide resources, assist with care coordination if needed.     Start Date: 02/10/2025  Anticipated Goal Achievement Date:  TBD        Next Scheduled patient outreach:  02/24/2025  Social Work Care Coordinator:  Savana Slade  Community Care Management:  [phone]    @[id number] REJI sent a message through My Chart with resources requested.

## 2025-02-17 ENCOUNTER — PATIENT MESSAGE (OUTPATIENT)
Dept: HEALTH INFORMATION MANAGEMENT | Facility: OTHER | Age: 85
End: 2025-02-17

## 2025-02-21 ENCOUNTER — TELEPHONE (OUTPATIENT)
Dept: MEDICAL GROUP | Facility: MEDICAL CENTER | Age: 85
End: 2025-02-21
Payer: MEDICARE

## 2025-02-22 NOTE — TELEPHONE ENCOUNTER
Pt family member Yoselyn called and LVM stating that pt has been having knee pain and requesting to know if she need an appointment with  or does she need to see a specialist

## 2025-02-26 NOTE — PROGRESS NOTES
02/26/2025  Follow up call to Yoselyn. Yoselyn stated she is tired as the agency they are using have a lot of changes, constant new caregivers with no clue on dementia patients. Advised to reach out to other agencies provided by this SW and discuss issues upfront including stable staffing and dementia care. Yoselyn plans to do it.   Yoselyn thanked this SW and stated no other issues that this SW can assist with.   Plan  SW will close current referral as resources requested were provided, no further assistance was requested. Contact information for future assistance  if needed was provided.

## 2025-02-28 ENCOUNTER — PATIENT MESSAGE (OUTPATIENT)
Dept: HEALTH INFORMATION MANAGEMENT | Facility: OTHER | Age: 85
End: 2025-02-28

## 2025-03-03 ENCOUNTER — TELEPHONE (OUTPATIENT)
Dept: NEUROLOGY | Facility: MEDICAL CENTER | Age: 85
End: 2025-03-03
Payer: MEDICARE

## 2025-03-03 ENCOUNTER — PATIENT MESSAGE (OUTPATIENT)
Dept: NEUROLOGY | Facility: MEDICAL CENTER | Age: 85
End: 2025-03-03
Payer: MEDICARE

## 2025-03-03 ENCOUNTER — HOSPITAL ENCOUNTER (OUTPATIENT)
Facility: MEDICAL CENTER | Age: 85
End: 2025-03-03
Payer: MEDICARE

## 2025-03-03 ENCOUNTER — OFFICE VISIT (OUTPATIENT)
Dept: URGENT CARE | Facility: CLINIC | Age: 85
End: 2025-03-03
Payer: MEDICARE

## 2025-03-03 VITALS
SYSTOLIC BLOOD PRESSURE: 156 MMHG | DIASTOLIC BLOOD PRESSURE: 76 MMHG | TEMPERATURE: 96.4 F | RESPIRATION RATE: 18 BRPM | HEART RATE: 66 BPM | WEIGHT: 149 LBS | HEIGHT: 68 IN | OXYGEN SATURATION: 93 % | BODY MASS INDEX: 22.58 KG/M2

## 2025-03-03 DIAGNOSIS — N30.00 ACUTE CYSTITIS WITHOUT HEMATURIA: ICD-10-CM

## 2025-03-03 DIAGNOSIS — R30.0 DYSURIA: ICD-10-CM

## 2025-03-03 LAB
APPEARANCE UR: NORMAL
BILIRUB UR STRIP-MCNC: NORMAL MG/DL
COLOR UR AUTO: YELLOW
GLUCOSE UR STRIP.AUTO-MCNC: NEGATIVE MG/DL
KETONES UR STRIP.AUTO-MCNC: NORMAL MG/DL
LEUKOCYTE ESTERASE UR QL STRIP.AUTO: NORMAL
NITRITE UR QL STRIP.AUTO: POSITIVE
PH UR STRIP.AUTO: 5.5 [PH] (ref 5–8)
PROT UR QL STRIP: 100 MG/DL
RBC UR QL AUTO: NEGATIVE
SP GR UR STRIP.AUTO: 1.02
UROBILINOGEN UR STRIP-MCNC: 1 MG/DL

## 2025-03-03 PROCEDURE — 87086 URINE CULTURE/COLONY COUNT: CPT

## 2025-03-03 PROCEDURE — 87077 CULTURE AEROBIC IDENTIFY: CPT

## 2025-03-03 PROCEDURE — 3078F DIAST BP <80 MM HG: CPT

## 2025-03-03 PROCEDURE — 99213 OFFICE O/P EST LOW 20 MIN: CPT

## 2025-03-03 PROCEDURE — 81002 URINALYSIS NONAUTO W/O SCOPE: CPT

## 2025-03-03 PROCEDURE — 3077F SYST BP >= 140 MM HG: CPT

## 2025-03-03 PROCEDURE — 87186 SC STD MICRODIL/AGAR DIL: CPT

## 2025-03-03 RX ORDER — SULFAMETHOXAZOLE AND TRIMETHOPRIM 800; 160 MG/1; MG/1
1 TABLET ORAL 2 TIMES DAILY
Qty: 14 TABLET | Refills: 0 | Status: SHIPPED | OUTPATIENT
Start: 2025-03-03 | End: 2025-03-10

## 2025-03-03 ASSESSMENT — FIBROSIS 4 INDEX: FIB4 SCORE: 1.1

## 2025-03-03 NOTE — TELEPHONE ENCOUNTER
Spoke to pt. Caregiver on the phone regarding pt. UTI Test. I let her know that we do not do Urinary Test here in Neurology. They will have to go to urgent care or make an appointment with pt. Primary care. I suggested calling scheduling and making a virtual visit appointment as caregiver states the pt. Wont leave the house.  Jenna Schilling, Med Ass't

## 2025-03-03 NOTE — TELEPHONE ENCOUNTER
Luz Topete the PT caretaker called to get a order for a possible UTI test kit. The PT wont get up so she need it sent to them and pls give a call to the caretaker at [phone]   3/3/25   11:18 AM

## 2025-03-04 NOTE — PROGRESS NOTES
"Chief Complaint   Patient presents with    Other     Behavior change, suspecting a UTI, x9days         Subjective:   HISTORY OF PRESENT ILLNESS: Bernie Barrientos is a 84 y.o. female who presents for suspected UTI. She is brought in by her daughter as she has been having more anger outburst than normal.  She does have a hx of dementia.  Patient denies ay urinary dicomfort, she states she feels fine.  Daughter denies any fever, vomiting, complaints of abdominal pain or flank pain.     Medications, Allergies, current problem list, Social and Family history reviewed today in Epic.     Objective:     BP (!) 156/76 (BP Location: Left arm, Patient Position: Sitting, BP Cuff Size: Adult)   Pulse 66   Temp (!) 35.8 °C (96.4 °F) (Temporal)   Resp 18   Ht 1.727 m (5' 8\")   Wt 67.6 kg (149 lb)   SpO2 93%     Physical Exam  Vitals reviewed.   Constitutional:       General: She is not in acute distress.     Appearance: Normal appearance. She is not ill-appearing or toxic-appearing.   HENT:      Mouth/Throat:      Mouth: Mucous membranes are moist.   Cardiovascular:      Rate and Rhythm: Normal rate.   Pulmonary:      Effort: Pulmonary effort is normal.   Abdominal:      Palpations: Abdomen is soft.      Tenderness: There is no abdominal tenderness. There is no right CVA tenderness, left CVA tenderness, guarding or rebound.   Genitourinary:     Comments: Deferred per pt  Skin:     General: Skin is warm and dry.      Capillary Refill: Capillary refill takes less than 2 seconds.   Neurological:      Mental Status: She is alert and oriented to person, place, and time.   Psychiatric:         Mood and Affect: Mood normal.          Assessment/Plan:     Diagnosis and associated orders    I personally reviewed prior external notes and test results pertinent to today's visit.     1. Acute cystitis without hematuria  POCT Urinalysis    URINE CULTURE(NEW)    sulfamethoxazole-trimethoprim (BACTRIM DS) 800-160 MG tablet        Results for " orders placed or performed in visit on 03/03/25   POCT Urinalysis    Collection Time: 03/03/25  4:33 PM   Result Value Ref Range    POC Color yellow Negative    POC Appearance cloudy Negative    POC Glucose negative Negative mg/dL    POC Bilirubin small Negative mg/dL    POC Ketones trace Negative mg/dL    POC Specific Gravity 1.025 <1.005 - >1.030    POC Blood negative Negative    POC Urine PH 5.5 5.0 - 8.0    POC Protein 100 Negative mg/dL    POC Urobiligen 1.0 Negative (0.2) mg/dL    POC Nitrites positive Negative    POC Leukocyte Esterase trace Negative     *Note: Due to a large number of results and/or encounters for the requested time period, some results have not been displayed. A complete set of results can be found in Results Review.        IMPRESSION: The patient is well appearing here with reassuring exam and vitals signs. Pt has had some anger out bursts, daughter states she gets like this with Uti, she castro have evidence here of a UTI, she is otherwise pleasant for me and in no acute distress.  They are discharged home with a course of bactrim.   Advised to push fluids, Er if anything is worsening or she is at risk to harm herself. .  Discussed anticipated duration of illness, return to work/school, and indications to RTC or go to the Emergency department.    Patient states understanding of the plan of care and discharge instructions.  They are discharged in stable condition.         Please note that this dictation was created using voice recognition software. I have made a reasonable attempt to correct obvious errors, but I expect that there are errors of grammar and possibly content that I did not discover before finalizing the note.    This note was electronically signed by GEORGES Horvath

## 2025-03-06 LAB
BACTERIA UR CULT: ABNORMAL
BACTERIA UR CULT: ABNORMAL
SIGNIFICANT IND 70042: ABNORMAL
SITE SITE: ABNORMAL
SOURCE SOURCE: ABNORMAL

## 2025-04-09 PROBLEM — M25.569 KNEE PAIN: Chronic | Status: ACTIVE | Noted: 2023-06-26

## 2025-04-09 PROBLEM — F02.A11 MILD LATE ONSET ALZHEIMER'S DEMENTIA WITH AGITATION (HCC): Chronic | Status: ACTIVE | Noted: 2024-02-28

## 2025-04-09 PROBLEM — I73.9 PERIPHERAL VASCULAR DISEASE, UNSPECIFIED (HCC): Chronic | Status: ACTIVE | Noted: 2023-08-04

## 2025-04-09 PROBLEM — E78.00 PURE HYPERCHOLESTEROLEMIA: Chronic | Status: ACTIVE | Noted: 2017-06-02

## 2025-04-09 PROBLEM — R03.0 ELEVATED BLOOD PRESSURE READING: Chronic | Status: ACTIVE | Noted: 2025-01-10

## 2025-04-09 PROBLEM — G30.1 MILD LATE ONSET ALZHEIMER'S DEMENTIA WITH AGITATION (HCC): Chronic | Status: ACTIVE | Noted: 2024-02-28

## 2025-04-10 PROBLEM — F02.B11 MODERATE LATE ONSET ALZHEIMER'S DEMENTIA WITH AGITATION (HCC): Status: ACTIVE | Noted: 2024-02-28

## 2025-04-24 PROBLEM — F02.B11 MODERATE LATE ONSET ALZHEIMER'S DEMENTIA WITH AGITATION (HCC): Chronic | Status: ACTIVE | Noted: 2024-02-28

## 2025-04-24 PROBLEM — R09.02 HYPOXIA: Chronic | Status: ACTIVE | Noted: 2023-06-26

## 2025-05-08 ENCOUNTER — HOSPITAL ENCOUNTER (INPATIENT)
Facility: MEDICAL CENTER | Age: 85
LOS: 3 days | DRG: 392 | End: 2025-05-11
Attending: EMERGENCY MEDICINE | Admitting: STUDENT IN AN ORGANIZED HEALTH CARE EDUCATION/TRAINING PROGRAM
Payer: MEDICARE

## 2025-05-08 ENCOUNTER — APPOINTMENT (OUTPATIENT)
Dept: RADIOLOGY | Facility: MEDICAL CENTER | Age: 85
DRG: 392 | End: 2025-05-08
Attending: EMERGENCY MEDICINE
Payer: MEDICARE

## 2025-05-08 DIAGNOSIS — G47.33 SLEEP APNEA, OBSTRUCTIVE: ICD-10-CM

## 2025-05-08 DIAGNOSIS — R01.1 HEART MURMUR: ICD-10-CM

## 2025-05-08 DIAGNOSIS — I73.9 PERIPHERAL VASCULAR DISEASE, UNSPECIFIED (HCC): Chronic | ICD-10-CM

## 2025-05-08 DIAGNOSIS — G30.1 MODERATE LATE ONSET ALZHEIMER'S DEMENTIA WITH AGITATION (HCC): Chronic | ICD-10-CM

## 2025-05-08 DIAGNOSIS — F03.90 DEMENTIA, UNSPECIFIED DEMENTIA SEVERITY, UNSPECIFIED DEMENTIA TYPE, UNSPECIFIED WHETHER BEHAVIORAL, PSYCHOTIC, OR MOOD DISTURBANCE OR ANXIETY (HCC): ICD-10-CM

## 2025-05-08 DIAGNOSIS — N18.31 CHRONIC RENAL FAILURE, STAGE 3A: ICD-10-CM

## 2025-05-08 DIAGNOSIS — R53.83 OTHER FATIGUE: ICD-10-CM

## 2025-05-08 DIAGNOSIS — R03.0 ELEVATED BLOOD PRESSURE READING: Chronic | ICD-10-CM

## 2025-05-08 DIAGNOSIS — R09.02 HYPOXIA: Chronic | ICD-10-CM

## 2025-05-08 DIAGNOSIS — F02.B11 MODERATE LATE ONSET ALZHEIMER'S DEMENTIA WITH AGITATION (HCC): Chronic | ICD-10-CM

## 2025-05-08 DIAGNOSIS — R19.7 DIARRHEA, UNSPECIFIED TYPE: ICD-10-CM

## 2025-05-08 DIAGNOSIS — R10.84 GENERALIZED ABDOMINAL PAIN: ICD-10-CM

## 2025-05-08 DIAGNOSIS — K52.9 COLITIS: ICD-10-CM

## 2025-05-08 PROBLEM — D72.829 LEUKOCYTOSIS: Status: ACTIVE | Noted: 2025-05-08

## 2025-05-08 LAB
ALBUMIN SERPL BCP-MCNC: 4.1 G/DL (ref 3.2–4.9)
ALBUMIN/GLOB SERPL: 1.6 G/DL
ALP SERPL-CCNC: 67 U/L (ref 30–99)
ALT SERPL-CCNC: 14 U/L (ref 2–50)
ANION GAP SERPL CALC-SCNC: 14 MMOL/L (ref 7–16)
APPEARANCE UR: CLEAR
AST SERPL-CCNC: 21 U/L (ref 12–45)
BACTERIA #/AREA URNS HPF: NORMAL /HPF
BASOPHILS # BLD AUTO: 0.5 % (ref 0–1.8)
BASOPHILS # BLD: 0.07 K/UL (ref 0–0.12)
BILIRUB SERPL-MCNC: 0.3 MG/DL (ref 0.1–1.5)
BILIRUB UR QL STRIP.AUTO: ABNORMAL
BUN SERPL-MCNC: 33 MG/DL (ref 8–22)
C DIFF TOX GENS STL QL NAA+PROBE: NEGATIVE
CALCIUM ALBUM COR SERPL-MCNC: 9.7 MG/DL (ref 8.5–10.5)
CALCIUM SERPL-MCNC: 9.8 MG/DL (ref 8.5–10.5)
CASTS URNS QL MICRO: NORMAL /LPF (ref 0–2)
CHLORIDE SERPL-SCNC: 104 MMOL/L (ref 96–112)
CO2 SERPL-SCNC: 20 MMOL/L (ref 20–33)
COLOR UR: ABNORMAL
CREAT SERPL-MCNC: 1.23 MG/DL (ref 0.5–1.4)
CRP SERPL HS-MCNC: <0.3 MG/DL (ref 0–0.75)
EKG IMPRESSION: NORMAL
EOSINOPHIL # BLD AUTO: 0.09 K/UL (ref 0–0.51)
EOSINOPHIL NFR BLD: 0.6 % (ref 0–6.9)
EPITHELIAL CELLS 1715: NORMAL /HPF (ref 0–5)
ERYTHROCYTE [DISTWIDTH] IN BLOOD BY AUTOMATED COUNT: 51.1 FL (ref 35.9–50)
ERYTHROCYTE [SEDIMENTATION RATE] IN BLOOD BY WESTERGREN METHOD: 2 MM/HOUR (ref 0–25)
FLUAV RNA SPEC QL NAA+PROBE: NEGATIVE
FLUBV RNA SPEC QL NAA+PROBE: NEGATIVE
GFR SERPLBLD CREATININE-BSD FMLA CKD-EPI: 43 ML/MIN/1.73 M 2
GLOBULIN SER CALC-MCNC: 2.6 G/DL (ref 1.9–3.5)
GLUCOSE SERPL-MCNC: 152 MG/DL (ref 65–99)
GLUCOSE UR STRIP.AUTO-MCNC: NEGATIVE MG/DL
HCT VFR BLD AUTO: 45.6 % (ref 37–47)
HGB BLD-MCNC: 15.2 G/DL (ref 12–16)
IMM GRANULOCYTES # BLD AUTO: 0.09 K/UL (ref 0–0.11)
IMM GRANULOCYTES NFR BLD AUTO: 0.6 % (ref 0–0.9)
KETONES UR STRIP.AUTO-MCNC: ABNORMAL MG/DL
LACTATE SERPL-SCNC: 1.5 MMOL/L (ref 0.5–2)
LEUKOCYTE ESTERASE UR QL STRIP.AUTO: ABNORMAL
LIPASE SERPL-CCNC: 89 U/L (ref 11–82)
LYMPHOCYTES # BLD AUTO: 1.2 K/UL (ref 1–4.8)
LYMPHOCYTES NFR BLD: 8.5 % (ref 22–41)
MCH RBC QN AUTO: 29.9 PG (ref 27–33)
MCHC RBC AUTO-ENTMCNC: 33.3 G/DL (ref 32.2–35.5)
MCV RBC AUTO: 89.8 FL (ref 81.4–97.8)
MICRO URNS: ABNORMAL
MONOCYTES # BLD AUTO: 0.87 K/UL (ref 0–0.85)
MONOCYTES NFR BLD AUTO: 6.2 % (ref 0–13.4)
NEUTROPHILS # BLD AUTO: 11.81 K/UL (ref 1.82–7.42)
NEUTROPHILS NFR BLD: 83.6 % (ref 44–72)
NITRITE UR QL STRIP.AUTO: NEGATIVE
NRBC # BLD AUTO: 0 K/UL
NRBC BLD-RTO: 0 /100 WBC (ref 0–0.2)
PH UR STRIP.AUTO: 5 [PH] (ref 5–8)
PLATELET # BLD AUTO: 285 K/UL (ref 164–446)
PMV BLD AUTO: 10.1 FL (ref 9–12.9)
POTASSIUM SERPL-SCNC: 4.5 MMOL/L (ref 3.6–5.5)
PROCALCITONIN SERPL-MCNC: 0.12 NG/ML
PROT SERPL-MCNC: 6.7 G/DL (ref 6–8.2)
PROT UR QL STRIP: NEGATIVE MG/DL
RBC # BLD AUTO: 5.08 M/UL (ref 4.2–5.4)
RBC # URNS HPF: NORMAL /HPF (ref 0–2)
RBC UR QL AUTO: NEGATIVE
RSV RNA SPEC QL NAA+PROBE: NEGATIVE
SARS-COV-2 RNA RESP QL NAA+PROBE: NOTDETECTED
SODIUM SERPL-SCNC: 138 MMOL/L (ref 135–145)
SP GR UR STRIP.AUTO: 1.02
SPECIMEN SOURCE: NORMAL
UROBILINOGEN UR STRIP.AUTO-MCNC: 1 EU/DL
WBC # BLD AUTO: 14.1 K/UL (ref 4.8–10.8)
WBC #/AREA URNS HPF: NORMAL /HPF

## 2025-05-08 PROCEDURE — 93005 ELECTROCARDIOGRAM TRACING: CPT | Mod: TC | Performed by: EMERGENCY MEDICINE

## 2025-05-08 PROCEDURE — 87493 C DIFF AMPLIFIED PROBE: CPT

## 2025-05-08 PROCEDURE — 74019 RADEX ABDOMEN 2 VIEWS: CPT

## 2025-05-08 PROCEDURE — 700105 HCHG RX REV CODE 258: Performed by: EMERGENCY MEDICINE

## 2025-05-08 PROCEDURE — 700102 HCHG RX REV CODE 250 W/ 637 OVERRIDE(OP): Performed by: STUDENT IN AN ORGANIZED HEALTH CARE EDUCATION/TRAINING PROGRAM

## 2025-05-08 PROCEDURE — 93005 ELECTROCARDIOGRAM TRACING: CPT | Mod: TC

## 2025-05-08 PROCEDURE — 99223 1ST HOSP IP/OBS HIGH 75: CPT | Mod: AI | Performed by: STUDENT IN AN ORGANIZED HEALTH CARE EDUCATION/TRAINING PROGRAM

## 2025-05-08 PROCEDURE — 87046 STOOL CULTR AEROBIC BACT EA: CPT

## 2025-05-08 PROCEDURE — 86140 C-REACTIVE PROTEIN: CPT

## 2025-05-08 PROCEDURE — 0241U HCHG SARS-COV-2 COVID-19 NFCT DS RESP RNA 4 TRGT MIC: CPT

## 2025-05-08 PROCEDURE — 700111 HCHG RX REV CODE 636 W/ 250 OVERRIDE (IP): Mod: JZ | Performed by: EMERGENCY MEDICINE

## 2025-05-08 PROCEDURE — 83605 ASSAY OF LACTIC ACID: CPT

## 2025-05-08 PROCEDURE — A9270 NON-COVERED ITEM OR SERVICE: HCPCS | Performed by: STUDENT IN AN ORGANIZED HEALTH CARE EDUCATION/TRAINING PROGRAM

## 2025-05-08 PROCEDURE — 87209 SMEAR COMPLEX STAIN: CPT

## 2025-05-08 PROCEDURE — 81001 URINALYSIS AUTO W/SCOPE: CPT

## 2025-05-08 PROCEDURE — 99285 EMERGENCY DEPT VISIT HI MDM: CPT

## 2025-05-08 PROCEDURE — 700111 HCHG RX REV CODE 636 W/ 250 OVERRIDE (IP): Mod: JZ

## 2025-05-08 PROCEDURE — 80053 COMPREHEN METABOLIC PANEL: CPT

## 2025-05-08 PROCEDURE — 74177 CT ABD & PELVIS W/CONTRAST: CPT

## 2025-05-08 PROCEDURE — 83690 ASSAY OF LIPASE: CPT

## 2025-05-08 PROCEDURE — 87899 AGENT NOS ASSAY W/OPTIC: CPT

## 2025-05-08 PROCEDURE — 85025 COMPLETE CBC W/AUTO DIFF WBC: CPT

## 2025-05-08 PROCEDURE — 770001 HCHG ROOM/CARE - MED/SURG/GYN PRIV*

## 2025-05-08 PROCEDURE — 700117 HCHG RX CONTRAST REV CODE 255: Performed by: EMERGENCY MEDICINE

## 2025-05-08 PROCEDURE — 85652 RBC SED RATE AUTOMATED: CPT

## 2025-05-08 PROCEDURE — 87045 FECES CULTURE AEROBIC BACT: CPT

## 2025-05-08 PROCEDURE — 87507 IADNA-DNA/RNA PROBE TQ 12-25: CPT

## 2025-05-08 PROCEDURE — 96365 THER/PROPH/DIAG IV INF INIT: CPT

## 2025-05-08 PROCEDURE — 700111 HCHG RX REV CODE 636 W/ 250 OVERRIDE (IP): Mod: JZ | Performed by: STUDENT IN AN ORGANIZED HEALTH CARE EDUCATION/TRAINING PROGRAM

## 2025-05-08 PROCEDURE — 84145 PROCALCITONIN (PCT): CPT

## 2025-05-08 PROCEDURE — 87177 OVA AND PARASITES SMEARS: CPT

## 2025-05-08 PROCEDURE — 700105 HCHG RX REV CODE 258: Performed by: STUDENT IN AN ORGANIZED HEALTH CARE EDUCATION/TRAINING PROGRAM

## 2025-05-08 PROCEDURE — 36415 COLL VENOUS BLD VENIPUNCTURE: CPT

## 2025-05-08 PROCEDURE — 96375 TX/PRO/DX INJ NEW DRUG ADDON: CPT

## 2025-05-08 RX ORDER — DESVENLAFAXINE 50 MG/1
50 TABLET, FILM COATED, EXTENDED RELEASE ORAL DAILY
Status: DISCONTINUED | OUTPATIENT
Start: 2025-05-08 | End: 2025-05-08

## 2025-05-08 RX ORDER — ENOXAPARIN SODIUM 100 MG/ML
40 INJECTION SUBCUTANEOUS DAILY
Status: DISCONTINUED | OUTPATIENT
Start: 2025-05-08 | End: 2025-05-11 | Stop reason: HOSPADM

## 2025-05-08 RX ORDER — SODIUM CHLORIDE 9 MG/ML
INJECTION, SOLUTION INTRAVENOUS CONTINUOUS
Status: ACTIVE | OUTPATIENT
Start: 2025-05-08 | End: 2025-05-09

## 2025-05-08 RX ORDER — METRONIDAZOLE 500 MG/100ML
500 INJECTION, SOLUTION INTRAVENOUS EVERY 6 HOURS
Status: COMPLETED | OUTPATIENT
Start: 2025-05-08 | End: 2025-05-08

## 2025-05-08 RX ORDER — LEVOTHYROXINE SODIUM 150 UG/1
150 TABLET ORAL
Status: DISCONTINUED | OUTPATIENT
Start: 2025-05-09 | End: 2025-05-11 | Stop reason: HOSPADM

## 2025-05-08 RX ORDER — ONDANSETRON 4 MG/1
4 TABLET, ORALLY DISINTEGRATING ORAL EVERY 4 HOURS PRN
Status: DISCONTINUED | OUTPATIENT
Start: 2025-05-08 | End: 2025-05-11 | Stop reason: HOSPADM

## 2025-05-08 RX ORDER — VENLAFAXINE 75 MG/1
37.5 TABLET ORAL 2 TIMES DAILY WITH MEALS
Status: DISCONTINUED | OUTPATIENT
Start: 2025-05-08 | End: 2025-05-11 | Stop reason: HOSPADM

## 2025-05-08 RX ORDER — HALOPERIDOL 5 MG/ML
2 INJECTION INTRAMUSCULAR ONCE
Status: COMPLETED | OUTPATIENT
Start: 2025-05-08 | End: 2025-05-08

## 2025-05-08 RX ORDER — ONDANSETRON 2 MG/ML
4 INJECTION INTRAMUSCULAR; INTRAVENOUS EVERY 4 HOURS PRN
Status: DISCONTINUED | OUTPATIENT
Start: 2025-05-08 | End: 2025-05-11 | Stop reason: HOSPADM

## 2025-05-08 RX ORDER — SODIUM CHLORIDE 9 MG/ML
500 INJECTION, SOLUTION INTRAVENOUS ONCE
Status: COMPLETED | OUTPATIENT
Start: 2025-05-08 | End: 2025-05-08

## 2025-05-08 RX ORDER — ACETAMINOPHEN 325 MG/1
650 TABLET ORAL EVERY 6 HOURS PRN
Status: DISCONTINUED | OUTPATIENT
Start: 2025-05-08 | End: 2025-05-11 | Stop reason: HOSPADM

## 2025-05-08 RX ORDER — METRONIDAZOLE 500 MG/100ML
500 INJECTION, SOLUTION INTRAVENOUS EVERY 12 HOURS
Status: DISCONTINUED | OUTPATIENT
Start: 2025-05-08 | End: 2025-05-10

## 2025-05-08 RX ORDER — QUETIAPINE FUMARATE 25 MG/1
25 TABLET, FILM COATED ORAL 2 TIMES DAILY
Status: DISCONTINUED | OUTPATIENT
Start: 2025-05-08 | End: 2025-05-11 | Stop reason: HOSPADM

## 2025-05-08 RX ORDER — CEFTRIAXONE 2 G/1
2000 INJECTION, POWDER, FOR SOLUTION INTRAMUSCULAR; INTRAVENOUS ONCE
Status: COMPLETED | OUTPATIENT
Start: 2025-05-08 | End: 2025-05-08

## 2025-05-08 RX ORDER — ONDANSETRON 2 MG/ML
4 INJECTION INTRAMUSCULAR; INTRAVENOUS ONCE
Status: COMPLETED | OUTPATIENT
Start: 2025-05-08 | End: 2025-05-08

## 2025-05-08 RX ORDER — ACETAMINOPHEN 500 MG
500-1000 TABLET ORAL EVERY 6 HOURS PRN
COMMUNITY

## 2025-05-08 RX ORDER — MORPHINE SULFATE 4 MG/ML
4 INJECTION INTRAVENOUS ONCE
Status: COMPLETED | OUTPATIENT
Start: 2025-05-08 | End: 2025-05-08

## 2025-05-08 RX ADMIN — QUETIAPINE FUMARATE 25 MG: 25 TABLET ORAL at 17:12

## 2025-05-08 RX ADMIN — ONDANSETRON 4 MG: 2 INJECTION INTRAMUSCULAR; INTRAVENOUS at 02:58

## 2025-05-08 RX ADMIN — METRONIDAZOLE 500 MG: 500 INJECTION, SOLUTION INTRAVENOUS at 20:21

## 2025-05-08 RX ADMIN — IOHEXOL 100 ML: 350 INJECTION, SOLUTION INTRAVENOUS at 09:30

## 2025-05-08 RX ADMIN — SODIUM CHLORIDE 500 ML: 9 INJECTION, SOLUTION INTRAVENOUS at 05:16

## 2025-05-08 RX ADMIN — MORPHINE SULFATE 4 MG: 4 INJECTION, SOLUTION INTRAMUSCULAR; INTRAVENOUS at 10:09

## 2025-05-08 RX ADMIN — CEFTRIAXONE SODIUM 2000 MG: 2 INJECTION, POWDER, FOR SOLUTION INTRAMUSCULAR; INTRAVENOUS at 10:10

## 2025-05-08 RX ADMIN — ENOXAPARIN SODIUM 40 MG: 100 INJECTION SUBCUTANEOUS at 17:12

## 2025-05-08 RX ADMIN — METRONIDAZOLE 500 MG: 500 INJECTION, SOLUTION INTRAVENOUS at 12:29

## 2025-05-08 RX ADMIN — SODIUM CHLORIDE: 9 INJECTION, SOLUTION INTRAVENOUS at 12:27

## 2025-05-08 RX ADMIN — HALOPERIDOL LACTATE 2 MG: 5 INJECTION, SOLUTION INTRAMUSCULAR at 23:07

## 2025-05-08 RX ADMIN — VENLAFAXINE HYDROCHLORIDE 37.5 MG: 75 TABLET ORAL at 17:12

## 2025-05-08 ASSESSMENT — PAIN DESCRIPTION - PAIN TYPE
TYPE: ACUTE PAIN

## 2025-05-08 ASSESSMENT — ENCOUNTER SYMPTOMS
VOMITING: 1
ABDOMINAL PAIN: 1
NAUSEA: 1
FEVER: 0
CHILLS: 0
COUGH: 0
DIARRHEA: 1
DIZZINESS: 0
WEAKNESS: 1

## 2025-05-08 ASSESSMENT — FIBROSIS 4 INDEX: FIB4 SCORE: 1.1

## 2025-05-08 ASSESSMENT — PATIENT HEALTH QUESTIONNAIRE - PHQ9
2. FEELING DOWN, DEPRESSED, IRRITABLE, OR HOPELESS: NOT AT ALL
SUM OF ALL RESPONSES TO PHQ9 QUESTIONS 1 AND 2: 0
1. LITTLE INTEREST OR PLEASURE IN DOING THINGS: NOT AT ALL

## 2025-05-08 ASSESSMENT — LIFESTYLE VARIABLES
TOTAL SCORE: 0
EVER HAD A DRINK FIRST THING IN THE MORNING TO STEADY YOUR NERVES TO GET RID OF A HANGOVER: NO
HAVE YOU EVER FELT YOU SHOULD CUT DOWN ON YOUR DRINKING: NO
TOTAL SCORE: 0
TOTAL SCORE: 0
AVERAGE NUMBER OF DAYS PER WEEK YOU HAVE A DRINK CONTAINING ALCOHOL: 0
ON A TYPICAL DAY WHEN YOU DRINK ALCOHOL HOW MANY DRINKS DO YOU HAVE: 0
DOES PATIENT WANT TO STOP DRINKING: NO
HAVE PEOPLE ANNOYED YOU BY CRITICIZING YOUR DRINKING: NO
CONSUMPTION TOTAL: NEGATIVE
EVER FELT BAD OR GUILTY ABOUT YOUR DRINKING: NO
ALCOHOL_USE: NO
HOW MANY TIMES IN THE PAST YEAR HAVE YOU HAD 5 OR MORE DRINKS IN A DAY: 0

## 2025-05-08 NOTE — ED NOTES
Bedside report rec'd from FUNMILAYO Franklin.  Pt resting in [facility] on her side.    Stool on floor and in commode.  Room cleaned and linens changed.    Call light in reach.  Warm blankets applied.  Awaiting CT.

## 2025-05-08 NOTE — PROGRESS NOTES
Patient admitted to room 619. Special contact precautions initiated. Bed alarm on. Fall precautions in place. Patient incontinent x1 of fstool but unable to obtain sample due to absorbence on jonathan pad.

## 2025-05-08 NOTE — ED NOTES
Patient requesting to go to the bathroom to urinate    Patient urinated 3x, but still want to go to the bathroom    Commode placed at bedside-patient refused    Agree for puriwick

## 2025-05-08 NOTE — ED NOTES
Pt incont of large amount of liquid brown stool.  Pt assisted on to BSC and having more stool.  Linens and pt cleaned.  Call light in reach.

## 2025-05-08 NOTE — ED NOTES
Spoke with lucy on the phone, she reports pt went lunch in [place] with friends and after was not able to eat dinner. Pt woke her up at 0100 this am with lower abd stomach pain. Lucy reports emesis at home PTA.

## 2025-05-08 NOTE — PROGRESS NOTES
4 Eyes Skin Assessment Completed by FUNMILAYO Jackman and FUNMILAYO [place].    Head WDL  Ears WDL  Nose WDL  Mouth WDL  Neck WDL  Breast/Chest WDL  Shoulder Blades WDL  Spine WDL  (R) Arm/Elbow/Hand Scab  (L) Arm/Elbow/Hand WDL  Abdomen - growth left lower quad  Groin WDL  Scrotum/Coccyx/Buttocks Redness, Blanching, and Discoloration- line across sacrum- nonblanching   (R) Leg WDL  (L) Leg Scab  (R) Heel/Foot/Toe Redness and Blanching- dry, calloused   (L) Heel/Foot/Toe Redness and Blanching- dry, calloused           Devices In Places - PIV, nasal cannula       Interventions In Place Pillows    Possible Skin Injury No    Pictures Uploaded Into Epic Yes  Wound Consult Placed N/A  RN Wound Prevention Protocol Ordered Yes

## 2025-05-08 NOTE — ED PROVIDER NOTES
ER Provider Note    Scribed for Henry Ga II, M.D. by Micheal Hickey. 5/8/2025  2:39 AM    Primary Care Provider: GEORGES Brewster    CHIEF COMPLAINT   Chief Complaint   Patient presents with    Abdominal Pain     Pt reports sudden onset of abdominal pain this morning, pt denies previous sickness, fevers, N/V/D.      EXTERNAL RECORDS REVIEWED  Office visit approximately 2 weeks ago.  Discusses Alzheimer's dementia and depression.    HPI/ROS  LIMITATION TO HISTORY   Select: Dementia   OUTSIDE HISTORIAN(S):  None     Bernie Barrientos is a 84 y.o. female with a history of dementia who presents to the ED via EMS for evaluation of sudden onset lower abdominal pain this morning. Per triage note the patient denied diarrhea or fevers. She is nauseous and dry heaving upon evaluation.     PAST MEDICAL HISTORY  Past Medical History:   Diagnosis Date    Alzheimer's dementia with agitation (HCC)     Anesthesia     PONV    Arthritis 2003    bilat hip and neck surg related to arthritis, bilateral thumbs surgery do to arthritis    Colon polyp     Colon polyps     Dental disorder     upper partial dentures    DJD (degenerative joint disease)     Heart valve disease 2005    mitral valve prolapse    Hypothyroidism     Migraine     Postmenopausal hormone replacement therapy     Sleep apnea      SURGICAL HISTORY  Past Surgical History:   Procedure Laterality Date    COLONOSCOPY WITH POLYP  8/15/2013    Performed by Javier Gould M.D. at SURGERY [facility]    HAND SURGERY  2009    right index finger joint reconstruction    HAND SURGERY  2004    bilateral thumb reconstruction    ABDOMINAL HYSTERECTOMY TOTAL  1984    benign    APPENDECTOMY  1957    CERVICAL FUSION POSTERIOR      C4,5,6,7 neck fusion    HIP ARTHROPLASTY TOTAL Bilateral     TONSILLECTOMY  as child     FAMILY HISTORY  Family History   Problem Relation Age of Onset    Colorectal Cancer Mother     Diabetes Mother     No Known Problems Father         Passed away  "before the pt was born     Thyroid Sister     Diabetes Other     Heart Disease Neg Hx     Hypertension Neg Hx     Hyperlipidemia Neg Hx     Breast Cancer Neg Hx     Peritoneal Cancer Neg Hx     Ovarian Cancer Neg Hx     Tubal Cancer Neg Hx      SOCIAL HISTORY   reports that she quit smoking about 45 years ago. Her smoking use included cigarettes. She started smoking about 53 years ago. She has a 8 pack-year smoking history. She has never used smokeless tobacco. She reports current alcohol use of about 1.2 oz of alcohol per week. She reports that she does not use drugs.    CURRENT MEDICATIONS  Previous Medications    ACETAMINOPHEN (TYLENOL PO)    Take  by mouth.    DESVENLAFAXINE SUCCINATE (PRISTIQ) 50 MG TABLET SR 24 HR    Take 1 Tablet by mouth every day. FOR DEPRESSION    DICLOFENAC SODIUM (VOLTAREN) 1 % GEL    Apply 4 g topically 4 times a day as needed (ARTHRITIS PAIN/KNEE PAIN).    LEVOTHYROXINE (SYNTHROID) 150 MCG TAB    Take 1 Tablet by mouth every morning on an empty stomach.    QUETIAPINE (SEROQUEL) 25 MG TAB    Take 1 Tablet by mouth 2 times a day. FOR AGITATION     ALLERGIES  Naprosyn [naproxen], Pcn [penicillins], and Ibuprofen    PHYSICAL EXAM  BP (!) 157/65   Pulse 62   Temp 36.3 °C (97.4 °F) (Temporal)   Resp 16   Ht 1.727 m (5' 8\")   Wt 68 kg (150 lb)   SpO2 93%   BMI 22.81 kg/m²   Physical Exam  Vitals and nursing note reviewed.   Constitutional:       Appearance: Normal appearance.   HENT:      Head: Normocephalic and atraumatic.   Cardiovascular:      Rate and Rhythm: Normal rate and regular rhythm.   Pulmonary:      Effort: Pulmonary effort is normal.      Breath sounds: Normal breath sounds.   Abdominal:      Comments: Distended, tender to palpation   Neurological:      Mental Status: She is alert.      Comments: Awake, clear speech, oriented to person but not place, time, or situation. Moving all extremities.      DIAGNOSTIC STUDIES    EKG/LABS  Results for orders placed or performed " during the hospital encounter of 25   EKG    Collection Time: 25  2:28 AM   Result Value Ref Range    Report       [facility] Emergency Dept.    Test Date:  2025  Pt Name:    SHARRON RUIZ                 Department: ER  MRN:        [id number]                      Room:       RD 12  Gender:     Female                       Technician: [id number]  :        [id number]                   Requested By:ER TRIAGE PROTOCOL  Order #:    [id number]                    Reading MD:    Measurements  Intervals                                Axis  Rate:       59                           P:          -72  IA:         136                          QRS:        244  QRSD:       105                          T:          134  QT:         438  QTc:        434    Interpretive Statements  Pacemaker spikes or artifacts  Sinus or ectopic atrial bradycardia  Anterolateral infarct, age indeterminate  Compared to ECG 2017 19:09:21  Bradycardia, nonsinus now present  Myocardial infarct finding now present  Sinus bradycardia no longer present  Left anterior fascicular block no longer present     CBC with Differential    Collection Time: 25  2:41 AM   Result Value Ref Range    WBC 14.1 (H) 4.8 - 10.8 K/uL    RBC 5.08 4.20 - 5.40 M/uL    Hemoglobin 15.2 12.0 - 16.0 g/dL    Hematocrit 45.6 37.0 - 47.0 %    MCV 89.8 81.4 - 97.8 fL    MCH 29.9 27.0 - 33.0 pg    MCHC 33.3 32.2 - 35.5 g/dL    RDW 51.1 (H) 35.9 - 50.0 fL    Platelet Count 285 164 - 446 K/uL    MPV 10.1 9.0 - 12.9 fL    Neutrophils-Polys 83.60 (H) 44.00 - 72.00 %    Lymphocytes 8.50 (L) 22.00 - 41.00 %    Monocytes 6.20 0.00 - 13.40 %    Eosinophils 0.60 0.00 - 6.90 %    Basophils 0.50 0.00 - 1.80 %    Immature Granulocytes 0.60 0.00 - 0.90 %    Nucleated RBC 0.00 0.00 - 0.20 /100 WBC    Neutrophils (Absolute) 11.81 (H) 1.82 - 7.42 K/uL    Lymphs (Absolute) 1.20 1.00 - 4.80 K/uL    Monos (Absolute) 0.87 (H) 0.00 - 0.85 K/uL    Eos (Absolute) 0.09  0.00 - 0.51 K/uL    Baso (Absolute) 0.07 0.00 - 0.12 K/uL    Immature Granulocytes (abs) 0.09 0.00 - 0.11 K/uL    NRBC (Absolute) 0.00 K/uL   Complete Metabolic Panel    Collection Time: 05/08/25  2:41 AM   Result Value Ref Range    Sodium 138 135 - 145 mmol/L    Potassium 4.5 3.6 - 5.5 mmol/L    Chloride 104 96 - 112 mmol/L    Co2 20 20 - 33 mmol/L    Anion Gap 14.0 7.0 - 16.0    Glucose 152 (H) 65 - 99 mg/dL    Bun 33 (H) 8 - 22 mg/dL    Creatinine 1.23 0.50 - 1.40 mg/dL    Calcium 9.8 8.5 - 10.5 mg/dL    Correct Calcium 9.7 8.5 - 10.5 mg/dL    AST(SGOT) 21 12 - 45 U/L    ALT(SGPT) 14 2 - 50 U/L    Alkaline Phosphatase 67 30 - 99 U/L    Total Bilirubin 0.3 0.1 - 1.5 mg/dL    Albumin 4.1 3.2 - 4.9 g/dL    Total Protein 6.7 6.0 - 8.2 g/dL    Globulin 2.6 1.9 - 3.5 g/dL    A-G Ratio 1.6 g/dL   Lipase    Collection Time: 05/08/25  2:41 AM   Result Value Ref Range    Lipase 89 (H) 11 - 82 U/L   ESTIMATED GFR    Collection Time: 05/08/25  2:41 AM   Result Value Ref Range    GFR (CKD-EPI) 43 (A) >60 mL/min/1.73 m 2   LACTIC ACID    Collection Time: 05/08/25  3:05 AM   Result Value Ref Range    Lactic Acid 1.5 0.5 - 2.0 mmol/L   Urinalysis    Collection Time: 05/08/25  3:56 AM    Specimen: Urine   Result Value Ref Range    Color Dark Yellow     Character Clear     Specific Gravity 1.019 <1.035    Ph 5.0 5.0 - 8.0    Glucose Negative Negative mg/dL    Ketones Trace (A) Negative mg/dL    Protein Negative Negative mg/dL    Bilirubin Moderate (A) Negative    Urobilinogen, Urine 1.0 <=1.0 EU/dL    Nitrite Negative Negative    Leukocyte Esterase Small (A) Negative    Occult Blood Negative Negative    Micro Urine Req Microscopic    URINE MICROSCOPIC (W/UA)    Collection Time: 05/08/25  3:56 AM   Result Value Ref Range    WBC 0-2 /hpf    RBC 0-2 0 - 2 /hpf    Bacteria None Seen None /hpf    Epithelial Cells 0-2 0 - 5 /hpf    Urine Casts 0-2 0 - 2 /lpf      I have independently interpreted this EKG     RADIOLOGY/PROCEDURES   The  attending emergency physician has independently interpreted the diagnostic imaging associated with this visit and am waiting the final reading from the radiologist.   My preliminary interpretation is a follows: No free air, no air-fluid levels to suggest obstruction.    Radiologist interpretation:  [id number] VIEWS   Final Result         1.  Moderate quantity of stool in the colon favors changes of constipation, otherwise nonspecific bowel gas pattern.   2.  Left basilar atelectasis.      CT-ABDOMEN-PELVIS WITH    (Results Pending)       COURSE & MEDICAL DECISION MAKING     ASSESSMENT, COURSE AND PLAN  Care Narrative:     2:39 AM - This is an emergency department evaluation of a 84 y.o. female who presents with abdominal pain. Abdomen with distension and tenderness. Possible obstruction, ileus, infection. Plan for labs cbc, cmp, lipase, lactic acid, UA. Will order XR abdomen (to look for clear obstruction or perforation) and if labs ok will likely need CT as well.      ADMITTED TO ED OBSERVATION AT 7:16 AM FOR diagnostic uncertainty, therapeutic intensity.  May need prolonged stay in the ER for treatment of constipation, additional advanced imaging.      4:46 AM - Reviewed returned labs.  White count 14.  Ordered CT abdomen pelvis with to further evaluate because of leukocytosis, significantly tender.  She will also be administered NS 0.5L IV for hydration.  Metabolic panel has a increased BUN.    7:17 AM  Still awaiting CT scan.  She has got up to go to the bathroom several times but has not produced anything.  I signed out to my colleague, Dr. Lackey.  Patient will remain in ED observation.  Anticipate      PROBLEM LIST  # Abdominal pain   - Suspect due to constipation but could be another underlying problem such as colitis    DISPOSITION AND DISCUSSIONS  I have discussed management of the patient with the following physicians and DAYA's:  Ziyad (ERP)    FINAL DIAGNOSIS  1. Generalized abdominal pain    2.  Dementia, unspecified dementia severity, unspecified dementia type, unspecified whether behavioral, psychotic, or mood disturbance or anxiety (HCC)         IMicheal (Scribe), am scribing for, and in the presence of, EDVIN Fajardo II.    Electronically signed by: Micheal Hickey (Scribe), 5/8/2025    Henry LING II, M* personally performed the services described in this documentation, as scribed by Micheal Hickey in my presence, and it is both accurate and complete.     The note accurately reflects work and decisions made by me.  Henry Ga II, M.D.  5/8/2025  7:18 AM

## 2025-05-08 NOTE — ASSESSMENT & PLAN NOTE
Nausea, vomiting, nonbloody diarrhea, and abdominal pain.  Has been going on for 3 days  CT abdomen in ED showed Findings consistent with infectious/inflammatory colitis   Received IV flagyl and ceftriaxone   Check ESR CRP  Stool culture and ova and parasite  GI panel  Check COVID panel  Check C. difficile  Continue ceftriaxone and Flagyl  Follow-up on cultures   not applicable (Male)

## 2025-05-08 NOTE — ED NOTES
Pt cleaned of small amount of stool.  Lab reports that more stool needed for testing, unable to get any stool at this time d/t small amount.

## 2025-05-08 NOTE — DISCHARGE PLANNING
"TCN following. HTH/SCP chart review completed. Note pt currently in ED secondary to abdominal pain.      Per chart review unclear if patient resides with stepdaughter, and per recent PCP note, \"Yoselyn has concerns about agitation at home and progressively worsening dementia.\"  Per daughter communication with neurology on 2/28, \"Caregivers don't want to come because of her meanness. She won't allow them to do there Job and she's scarring them. Me included.\" Unclear if behavior is still an issue.     Noted per RN note, \"Patient insisted to ambulate to the bathroom, patient attempted but unable to walk further\" and is currently utilizing 2L supplemental O2; patient's baseline appears to be RA. At this time anticipating that pt will dc to HH vs. SNF  (either directly from ED or after admission to [facility] if warranted). Per chart patient has Non[facility] PCP.     If pt functionally capable of dc to home, given current chart review, pt may benefit from HH services at time of dc from ED.      If pt does NOT warrant admission to [facility] and is functionally unable to dc to home, please reach out to TCN for assistance with SCP auth for dc directly to SNF from ED if needed.  TCN reached out to SW.      If pt admits to [facility], TCN will not follow.     *Update*  Patient is now inpatient.   "

## 2025-05-08 NOTE — ED NOTES
Med Rec completed per patient's home pharmacy ([facility])   Allergies reviewed    Dispense history available in EPIC? Yes    Patient is not taking anticoagulants

## 2025-05-08 NOTE — ED NOTES
Transport at bedside for transfer of pt to [facility], pt cleaned up of incont stool prior to transfer.  Unable to collect any stool for testing.  Belongings with pt.

## 2025-05-08 NOTE — ED NOTES
Pt about to go to CT,  pt had another incont large liquid brown stool  Pt cleaned up again and CT called back to take pt to CT.

## 2025-05-08 NOTE — ED NOTES
Bedside report received from off going RN Mignon, assumed care of patient.  POC discussed with patient. Call light within reach, all needs addressed at this time.       Fall risk interventions in place: Patient's personal possessions are with in their safe reach, Place fall risk sign on patient's door, and Keep floor surfaces clean and dry (all applicable per [facility] Fall risk assessment)   Continuous monitoring: Cardiac Leads, Pulse Ox, or Blood Pressure  IVF/IV medications: Not Applicable   Oxygen: How many liters 2L  Bedside sitter: Not Applicable   Isolation: Not Applicable

## 2025-05-08 NOTE — ED NOTES
Pt medicated per MAR. Blood collected and sent to lab. Call light is within reach. Bed is locked and in the lowest position.

## 2025-05-08 NOTE — ED TRIAGE NOTES
Chief Complaint   Patient presents with    Abdominal Pain     Pt reports sudden onset of abdominal pain this morning, pt denies previous sickness, fevers, N/V/D.      Pt BIB EMS from home, pt reports sudden onset of lower abdominal pain. Pt originally denies N/V/D and fevers. Pt nauseous with emesis during triage. Pt denies painful urination. Pt A&Ox2-3 at baseline with history of dementia. Pt given PIV and 4mg morphine IV by EMS PTA. Pt has history of dementia.

## 2025-05-08 NOTE — H&P
Hospital Medicine History & Physical Note    Date of Service  5/8/2025    Primary Care Physician  GEORGES Brewster    Consultants  None       Code Status  Full Code    Chief Complaint  Chief Complaint   Patient presents with    Abdominal Pain     Pt reports sudden onset of abdominal pain this morning, pt denies previous sickness, fevers, N/V/D.        History of Presenting Illness  Bernie Barrientos is a 84 y.o. female who presented 5/8/2025 with past medical history of Alzheimer, hypothyroidism, who presented to the emergency department complaining of nausea vomiting and abdominal pain and nonbloody diarrhea.  Patient mentioned that this has been going on for almost 3 days and progressively worsening.  She denies any fever or chills.  She denies dysuria, urinary frequency or urgency. In emergency department patient was found to have a leukocytosis with CT abdomen and pelvis showed Findings consistent with infectious/inflammatory colitis.  In ED patient had 6-8 non-bloody diarrhea.  Patient received ceftriaxone and Flagyl.  I was requested to admit patient to the hospital for further management.    I discussed the plan of care with patient.    Review of Systems  Review of Systems   Constitutional:  Positive for malaise/fatigue. Negative for chills and fever.   Respiratory:  Negative for cough.    Cardiovascular:  Negative for chest pain and leg swelling.   Gastrointestinal:  Positive for abdominal pain, diarrhea, nausea and vomiting.   Genitourinary:  Negative for dysuria, frequency and urgency.   Neurological:  Positive for weakness. Negative for dizziness.       Past Medical History   has a past medical history of Alzheimer's dementia with agitation (HCC), Anesthesia, Arthritis (2003), Colon polyp, Colon polyps, Dental disorder, DJD (degenerative joint disease), Heart valve disease (2005), Hypothyroidism, Migraine, Postmenopausal hormone replacement therapy, and Sleep apnea.    Surgical History   has a past  surgical history that includes tonsillectomy (as child); appendectomy (1957); hand surgery (2004); hand surgery (2009); colonoscopy with polyp (8/15/2013); abdominal hysterectomy total (1984); hip arthroplasty total (Bilateral); and cervical fusion posterior.     Family History  family history includes Colorectal Cancer in her mother; Diabetes in her mother and another family member; No Known Problems in her father; Thyroid in her sister.   Family history reviewed with patient. There is no family history that is pertinent to the chief complaint.     Social History   reports that she quit smoking about 45 years ago. Her smoking use included cigarettes. She started smoking about 53 years ago. She has a 8 pack-year smoking history. She has never used smokeless tobacco. She reports current alcohol use of about 1.2 oz of alcohol per week. She reports that she does not use drugs.    Allergies  Allergies   Allergen Reactions    Naprosyn [Naproxen] Rash     Rash on body    Pcn [Penicillins]      Reaction when she was a child        Medications  Prior to Admission Medications   Prescriptions Last Dose Informant Patient Reported? Taking?   QUEtiapine (SEROQUEL) 25 MG Tab Unknown Patient No No   Sig: Take 1 Tablet by mouth 2 times a day. FOR AGITATION   acetaminophen (TYLENOL) 500 MG Tab Unknown Patient Yes No   Sig: Take 500-1,000 mg by mouth every 6 hours as needed. Indications: Pain   desvenlafaxine succinate (PRISTIQ) 50 MG TABLET SR 24 HR Unknown Patient No No   Sig: Take 1 Tablet by mouth every day. FOR DEPRESSION   diclofenac sodium (VOLTAREN) 1 % Gel Unknown Patient No No   Sig: Apply 4 g topically 4 times a day as needed (ARTHRITIS PAIN/KNEE PAIN).   levothyroxine (SYNTHROID) 150 MCG Tab Unknown Patient No No   Sig: Take 1 Tablet by mouth every morning on an empty stomach.      Facility-Administered Medications: None       Physical Exam  Temp:  [36.3 °C (97.4 °F)] 36.3 °C (97.4 °F)  Pulse:  [60-71] 71  Resp:  [16-21]  "16  BP: (109-178)/(50-79) 150/67  SpO2:  [91 %-97 %] 97 %  Blood Pressure : (!) 150/67   Temperature: 36.3 °C (97.4 °F)   Pulse: 71   Respiration: 16   Pulse Oximetry: 97 %       Physical Exam  Constitutional:       Appearance: She is not ill-appearing.   HENT:      Head: Normocephalic.      Nose: Nose normal.      Mouth/Throat:      Mouth: Mucous membranes are moist.   Cardiovascular:      Rate and Rhythm: Normal rate and regular rhythm.   Pulmonary:      Effort: Pulmonary effort is normal.      Breath sounds: Normal breath sounds.   Abdominal:      General: Abdomen is flat.      Tenderness: There is abdominal tenderness.   Musculoskeletal:         General: Normal range of motion.   Skin:     General: Skin is warm.   Neurological:      General: No focal deficit present.      Mental Status: She is alert and oriented to person, place, and time.         Laboratory:  Recent Labs     05/08/25  0241   WBC 14.1*   RBC 5.08   HEMOGLOBIN 15.2   HEMATOCRIT 45.6   MCV 89.8   MCH 29.9   MCHC 33.3   RDW 51.1*   PLATELETCT 285   MPV 10.1     Recent Labs     05/08/25  0241   SODIUM 138   POTASSIUM 4.5   CHLORIDE 104   CO2 20   GLUCOSE 152*   BUN 33*   CREATININE 1.23   CALCIUM 9.8     Recent Labs     05/08/25  0241   ALTSGPT 14   ASTSGOT 21   ALKPHOSPHAT 67   TBILIRUBIN 0.3   LIPASE 89*   GLUCOSE 152*         No results for input(s): \"NTPROBNP\" in the last 72 hours.      No results for input(s): \"TROPONINT\" in the last 72 hours.    Imaging:  CT-ABDOMEN-PELVIS WITH   Final Result      1.  Findings consistent with infectious/inflammatory colitis.   2.  Cholelithiasis.         [id number] VIEWS   Final Result         1.  Moderate quantity of stool in the colon favors changes of constipation, otherwise nonspecific bowel gas pattern.   2.  Left basilar atelectasis.              Assessment/Plan:  Justification for Admission Status  I anticipate this patient will require at least two midnights for appropriate medical management, " necessitating inpatient admission because of diarrhea and colitis requiring IV antibiotics and treatment and further workup.     Patient will need a Med/Surg bed on EMERGENCY service .  The need is secondary to above.    * Colitis  Assessment & Plan  Nausea, vomiting, nonbloody diarrhea, and abdominal pain.  Has been going on for 3 days  CT abdomen in ED showed Findings consistent with infectious/inflammatory colitis   Received IV flagyl and ceftriaxone   Check ESR CRP  Stool culture and ova and parasite  GI panel  Check COVID panel  Check C. difficile  Continue ceftriaxone and Flagyl  Follow-up on cultures    Leukocytosis  Assessment & Plan  Due to colitis   Repeat CBC in am     Diarrhea  Assessment & Plan  Due to colitis   Follow up on cultures     Acquired hypothyroidism- (present on admission)  Assessment & Plan  Continue levothyroxine        VTE prophylaxis: enoxaparin ppx

## 2025-05-08 NOTE — DISCHARGE SUMMARY
"  ED Observation Discharge Summary    Patient:Bernie Barrientos  Patient : 1940  Patient MRN: [id number]  Patient PCP: GEORGES Brewster    Admit Date: 2025  Discharge Date and Time: 25 9:51 AM  Discharge Diagnosis:   1. Generalized abdominal pain    2. Dementia, unspecified dementia severity, unspecified dementia type, unspecified whether behavioral, psychotic, or mood disturbance or anxiety ([facility])        Discharge Attending: Kain Lackey M.D.  Discharge Service: ED Observation    ED Course  Bernie is a 84 y.o. female who was evaluated at [facility] emergency department with abdominal pain.  Care was taken over pending CT scan.  Workup to that point showed abdominal pain with an elevated white blood cell count.  CT scan has now returned.  Findings consistent with colitis    CT-ABDOMEN-PELVIS WITH   Final Result      1.  Findings consistent with infectious/inflammatory colitis.   2.  Cholelithiasis.         [id number] VIEWS   Final Result         1.  Moderate quantity of stool in the colon favors changes of constipation, otherwise nonspecific bowel gas pattern.   2.  Left basilar atelectasis.          Patient continues to have abdominal pain.  She has had numerous bowel movements in the emergency department.  At least 6 watery bowel movements.  Ordered C. difficile for evaluation.  Patient will be treated with ceftriaxone and Flagyl.  Given her advanced age and evaluation consistent with colitis she will be admitted to the hospital for further evaluation and treatment.  I spoke with Dr. Kapoor.  Who will evaluate the patient for hospitalization.      Discharge Exam:  BP (!) 150/67   Pulse 71   Temp 36.3 °C (97.4 °F) (Temporal)   Resp 16   Ht 1.727 m (5' 8\")   Wt 68 kg (150 lb)   SpO2 97%   BMI 22.81 kg/m² .    Constitutional: Awake and alert. Nontoxic  HENT:  Grossly normal  Eyes: Grossly normal  Neck: Normal range of motion  Cardiovascular: Normal heart rate   Thorax & Lungs: No " respiratory distress  Abdomen: Nontender  Skin:  No pathologic rash.   Extremities: Well perfused  Psychiatric: Affect normal    Labs  Results for orders placed or performed during the hospital encounter of 05/08/25   CBC with Differential    Collection Time: 05/08/25  2:41 AM   Result Value Ref Range    WBC 14.1 (H) 4.8 - 10.8 K/uL    RBC 5.08 4.20 - 5.40 M/uL    Hemoglobin 15.2 12.0 - 16.0 g/dL    Hematocrit 45.6 37.0 - 47.0 %    MCV 89.8 81.4 - 97.8 fL    MCH 29.9 27.0 - 33.0 pg    MCHC 33.3 32.2 - 35.5 g/dL    RDW 51.1 (H) 35.9 - 50.0 fL    Platelet Count 285 164 - 446 K/uL    MPV 10.1 9.0 - 12.9 fL    Neutrophils-Polys 83.60 (H) 44.00 - 72.00 %    Lymphocytes 8.50 (L) 22.00 - 41.00 %    Monocytes 6.20 0.00 - 13.40 %    Eosinophils 0.60 0.00 - 6.90 %    Basophils 0.50 0.00 - 1.80 %    Immature Granulocytes 0.60 0.00 - 0.90 %    Nucleated RBC 0.00 0.00 - 0.20 /100 WBC    Neutrophils (Absolute) 11.81 (H) 1.82 - 7.42 K/uL    Lymphs (Absolute) 1.20 1.00 - 4.80 K/uL    Monos (Absolute) 0.87 (H) 0.00 - 0.85 K/uL    Eos (Absolute) 0.09 0.00 - 0.51 K/uL    Baso (Absolute) 0.07 0.00 - 0.12 K/uL    Immature Granulocytes (abs) 0.09 0.00 - 0.11 K/uL    NRBC (Absolute) 0.00 K/uL   Complete Metabolic Panel    Collection Time: 05/08/25  2:41 AM   Result Value Ref Range    Sodium 138 135 - 145 mmol/L    Potassium 4.5 3.6 - 5.5 mmol/L    Chloride 104 96 - 112 mmol/L    Co2 20 20 - 33 mmol/L    Anion Gap 14.0 7.0 - 16.0    Glucose 152 (H) 65 - 99 mg/dL    Bun 33 (H) 8 - 22 mg/dL    Creatinine 1.23 0.50 - 1.40 mg/dL    Calcium 9.8 8.5 - 10.5 mg/dL    Correct Calcium 9.7 8.5 - 10.5 mg/dL    AST(SGOT) 21 12 - 45 U/L    ALT(SGPT) 14 2 - 50 U/L    Alkaline Phosphatase 67 30 - 99 U/L    Total Bilirubin 0.3 0.1 - 1.5 mg/dL    Albumin 4.1 3.2 - 4.9 g/dL    Total Protein 6.7 6.0 - 8.2 g/dL    Globulin 2.6 1.9 - 3.5 g/dL    A-G Ratio 1.6 g/dL   Lipase    Collection Time: 05/08/25  2:41 AM   Result Value Ref Range    Lipase 89 (H) 11 - 82  U/L   ESTIMATED GFR    Collection Time: 25  2:41 AM   Result Value Ref Range    GFR (CKD-EPI) 43 (A) >60 mL/min/1.73 m 2   LACTIC ACID    Collection Time: 25  3:05 AM   Result Value Ref Range    Lactic Acid 1.5 0.5 - 2.0 mmol/L   Urinalysis    Collection Time: 25  3:56 AM    Specimen: Urine   Result Value Ref Range    Color Dark Yellow     Character Clear     Specific Gravity 1.019 <1.035    Ph 5.0 5.0 - 8.0    Glucose Negative Negative mg/dL    Ketones Trace (A) Negative mg/dL    Protein Negative Negative mg/dL    Bilirubin Moderate (A) Negative    Urobilinogen, Urine 1.0 <=1.0 EU/dL    Nitrite Negative Negative    Leukocyte Esterase Small (A) Negative    Occult Blood Negative Negative    Micro Urine Req Microscopic    URINE MICROSCOPIC (W/UA)    Collection Time: 25  3:56 AM   Result Value Ref Range    WBC 0-2 /hpf    RBC 0-2 0 - 2 /hpf    Bacteria None Seen None /hpf    Epithelial Cells 0-2 0 - 5 /hpf    Urine Casts 0-2 0 - 2 /lpf   EKG    Collection Time: 25  7:24 AM   Result Value Ref Range    Report       [facility] Emergency Dept.    Test Date:  2025  Pt Name:    SHARRON RUIZ                 Department: ER  MRN:        [id number]                      Room:        12  Gender:     Female                       Technician: [id number]  :        1940                   Requested By:ER TRIAGE PROTOCOL  Order #:    [id number]                    Reading MD: Henry Ga II, MD    Measurements  Intervals                                Axis  Rate:       59                           P:          -72  KS:         136                          QRS:        244  QRSD:       105                          T:          134  QT:         438  QTc:        434    Interpretive Statements  Pacemaker spikes or artifacts  Sinus or ectopic atrial bradycardia  No acute ST elevation or depression.  Impression: Paced rhythm. No acute repolarization changes.  Compared to ECG  11/13/2017 19:09:21  Bradycardia, nonsinus now present  Sinus bradycardia no longer present  Left anterior fascicula r block no longer present  Electronically Signed On 05- 07:24:13 PDT by Henry Ga II, MD       *Note: Due to a large number of results and/or encounters for the requested time period, some results have not been displayed. A complete set of results can be found in Results Review.       Radiology  CT-ABDOMEN-PELVIS WITH   Final Result      1.  Findings consistent with infectious/inflammatory colitis.   2.  Cholelithiasis.         [id number] VIEWS   Final Result         1.  Moderate quantity of stool in the colon favors changes of constipation, otherwise nonspecific bowel gas pattern.   2.  Left basilar atelectasis.          Medications:   New Prescriptions    No medications on file       My final assessment includes   1. Generalized abdominal pain    2. Dementia, unspecified dementia severity, unspecified dementia type, unspecified whether behavioral, psychotic, or mood disturbance or anxiety ([facility])    3. Colitis    4. Diarrhea, unspecified type        Upon Reevaluation, the patient's condition has: not improved; and will be escalated to hospitalization.    Patient discharged from ED Observation status at 10:02 AM  (Time) 5/8/2025  (Date).     Total time spent on this ED Observation discharge encounter is < 30 Minutes    Electronically signed by: Kain Lackey M.D., 5/8/2025 10:02 AM

## 2025-05-08 NOTE — ED NOTES
Patient insisted to ambulate to the bathroom, patient attempted but unable to walk further    Puriwick in place

## 2025-05-09 LAB
ADV 40+41 DNA STL QL NAA+NON-PROBE: NOT DETECTED
ALBUMIN SERPL BCP-MCNC: 3.3 G/DL (ref 3.2–4.9)
ALBUMIN/GLOB SERPL: 1.4 G/DL
ALP SERPL-CCNC: 47 U/L (ref 30–99)
ALT SERPL-CCNC: 10 U/L (ref 2–50)
ANION GAP SERPL CALC-SCNC: 9 MMOL/L (ref 7–16)
AST SERPL-CCNC: 19 U/L (ref 12–45)
ASTRO TYP 1-8 RNA STL QL NAA+NON-PROBE: NOT DETECTED
BILIRUB SERPL-MCNC: 0.3 MG/DL (ref 0.1–1.5)
BUN SERPL-MCNC: 46 MG/DL (ref 8–22)
C CAYETANENSIS DNA STL QL NAA+NON-PROBE: NOT DETECTED
C COLI+JEJ+UPSA DNA STL QL NAA+NON-PROBE: NOT DETECTED
CALCIUM ALBUM COR SERPL-MCNC: 9.3 MG/DL (ref 8.5–10.5)
CALCIUM SERPL-MCNC: 8.7 MG/DL (ref 8.5–10.5)
CHLORIDE SERPL-SCNC: 107 MMOL/L (ref 96–112)
CK SERPL-CCNC: 43 U/L (ref 0–154)
CO2 SERPL-SCNC: 21 MMOL/L (ref 20–33)
CREAT SERPL-MCNC: 1.66 MG/DL (ref 0.5–1.4)
CRYPTOSP DNA STL QL NAA+NON-PROBE: NOT DETECTED
E COLI SXT1+2 STL IA: NORMAL
E HISTOLYT DNA STL QL NAA+NON-PROBE: NOT DETECTED
EAEC PAA PLAS AGGR+AATA ST NAA+NON-PRB: NOT DETECTED
EC STX1+STX2 GENES STL QL NAA+NON-PROBE: NOT DETECTED
EPEC EAE GENE STL QL NAA+NON-PROBE: NOT DETECTED
ERYTHROCYTE [DISTWIDTH] IN BLOOD BY AUTOMATED COUNT: 53.6 FL (ref 35.9–50)
ETEC LTA+ST1A+ST1B TOX ST NAA+NON-PROBE: NOT DETECTED
G LAMBLIA DNA STL QL NAA+NON-PROBE: NOT DETECTED
GFR SERPLBLD CREATININE-BSD FMLA CKD-EPI: 30 ML/MIN/1.73 M 2
GLOBULIN SER CALC-MCNC: 2.4 G/DL (ref 1.9–3.5)
GLUCOSE SERPL-MCNC: 133 MG/DL (ref 65–99)
HCT VFR BLD AUTO: 45.9 % (ref 37–47)
HGB BLD-MCNC: 14.7 G/DL (ref 12–16)
MCH RBC QN AUTO: 29.4 PG (ref 27–33)
MCHC RBC AUTO-ENTMCNC: 32 G/DL (ref 32.2–35.5)
MCV RBC AUTO: 91.8 FL (ref 81.4–97.8)
NOROVIRUS GI+II RNA STL QL NAA+NON-PROBE: NOT DETECTED
P SHIGELLOIDES DNA STL QL NAA+NON-PROBE: NOT DETECTED
PLATELET # BLD AUTO: 246 K/UL (ref 164–446)
PMV BLD AUTO: 10.8 FL (ref 9–12.9)
POTASSIUM SERPL-SCNC: 5.1 MMOL/L (ref 3.6–5.5)
PROT SERPL-MCNC: 5.7 G/DL (ref 6–8.2)
RBC # BLD AUTO: 5 M/UL (ref 4.2–5.4)
RVA RNA STL QL NAA+NON-PROBE: NOT DETECTED
S ENT+BONG DNA STL QL NAA+NON-PROBE: NOT DETECTED
SAPO I+II+IV+V RNA STL QL NAA+NON-PROBE: NOT DETECTED
SHIGELLA SP+EIEC IPAH ST NAA+NON-PROBE: NOT DETECTED
SIGNIFICANT IND 70042: NORMAL
SITE SITE: NORMAL
SODIUM SERPL-SCNC: 137 MMOL/L (ref 135–145)
SOURCE SOURCE: NORMAL
V CHOL+PARA+VUL DNA STL QL NAA+NON-PROBE: NOT DETECTED
V CHOLERAE DNA STL QL NAA+NON-PROBE: NOT DETECTED
WBC # BLD AUTO: 12.7 K/UL (ref 4.8–10.8)
Y ENTEROCOL DNA STL QL NAA+NON-PROBE: NOT DETECTED

## 2025-05-09 PROCEDURE — 700102 HCHG RX REV CODE 250 W/ 637 OVERRIDE(OP): Performed by: STUDENT IN AN ORGANIZED HEALTH CARE EDUCATION/TRAINING PROGRAM

## 2025-05-09 PROCEDURE — 85027 COMPLETE CBC AUTOMATED: CPT

## 2025-05-09 PROCEDURE — 83735 ASSAY OF MAGNESIUM: CPT

## 2025-05-09 PROCEDURE — 700111 HCHG RX REV CODE 636 W/ 250 OVERRIDE (IP): Mod: JZ | Performed by: STUDENT IN AN ORGANIZED HEALTH CARE EDUCATION/TRAINING PROGRAM

## 2025-05-09 PROCEDURE — 82550 ASSAY OF CK (CPK): CPT

## 2025-05-09 PROCEDURE — 99233 SBSQ HOSP IP/OBS HIGH 50: CPT | Performed by: STUDENT IN AN ORGANIZED HEALTH CARE EDUCATION/TRAINING PROGRAM

## 2025-05-09 PROCEDURE — 97602 WOUND(S) CARE NON-SELECTIVE: CPT

## 2025-05-09 PROCEDURE — 97166 OT EVAL MOD COMPLEX 45 MIN: CPT

## 2025-05-09 PROCEDURE — 97535 SELF CARE MNGMENT TRAINING: CPT

## 2025-05-09 PROCEDURE — 51798 US URINE CAPACITY MEASURE: CPT

## 2025-05-09 PROCEDURE — 80053 COMPREHEN METABOLIC PANEL: CPT

## 2025-05-09 PROCEDURE — 84100 ASSAY OF PHOSPHORUS: CPT

## 2025-05-09 PROCEDURE — 700105 HCHG RX REV CODE 258: Performed by: STUDENT IN AN ORGANIZED HEALTH CARE EDUCATION/TRAINING PROGRAM

## 2025-05-09 PROCEDURE — A9270 NON-COVERED ITEM OR SERVICE: HCPCS | Performed by: STUDENT IN AN ORGANIZED HEALTH CARE EDUCATION/TRAINING PROGRAM

## 2025-05-09 PROCEDURE — 770001 HCHG ROOM/CARE - MED/SURG/GYN PRIV*

## 2025-05-09 PROCEDURE — 36415 COLL VENOUS BLD VENIPUNCTURE: CPT

## 2025-05-09 RX ADMIN — VENLAFAXINE HYDROCHLORIDE 37.5 MG: 75 TABLET ORAL at 09:19

## 2025-05-09 RX ADMIN — VENLAFAXINE HYDROCHLORIDE 37.5 MG: 75 TABLET ORAL at 16:55

## 2025-05-09 RX ADMIN — QUETIAPINE FUMARATE 25 MG: 25 TABLET ORAL at 04:41

## 2025-05-09 RX ADMIN — QUETIAPINE FUMARATE 25 MG: 25 TABLET ORAL at 16:55

## 2025-05-09 RX ADMIN — SODIUM CHLORIDE: 9 INJECTION, SOLUTION INTRAVENOUS at 05:00

## 2025-05-09 RX ADMIN — METRONIDAZOLE 500 MG: 500 INJECTION, SOLUTION INTRAVENOUS at 04:43

## 2025-05-09 RX ADMIN — LEVOTHYROXINE SODIUM 150 MCG: 0.15 TABLET ORAL at 05:11

## 2025-05-09 RX ADMIN — CEFTRIAXONE SODIUM 2000 MG: 10 INJECTION, POWDER, FOR SOLUTION INTRAVENOUS at 04:42

## 2025-05-09 RX ADMIN — ENOXAPARIN SODIUM 40 MG: 100 INJECTION SUBCUTANEOUS at 16:56

## 2025-05-09 RX ADMIN — METRONIDAZOLE 500 MG: 500 INJECTION, SOLUTION INTRAVENOUS at 16:59

## 2025-05-09 ASSESSMENT — COGNITIVE AND FUNCTIONAL STATUS - GENERAL
MOVING TO AND FROM BED TO CHAIR: A LITTLE
DAILY ACTIVITIY SCORE: 21
DRESSING REGULAR LOWER BODY CLOTHING: A LITTLE
DAILY ACTIVITIY SCORE: 18
TOILETING: A LITTLE
CLIMB 3 TO 5 STEPS WITH RAILING: A LOT
HELP NEEDED FOR BATHING: A LITTLE
DRESSING REGULAR LOWER BODY CLOTHING: A LITTLE
SUGGESTED CMS G CODE MODIFIER MOBILITY: CK
MOBILITY SCORE: 19
PERSONAL GROOMING: A LITTLE
STANDING UP FROM CHAIR USING ARMS: A LITTLE
TOILETING: A LITTLE
SUGGESTED CMS G CODE MODIFIER DAILY ACTIVITY: CJ
HELP NEEDED FOR BATHING: A LOT
SUGGESTED CMS G CODE MODIFIER DAILY ACTIVITY: CK
DRESSING REGULAR UPPER BODY CLOTHING: A LITTLE
WALKING IN HOSPITAL ROOM: A LITTLE

## 2025-05-09 ASSESSMENT — ACTIVITIES OF DAILY LIVING (ADL): TOILETING: INDEPENDENT

## 2025-05-09 ASSESSMENT — PAIN DESCRIPTION - PAIN TYPE: TYPE: ACUTE PAIN

## 2025-05-09 NOTE — DISCHARGE PLANNING
Post Acute Navigator Team    Patient screened based off of following information:    End of Life Care Index risk score 47  High LACE + score 69  6 click Mobility score -   6 click ADL score -   Readmission: no       Per chart review, post acute needs to be determined.     1500 Patient is discharging home under the care of [facility].    Please reach out to me directly should care team feel patient will benefit from a discharge goals of care conversation regarding outpatient palliative care or hospice.

## 2025-05-09 NOTE — PROGRESS NOTES
"Hospital Medicine Daily Progress Note    Date of Service  5/9/2025    Chief Complaint  Bernie Barrientos is a 84 y.o. female admitted 5/8/2025 with abdominal pain.    Hospital Course  From H&P: \"Bernie Barrientos is a 84 y.o. female who presented 5/8/2025 with past medical history of Alzheimer, hypothyroidism, who presented to the emergency department complaining of nausea vomiting and abdominal pain and nonbloody diarrhea.  Patient mentioned that this has been going on for almost 3 days and progressively worsening.  She denies any fever or chills.  She denies dysuria, urinary frequency or urgency. In emergency department patient was found to have a leukocytosis with CT abdomen and pelvis showed Findings consistent with infectious/inflammatory colitis.  In ED patient had 6-8 non-bloody diarrhea.  Patient received ceftriaxone and Flagyl.  I was requested to admit patient to the hospital for further management. \"    Interval Problem Update  Afebrile with normal pulse and respiratory rate, blood pressure 119/51, pulse ox 92% on 2 L nasal cannula.  Leukocytosis improving, new MICHA today normally CKD 3A, today serum creatinine bumped to 1.66 GFR 30.  On IV fluids, check CPK, bladder scan, monitor renal function, monitor urine output.  Still having diarrhea, no abdominal pain this morning.  No more emesis, tolerating p.o. intake.  Feeling weak but otherwise no new complaints.    I have discussed this patient's plan of care and discharge plan at IDT rounds today with Case Management, Nursing, Nursing leadership, and other members of the IDT team.    Consultants/Specialty  NA    Code Status  Full Code    Disposition  The patient is not medically cleared for discharge to home or a post-acute facility.      I have placed the appropriate orders for post-discharge needs.    Review of Systems  ROS   Review of Systems   Constitutional:  Positive for malaise/fatigue. Negative for chills and fever.   Respiratory:  Negative for cough.  "   Cardiovascular:  Negative for chest pain and leg swelling.   Gastrointestinal:  Positive for abdominal pain, diarrhea, nausea and vomiting.   Genitourinary:  Negative for dysuria, frequency and urgency.   Neurological:  Positive for weakness. Negative for dizziness.    Physical Exam  Temp:  [36.6 °C (97.8 °F)-36.9 °C (98.5 °F)] 36.7 °C (98 °F)  Pulse:  [60-87] 87  Resp:  [16-18] 17  BP: (119-152)/(51-67) 119/51  SpO2:  [91 %-97 %] 92 %    Physical Exam  Vitals and nursing note reviewed.   Constitutional:       General: She is not in acute distress.     Appearance: She is ill-appearing. She is not toxic-appearing.   HENT:      Head: Normocephalic and atraumatic.      Nose: Nose normal. No rhinorrhea.      Mouth/Throat:      Mouth: Mucous membranes are dry.      Pharynx: Oropharynx is clear.   Eyes:      General: No scleral icterus.     Extraocular Movements: Extraocular movements intact.      Conjunctiva/sclera: Conjunctivae normal.   Cardiovascular:      Rate and Rhythm: Normal rate and regular rhythm.      Pulses: Normal pulses.      Heart sounds: Murmur heard.   Pulmonary:      Effort: No respiratory distress.      Breath sounds: No wheezing, rhonchi or rales.   Abdominal:      General: There is no distension.      Palpations: Abdomen is soft.      Tenderness: There is abdominal tenderness (mild, generalized). There is no guarding or rebound.   Musculoskeletal:         General: Normal range of motion.      Cervical back: Normal range of motion and neck supple. No rigidity.      Right lower leg: Edema present.      Left lower leg: Edema present.   Skin:     General: Skin is warm and dry.      Capillary Refill: Capillary refill takes less than 2 seconds.   Neurological:      General: No focal deficit present.      Mental Status: She is alert. Mental status is at baseline. She is disoriented.      Cranial Nerves: No cranial nerve deficit.   Psychiatric:         Mood and Affect: Mood normal.         Behavior:  Behavior normal.         Thought Content: Thought content normal.         Judgment: Judgment normal.         Fluids    Intake/Output Summary (Last 24 hours) at 5/9/2025 0754  Last data filed at 5/8/2025 2021  Gross per 24 hour   Intake 120 ml   Output --   Net 120 ml        Laboratory  Recent Labs     05/08/25  0241 05/09/25  0515   WBC 14.1* 12.7*   RBC 5.08 5.00   HEMOGLOBIN 15.2 14.7   HEMATOCRIT 45.6 45.9   MCV 89.8 91.8   MCH 29.9 29.4   MCHC 33.3 32.0*   RDW 51.1* 53.6*   PLATELETCT 285 246   MPV 10.1 10.8     Recent Labs     05/08/25  0241 05/09/25  0515   SODIUM 138 137   POTASSIUM 4.5 5.1   CHLORIDE 104 107   CO2 20 21   GLUCOSE 152* 133*   BUN 33* 46*   CREATININE 1.23 1.66*   CALCIUM 9.8 8.7                   Imaging  CT-ABDOMEN-PELVIS WITH   Final Result      1.  Findings consistent with infectious/inflammatory colitis.   2.  Cholelithiasis.         [id number] VIEWS   Final Result         1.  Moderate quantity of stool in the colon favors changes of constipation, otherwise nonspecific bowel gas pattern.   2.  Left basilar atelectasis.           Assessment/Plan  * Colitis  Assessment & Plan  Nausea, vomiting, nonbloody diarrhea, and abdominal pain.  Has been going on for 3 days  CT abdomen in ED showed Findings consistent with infectious/inflammatory colitis   Received IV flagyl and ceftriaxone   Check ESR CRP  Stool culture and ova and parasite  GI panel  Check COVID panel  Check C. difficile  Continue ceftriaxone and Flagyl  Follow-up on cultures    Leukocytosis  Assessment & Plan  Due to colitis   Repeat CBC in am     Diarrhea  Assessment & Plan  Due to colitis   Follow up on cultures     Acquired hypothyroidism- (present on admission)  Assessment & Plan  Continue levothyroxine         VTE prophylaxis:    enoxaparin ppx      I have performed a physical exam and reviewed and updated ROS and Plan today (5/9/2025). In review of yesterday's note (5/8/2025), there are no changes except as documented  above.  Total time spent 52 minutes. I spent greater than 50% of the time for patient care, counseling, and coordination on this date, including unit/floor time, and face-to-face time with the patient as per interval events, my own review of patient's imaging and lab analysis and developing my assessment and plan above.

## 2025-05-09 NOTE — THERAPY
Occupational Therapy   Initial Evaluation     Patient Name: Bernie Barrientos  Age:  84 y.o., Sex:  female  Medical Record #: [id number]  Today's Date: 5/9/2025          Assessment    Patient is 84 y.o. female admitted for abdominal pain, colitis, diarrhea. Pt normally independent with functional mobility and ADLs living in a [facility] with daughter who works during the day. Pt able to complete all functional mobility and ADLs with SBA no AD required, anticipate pt is at or near functional baseline may benefit from more support at home during the day, no acute therapy needs identified. Will recommend home health.       Plan    DC Equipment Recommendations: (P) None  Discharge Recommendations: (P) Recommend home health for continued occupational therapy services     Objective       05/09/25 3108   Prior Living Situation   Prior Services None   Housing / Facility 1 Story House   Bathroom Set up Walk In Shower   Equipment Owned None   Lives with - Patient's Self Care Capacity Adult Children   Prior Level of ADL Function   Self Feeding Independent   Grooming / Hygiene Independent   Bathing Independent   Dressing Independent   Toileting Independent   Prior Level of IADL Function   Medication Management Requires Assist   Laundry Requires Assist   Kitchen Mobility Requires Assist   Finances Requires Assist   Home Management Requires Assist   Shopping Requires Assist   Prior Level Of Mobility Independent Without Device in Home   Driving / Transportation Relatives / Others Provide Transportation   Occupation (Pre-Hospital Vocational) Retired Due To Age   History of Falls   History of Falls No   Vitals   O2 (LPM) 2   O2 Delivery Device Nasal Cannula   Pain 0 - 10 Group   Therapist Pain Assessment Post Activity Pain Same as Prior to Activity;Nurse Notified   Cognition    Level of Consciousness Alert   Comments Pleasant, cooperative   Active ROM Upper Body   Active ROM Upper Body  WDL   Dominant Hand Right   Strength Upper Body   Upper  Body Strength  WDL   Sensation Upper Body   Upper Extremity Sensation  WDL   Upper Body Muscle Tone   Upper Body Muscle Tone  WDL   Neurological Concerns   Neurological Concerns No   Coordination Upper Body   Coordination WDL   Balance Assessment   Sitting Balance (Static) Fair   Sitting Balance (Dynamic) Fair   Standing Balance (Static) Fair   Standing Balance (Dynamic) Fair   Weight Shift Sitting Fair   Weight Shift Standing Fair   Comments no AD   Bed Mobility    Supine to Sit Supervised   Sit to Supine Supervised   Scooting Supervised   Rolling Supervised   ADL Assessment   Grooming Supervision;Standing   Upper Body Dressing Supervision   Lower Body Dressing Supervision   Toileting Supervision   How much help from another person does the patient currently need...   Putting on and taking off regular lower body clothing? 3   Bathing (including washing, rinsing, and drying)? 3   Toileting, which includes using a toilet, bedpan, or urinal? 3   Putting on and taking off regular upper body clothing? 4   Taking care of personal grooming such as brushing teeth? 4   Eating meals? 4   6 Clicks Daily Activity Score 21   Functional Mobility   Sit to Stand Supervised   Bed, Chair, Wheelchair Transfer Supervised   Toilet Transfers Supervised   Transfer Method Stand Step   Mobility bed mobility, bathroom mobility, up to sink, returned to bed   Comments no AD   Activity Tolerance   Sitting Edge of Bed 5 min   Standing 8 min   Education Group   Education Provided Role of Occupational Therapist   Role of Occupational Therapist Patient Response Patient;Acceptance;Explanation   Problem List   Problem List None   Anticipated Discharge Equipment and Recommendations   DC Equipment Recommendations None   Discharge Recommendations Recommend home health for continued occupational therapy services   Interdisciplinary Plan of Care Collaboration   IDT Collaboration with  Nursing   Patient Position at End of Therapy In Bed;Bed Alarm On;Call  Light within Reach;Tray Table within Reach;Phone within Reach   Collaboration Comments RN updated

## 2025-05-09 NOTE — CARE PLAN
The patient is Stable - Low risk of patient condition declining or worsening    Shift Goals  Clinical Goals: pt will remain free of falls this shift  Patient Goals: rest  Family Goals: JOHANNY    Progress made toward(s) clinical / shift goals:    Problem: Knowledge Deficit - Standard  Goal: Patient and family/care givers will demonstrate understanding of plan of care, disease process/condition, diagnostic tests and medications  Description: Target End Date:  1-3 days or as soon as patient condition allowsDocument in Patient Education1.  Patient and family/caregiver oriented to unit, equipment, visitation policy and means for communicating concern2.  Complete/review Learning Assessment3.  Assess knowledge level of disease process/condition, treatment plan, diagnostic tests and medications4.  Explain disease process/condition, treatment plan, diagnostic tests and medications  Outcome: Progressing  Note: Pt and pt daughter aware of plan of care. Daughter requesting update from MD        Patient is not progressing towards the following goals:

## 2025-05-09 NOTE — CARE PLAN
The patient is Stable - Low risk of patient condition declining or worsening    Shift Goals  Clinical Goals: Patient safety/remain free from fall this shift/ pain management  Patient Goals: Rest  Family Goals: JOHANNY    Progress made toward(s) clinical / shift goals:  Patient ambulates to the bathroom and back to bed, unsteady, hand held assist, send stool sample to lab, on IV abx, on cont IVF , bed in lowest position, resting comfortably, call light within reach.     Patient is not progressing towards the following goals:

## 2025-05-09 NOTE — DISCHARGE PLANNING
Case Management Discharge Planning    Admission Date: 5/8/2025  GMLOS: 2.5  ALOS: 1    6-Clicks ADL Score: 18  6-Clicks Mobility Score: 19      Anticipated Discharge Dispo: Discharge Disposition: D/T to home under HHA care in anticipation of covered skilled care (06)  Discharge Address: [address] [place] [address]  Discharge Contact Phone Number: [phone]    DME Needed: tbd    Action(s) Taken: Chart review completed.     Discussed patient during IDT rounds.    Pending PT/OT evaluations.     1430: RN CM completed dc planning assessment with patient's stepdaughter/DPOA Yoselyn. If PT/OT recommend HH, Yoselyn is agreeable to referral being sent to [facility].       Escalations Completed: dcp assessment    Medically Clear: No    Next Steps: RN CM to follow up with IDT regarding DCP needs/barriers.     Barriers to Discharge: Medical clearance and Pending PT Evaluation    Is the patient up for discharge tomorrow: tbd      Care Transition Team Assessment    Case Management role discussed with patient; Patient verbalized understanding of the Case Management role.     Demographics on facesheet verified.     Please see H&P for pertinent PMH and reason for admission.     Residence: Patient lives in [place], [place] and resides with her daughter Yoselyn in a one story house.   Primary Care Provider (PCP): Silviano ROBERTSON   Preferred Pharmacy: [facility] [address]  Advanced Directive: Patient has an advanced directive.  Medical Power of  (POA): Yoselyn Escobar [phone]  Support System: Patient's support system includes Yoselyn and some minimal support with pt's girlfriends. Yoselyn works in [place] and is often gone from the house for 12 hour shifts. She provides pt's medications prior to her leaving for work in the morning. She also provides her breakfast before she takes off for the day and then leaves food for her to eat during the day while she is gone. Pt will often forget to eat during the day while she is gone.   Durable  Medical Equipment (DME): No DME.   Past Services Utilized: Patient has used [facility] in the past.   Activities of Daily Living & Instrumental Activities of Daily Living: Prior to admission, patient was independent with all ADLs. Per Yoselyn, pt does only take showers once per week on Wednesday's prior to going out to her weekly outing with her girlfriends.   Substance Use History: Patient has no reported history of drug/etoh abuse.  Mental Health History: Patient has no reported history of mental health conditions.  Discharge Transportation: Yoselyn or possible one of pt's girlfriends will drive patient home upon discharge.      Information Source  Orientation Level: Oriented to person, Oriented to place, Disoriented to time, Disoriented to situation  Information Given By: Caregiver (Step daughter)  Informant's Name: Yoselyn Escobar  Who is responsible for making decisions for patient? : POA  Name(s) of Primary Decision Maker: Yoselyn Escobar [phone]    Readmission Evaluation  Is this a readmission?: No    Elopement Risk  Legal Hold: No  Ambulatory or Self Mobile in Wheelchair: No-Not an Elopement Risk  Elopement Risk: Not at Risk for Elopement    Interdisciplinary Discharge Planning  Primary Care Physician: Silviano ROBERTSON  Lives with - Patient's Self Care Capacity: Adult Children  Support Systems: Children  Housing / Facility: [address]  Do You Take your Prescribed Medications Regularly: Yes    Discharge Preparedness  What is your plan after discharge?: Home with help  What are your discharge supports?: Child  Prior Functional Level: Ambulatory, Independent with Activities of Daily Living, Needs Assist with Medication Management  Difficulity with ADLs: None  Difficulity with IADLs: Cooking, Driving, Keeping track of finances, Laundry, Managing medication, Shopping  Difficulity with IADL Comments: Needs assistance with IADLs from daughter    Functional Assesment  Prior Functional Level: Ambulatory, Independent with  Activities of Daily Living, Needs Assist with Medication Management    Finances  Financial Barriers to Discharge: No  Prescription Coverage: Yes    Advance Directive  Advance Directive?: DPOA for Health Care  Durable Power of  Name and Contact : Yoselyn Escobar [phone]    Domestic Abuse  Have you ever been the victim of abuse or violence?: No  Possible Abuse/Neglect Reported to:: Not Applicable    Psychological Assessment  History of Substance Abuse: None  History of Psychiatric Problems: No  Non-compliant with Treatment: No  Newly Diagnosed Illness: Yes    Discharge Risks or Barriers  Discharge risks or barriers?: Post-acute placement / services  Patient risk factors: Cognitive / sensory / physical deficit, Complex medical needs    Anticipated Discharge Information  Discharge Disposition: D/T to home under A care in anticipation of covered skilled care (06)  Discharge Address: [address] [place] [id number]  Discharge Contact Phone Number: [phone]

## 2025-05-10 LAB
ANION GAP SERPL CALC-SCNC: 8 MMOL/L (ref 7–16)
BASOPHILS # BLD AUTO: 0.6 % (ref 0–1.8)
BASOPHILS # BLD: 0.04 K/UL (ref 0–0.12)
BUN SERPL-MCNC: 27 MG/DL (ref 8–22)
CALCIUM SERPL-MCNC: 8.3 MG/DL (ref 8.5–10.5)
CHLORIDE SERPL-SCNC: 107 MMOL/L (ref 96–112)
CO2 SERPL-SCNC: 23 MMOL/L (ref 20–33)
CREAT SERPL-MCNC: 1.08 MG/DL (ref 0.5–1.4)
EOSINOPHIL # BLD AUTO: 0.11 K/UL (ref 0–0.51)
EOSINOPHIL NFR BLD: 1.6 % (ref 0–6.9)
ERYTHROCYTE [DISTWIDTH] IN BLOOD BY AUTOMATED COUNT: 53 FL (ref 35.9–50)
GFR SERPLBLD CREATININE-BSD FMLA CKD-EPI: 50 ML/MIN/1.73 M 2
GLUCOSE SERPL-MCNC: 92 MG/DL (ref 65–99)
HCT VFR BLD AUTO: 39 % (ref 37–47)
HGB BLD-MCNC: 12.6 G/DL (ref 12–16)
IMM GRANULOCYTES # BLD AUTO: 0.05 K/UL (ref 0–0.11)
IMM GRANULOCYTES NFR BLD AUTO: 0.7 % (ref 0–0.9)
LYMPHOCYTES # BLD AUTO: 0.72 K/UL (ref 1–4.8)
LYMPHOCYTES NFR BLD: 10.8 % (ref 22–41)
MAGNESIUM SERPL-MCNC: 2.8 MG/DL (ref 1.5–2.5)
MCH RBC QN AUTO: 29.9 PG (ref 27–33)
MCHC RBC AUTO-ENTMCNC: 32.3 G/DL (ref 32.2–35.5)
MCV RBC AUTO: 92.4 FL (ref 81.4–97.8)
MONOCYTES # BLD AUTO: 0.4 K/UL (ref 0–0.85)
MONOCYTES NFR BLD AUTO: 6 % (ref 0–13.4)
NEUTROPHILS # BLD AUTO: 5.37 K/UL (ref 1.82–7.42)
NEUTROPHILS NFR BLD: 80.3 % (ref 44–72)
NRBC # BLD AUTO: 0 K/UL
NRBC BLD-RTO: 0 /100 WBC (ref 0–0.2)
PHOSPHATE SERPL-MCNC: 4.3 MG/DL (ref 2.5–4.5)
PLATELET # BLD AUTO: 173 K/UL (ref 164–446)
PMV BLD AUTO: 10.7 FL (ref 9–12.9)
POTASSIUM SERPL-SCNC: 4.4 MMOL/L (ref 3.6–5.5)
RBC # BLD AUTO: 4.22 M/UL (ref 4.2–5.4)
SODIUM SERPL-SCNC: 138 MMOL/L (ref 135–145)
WBC # BLD AUTO: 6.7 K/UL (ref 4.8–10.8)

## 2025-05-10 PROCEDURE — 36415 COLL VENOUS BLD VENIPUNCTURE: CPT

## 2025-05-10 PROCEDURE — 700105 HCHG RX REV CODE 258: Performed by: STUDENT IN AN ORGANIZED HEALTH CARE EDUCATION/TRAINING PROGRAM

## 2025-05-10 PROCEDURE — 770001 HCHG ROOM/CARE - MED/SURG/GYN PRIV*

## 2025-05-10 PROCEDURE — 700102 HCHG RX REV CODE 250 W/ 637 OVERRIDE(OP): Performed by: STUDENT IN AN ORGANIZED HEALTH CARE EDUCATION/TRAINING PROGRAM

## 2025-05-10 PROCEDURE — A9270 NON-COVERED ITEM OR SERVICE: HCPCS | Performed by: STUDENT IN AN ORGANIZED HEALTH CARE EDUCATION/TRAINING PROGRAM

## 2025-05-10 PROCEDURE — 97162 PT EVAL MOD COMPLEX 30 MIN: CPT

## 2025-05-10 PROCEDURE — 85025 COMPLETE CBC W/AUTO DIFF WBC: CPT

## 2025-05-10 PROCEDURE — 80048 BASIC METABOLIC PNL TOTAL CA: CPT

## 2025-05-10 PROCEDURE — 99233 SBSQ HOSP IP/OBS HIGH 50: CPT | Performed by: STUDENT IN AN ORGANIZED HEALTH CARE EDUCATION/TRAINING PROGRAM

## 2025-05-10 PROCEDURE — 700111 HCHG RX REV CODE 636 W/ 250 OVERRIDE (IP): Performed by: STUDENT IN AN ORGANIZED HEALTH CARE EDUCATION/TRAINING PROGRAM

## 2025-05-10 RX ORDER — METRONIDAZOLE 500 MG/1
500 TABLET ORAL EVERY 12 HOURS
Status: DISCONTINUED | OUTPATIENT
Start: 2025-05-10 | End: 2025-05-11 | Stop reason: HOSPADM

## 2025-05-10 RX ADMIN — QUETIAPINE FUMARATE 25 MG: 25 TABLET ORAL at 06:10

## 2025-05-10 RX ADMIN — METRONIDAZOLE 500 MG: 500 INJECTION, SOLUTION INTRAVENOUS at 06:16

## 2025-05-10 RX ADMIN — QUETIAPINE FUMARATE 25 MG: 25 TABLET ORAL at 17:41

## 2025-05-10 RX ADMIN — VENLAFAXINE HYDROCHLORIDE 37.5 MG: 75 TABLET ORAL at 17:40

## 2025-05-10 RX ADMIN — VENLAFAXINE HYDROCHLORIDE 37.5 MG: 75 TABLET ORAL at 09:55

## 2025-05-10 RX ADMIN — METRONIDAZOLE 500 MG: 500 TABLET ORAL at 17:40

## 2025-05-10 RX ADMIN — CEFTRIAXONE SODIUM 2000 MG: 10 INJECTION, POWDER, FOR SOLUTION INTRAVENOUS at 06:08

## 2025-05-10 RX ADMIN — ENOXAPARIN SODIUM 40 MG: 100 INJECTION SUBCUTANEOUS at 17:41

## 2025-05-10 RX ADMIN — LEVOTHYROXINE SODIUM 150 MCG: 0.15 TABLET ORAL at 06:10

## 2025-05-10 ASSESSMENT — COGNITIVE AND FUNCTIONAL STATUS - GENERAL
MOBILITY SCORE: 23
CLIMB 3 TO 5 STEPS WITH RAILING: A LITTLE
SUGGESTED CMS G CODE MODIFIER MOBILITY: CI

## 2025-05-10 ASSESSMENT — GAIT ASSESSMENTS
GAIT LEVEL OF ASSIST: SUPERVISED
ASSISTIVE DEVICE: FRONT WHEEL WALKER
DISTANCE (FEET): 250
DEVIATION: DECREASED HEEL STRIKE;DECREASED TOE OFF

## 2025-05-10 ASSESSMENT — PAIN DESCRIPTION - PAIN TYPE: TYPE: ACUTE PAIN

## 2025-05-10 NOTE — CARE PLAN
The patient is Stable - Low risk of patient condition declining or worsening    Shift Goals  Clinical Goals: pt will remain free of falls this shift  Patient Goals: rest  Family Goals: danita    Progress made toward(s) clinical / shift goals:    Problem: Knowledge Deficit - Standard  Goal: Patient and family/care givers will demonstrate understanding of plan of care, disease process/condition, diagnostic tests and medications  Description: Target End Date:  1-3 days or as soon as patient condition allowsDocument in Patient Education1.  Patient and family/caregiver oriented to unit, equipment, visitation policy and means for communicating concern2.  Complete/review Learning Assessment3.  Assess knowledge level of disease process/condition, treatment plan, diagnostic tests and medications4.  Explain disease process/condition, treatment plan, diagnostic tests and medications  Outcome: Progressing  Note: Pt upset not discharging today, wants to go home. Aware of plan of care to discharge after getting home O2       Patient is not progressing towards the following goals:

## 2025-05-10 NOTE — FACE TO FACE
"Face to Face Note  -  Durable Medical Equipment    Minh Benedict M.D. - NPI: [id number]  I certify that this patient is under my care and that they had a durable medical equipment(DME)face to face encounter by myself that meets the physician DME face-to-face encounter requirements with this patient on:    Date of encounter:   Patient:                    MRN:                       YOB: 2026  Bernie Barrientos  [id number]  1940     The encounter with the patient was in whole, or in part, for the following medical condition, which is the primary reason for durable medical equipment:  Diabetes and Cardiac Disease    I certify that, based on my findings, the following durable medical equipment is medically necessary:    Oxygen   HOME O2 Saturation Measurements:(Values must be present for Home Oxygen orders)  Room air sat at rest: 90  Room air sat with amb: 85  With liters of O2: 0, O2 sat at rest with O2: 90  With Liters of O2: 2, O2 sat with amb with O2 : 92  Is the patient mobile?: Yes  If patient feels more short of breath, they can go up to 6 liters per minute and contact healthcare provider.    Supporting Symptoms: The patient requires supplemental oxygen, as the following interventions have been tried with limited or no improvement: \"Ambulation with oximetry and \"Incentive spirometry.    My Clinical findings support the need for the above equipment due to:  Hypoxia  "

## 2025-05-10 NOTE — DISCHARGE PLANNING
Case Management Discharge Planning    Admission Date: 5/8/2025  GMLOS: 2.5  ALOS: 2    6-Clicks ADL Score: 21  6-Clicks Mobility Score: 23      Anticipated Discharge Dispo: Discharge Disposition: D/T to home under HHA care in anticipation of covered skilled care (06)  Discharge Address: [address] [facility] [place] [address]  Discharge Contact Phone Number: [phone]    DME Needed: Yes    DME Ordered: No    Action(s) Taken: Chart review completed.     PT/OT recommending HH and a FWW.   Pending HH order.   Order in Epic for FWW.     Pt is requiring O2 on discharge.   Face to face and O2 order in [facility].     RN CM needs to obtain choice from Yoselyn for DME. HH choice already in media.     1345: RN CM called and left vm with Yoselyn requesting call back.     1413: Yoselyn called FUNMILAYO LAWRENCE back. Per Yoselyn DME choice will be [facility]. Yoselyn is currently working up in [place] and wont be home until later today. If pt is mc and dme is all delivered Yoselyn has shared she wont be able to pick her up until the earliest being tomorrow.     1538: DME order sent to [facility] at 1427. Currently pending HH order.     Escalations Completed: other    Medically Clear: Yes    Next Steps: Get DME choice and send HH referral once order is placed.     Barriers to Discharge: DME, Outpatient referrals pending, and Oxygen Delivery    Is the patient up for discharge tomorrow: tbd

## 2025-05-10 NOTE — WOUND TEAM
[facility]  Inpatient Services  Wound and Skin Care Brief Evaluation    Admission Date: 5/8/2025     Last order of IP CONSULT TO WOUND CARE was found on 5/8/2025 from Hospital Encounter on 5/8/2025     HPI, PMH, SH: Reviewed    Chief Complaint   Patient presents with    Abdominal Pain     Pt reports sudden onset of abdominal pain this morning, pt denies previous sickness, fevers, N/V/D.      Diagnosis: Diarrhea [R19.7]    Unit where seen by Wound Team: S619/00     Wound consult placed regarding sacrum. Chart and images reviewed. This clinician in to assess patient. Patient pleasant and agreeable. In pictures from 5/8, it appeared there was a light red linear line running horizontally to the upper right buttock. Today the line is very faint, and after performing the 15> second blanching test, the area is blanching. Patient demonstrated she can turn self in bed, area left open to air. Non-selectively debrided with N/A.     No pressure injuries or advanced wound care needs identified. Wound consult completed. No further follow up unless indicated and consulted.      Sacrum        PREVENTATIVE INTERVENTIONS:    Q shift Munir - performed per nursing policy  Q shift pressure point assessments - performed per nursing policy    Surface/Positioning  Standard/trauma mattress - Currently in Place    Containment/Moisture Prevention    Dri-byron pad - Currently in Place

## 2025-05-10 NOTE — THERAPY
Physical Therapy   Initial Evaluation     Patient Name: Bernie Barrientos  Age:  84 y.o., Sex:  female  Medical Record #: [id number]  Today's Date: 5/10/2025     Precautions  Precautions: (P) Fall Risk  Comments: (P) special contact    Assessment  Patient is 84 y.o. female with colitis. PMH includes AD, hypothyroidism. Pt reports she lives with her stepdaughter, who works daytime. Pt states she was independent with self care and mobility at baseline sans AD. Today, pt demonstrated satisfactory ability with bed mobility, tranfers, ambulation with FWW. Pt with no further need for skilled PT in the acute setting at this time. Recommend further PT in the home health setting upon D/C home.      Plan    Physical Therapy Initial Treatment Plan   Duration: (P) Evaluation only    DC Equipment Recommendations: (P) Front-Wheel Walker  Discharge Recommendations: (P) Recommend further PT in the home health setting upon D/C home.     Subjective    Pt stating it fells good to be up out of bed.     Objective       05/10/25 1132   Precautions   Precautions Fall Risk   Comments special contact   Vitals   Pulse Oximetry 92 %   O2 (LPM) 1   O2 Delivery Device Nasal Cannula   Vitals Comments drops to 85% on room air with amb; able to maintain 92% with 1L SpO2 wth amb   Pain 0 - 10 Group   Therapist Pain Assessment 0;Post Activity Pain Same as Prior to Activity   Prior Living Situation   Prior Services Home-Independent   Housing / Facility [address]   Steps Into Home 0   Steps In Home 0   Equipment Owned None   Lives with - Patient's Self Care Capacity Adult Children   Prior Level of Functional Mobility   Bed Mobility Independent   Transfer Status Independent   Ambulation Independent   Ambulation Distance commmunity   Assistive Devices Used None   Stairs Independent   Comments likes to garden   History of Falls   History of Falls No   Cognition    Cognition / Consciousness WDL   Comments cooperative, pleasant   Strength Lower Body    Comments WFL for mobility   Sensation Lower Body   Comments no N/T reported   Lower Body Muscle Tone   Comments WFL for mobility   Coordination Lower Body    Comments WFL for mobility   Balance Assessment   Sitting Balance (Static) Good   Sitting Balance (Dynamic) Good   Standing Balance (Static) Fair +   Standing Balance (Dynamic) Fair   Weight Shift Sitting Good   Weight Shift Standing Fair   Comments with FWW   Bed Mobility    Supine to Sit Supervised   Scooting Supervised   Rolling Supervised   Gait Analysis   Gait Level Of Assist Supervised   Assistive Device Front Wheel Walker   Distance (Feet) 250   # of Times Distance was Traveled 1   Deviation Decreased Heel Strike;Decreased Toe Off   # of Stairs Climbed 0   Weight Bearing Status no restriction   Comments tolerated well   Functional Mobility   Sit to Stand Supervised   Bed, Chair, Wheelchair Transfer Supervised   Transfer Method Stand Step   Mobility supine->sit EOB->stand->amb->chair   Comments toleratd well, no LOB   6 Clicks Assessment - How much HELP from from another person do you currently need... (If the patient hasn't done an activity recently, how much help from another person do you think he/she would need if he/she tried?)   Turning from your back to your side while in a flat bed without using bedrails? 4   Moving from lying on your back to sitting on the side of a flat bed without using bedrails? 4   Moving to and from a bed to a chair (including a wheelchair)? 4   Standing up from a chair using your arms (e.g., wheelchair, or bedside chair)? 4   Walking in hospital room? 4   Climbing 3-5 steps with a railing? 3   6 clicks Mobility Score 23   Activity Tolerance   Sitting in Chair left up in chair   Sitting Edge of Bed 2 min approx   Standing 10 min approx   Comments tolerated well   Patient / Family Goals    Patient / Family Goal #1 go home, garden   Education Group   Education Provided Role of Physical Therapist   Role of Physical Therapist  Patient Response Patient;Acceptance;Explanation;Verbal Demonstration   Physical Therapy Initial Treatment Plan    Duration Evaluation only   Problem List    Problems Decreased Activity Tolerance   Anticipated Discharge Equipment and Recommendations   DC Equipment Recommendations Front-Wheel Walker   Discharge Recommendations Recommend further PT in the home health setting upon D/C home

## 2025-05-10 NOTE — PROGRESS NOTES
"Hospital Medicine Daily Progress Note    Date of Service  5/10/2025    Chief Complaint  Bernie Barrientos is a 84 y.o. female admitted 5/8/2025 with abdominal pain.    Hospital Course  From H&P: \"Bernie Barrientos is a 84 y.o. female who presented 5/8/2025 with past medical history of Alzheimer, hypothyroidism, who presented to the emergency department complaining of nausea vomiting and abdominal pain and nonbloody diarrhea.  Patient mentioned that this has been going on for almost 3 days and progressively worsening.  She denies any fever or chills.  She denies dysuria, urinary frequency or urgency. In emergency department patient was found to have a leukocytosis with CT abdomen and pelvis showed Findings consistent with infectious/inflammatory colitis.  In ED patient had 6-8 non-bloody diarrhea.  Patient received ceftriaxone and Flagyl.  I was requested to admit patient to the hospital for further management. \"    Interval Problem Update  Afebrile with normal pulse and respiratory rate, blood pressure 119/51, pulse ox 92% on 2 L nasal cannula.  Leukocytosis improving, new MICHA today normally CKD 3A, today serum creatinine bumped to 1.66 GFR 30.  On IV fluids, check CPK, bladder scan, monitor renal function, monitor urine output.  Still having diarrhea, no abdominal pain this morning.  No more emesis, tolerating p.o. intake.  Feeling weak but otherwise no new complaints.    5/10  Afebrile with normal pulse and respiratory rate systolic blood pressure 130s to 140s pulse ox 90 to 97% on 2 L nasal cannula.  Leukocytosis resolved, normal hemoglobin and platelet count.  BUN 27, serum creatinine normalized.  Diarrhea significantly slowed down, had 2 soft bowel movements this morning.  Pain controlled.  Requires home O2, case management will reach out to family and obtain choice.      I have discussed this patient's plan of care and discharge plan at IDT rounds today with Case Management, Nursing, Nursing leadership, and other " members of the IDT team.    Consultants/Specialty  NA    Code Status  Full Code    Disposition  The patient is not medically cleared for discharge to home or a post-acute facility.      I have placed the appropriate orders for post-discharge needs.    Review of Systems  ROS   Review of Systems   Constitutional:  Positive for malaise/fatigue. Negative for chills and fever.   Respiratory:  Negative for cough.    Cardiovascular:  Negative for chest pain and leg swelling.   Gastrointestinal:  Positive for abdominal pain, diarrhea, nausea and vomiting.   Genitourinary:  Negative for dysuria, frequency and urgency.   Neurological:  Positive for weakness. Negative for dizziness.    Physical Exam  Temp:  [36.1 °C (97 °F)-36.7 °C (98 °F)] 36.6 °C (97.8 °F)  Pulse:  [63-77] 63  Resp:  [15-17] 17  BP: (128-142)/(55-71) 142/71  SpO2:  [92 %-97 %] 97 %    Physical Exam  Vitals and nursing note reviewed.   Constitutional:       General: She is not in acute distress.     Appearance: She is ill-appearing. She is not toxic-appearing.   HENT:      Head: Normocephalic and atraumatic.      Nose: Nose normal. No rhinorrhea.      Mouth/Throat:      Mouth: Mucous membranes are dry.      Pharynx: Oropharynx is clear.   Eyes:      General: No scleral icterus.     Extraocular Movements: Extraocular movements intact.      Conjunctiva/sclera: Conjunctivae normal.   Cardiovascular:      Rate and Rhythm: Normal rate and regular rhythm.      Pulses: Normal pulses.      Heart sounds: Murmur heard.   Pulmonary:      Effort: No respiratory distress.      Breath sounds: No wheezing, rhonchi or rales.   Abdominal:      General: There is no distension.      Palpations: Abdomen is soft.      Tenderness: There is abdominal tenderness (mild, generalized). There is no guarding or rebound.   Musculoskeletal:         General: Normal range of motion.      Cervical back: Normal range of motion and neck supple. No rigidity.      Right lower leg: Edema present.       Left lower leg: Edema present.   Skin:     General: Skin is warm and dry.      Capillary Refill: Capillary refill takes less than 2 seconds.   Neurological:      General: No focal deficit present.      Mental Status: She is alert. Mental status is at baseline. She is disoriented.      Cranial Nerves: No cranial nerve deficit.   Psychiatric:         Mood and Affect: Mood normal.         Behavior: Behavior normal.         Thought Content: Thought content normal.         Judgment: Judgment normal.         Fluids    Intake/Output Summary (Last 24 hours) at 5/10/2025 1618  Last data filed at 5/10/2025 0608  Gross per 24 hour   Intake 240 ml   Output --   Net 240 ml        Laboratory  Recent Labs     05/08/25  0241 05/09/25  0515 05/10/25  0822   WBC 14.1* 12.7* 6.7   RBC 5.08 5.00 4.22   HEMOGLOBIN 15.2 14.7 12.6   HEMATOCRIT 45.6 45.9 39.0   MCV 89.8 91.8 92.4   MCH 29.9 29.4 29.9   MCHC 33.3 32.0* 32.3   RDW 51.1* 53.6* 53.0*   PLATELETCT 285 246 173   MPV 10.1 10.8 10.7     Recent Labs     05/08/25  0241 05/09/25  0515 05/10/25  0822   SODIUM 138 137 138   POTASSIUM 4.5 5.1 4.4   CHLORIDE 104 107 107   CO2 20 21 23   GLUCOSE 152* 133* 92   BUN 33* 46* 27*   CREATININE 1.23 1.66* 1.08   CALCIUM 9.8 8.7 8.3*                   Imaging  CT-ABDOMEN-PELVIS WITH   Final Result      1.  Findings consistent with infectious/inflammatory colitis.   2.  Cholelithiasis.         [id number] VIEWS   Final Result         1.  Moderate quantity of stool in the colon favors changes of constipation, otherwise nonspecific bowel gas pattern.   2.  Left basilar atelectasis.           Assessment/Plan  * Colitis  Assessment & Plan  Nausea, vomiting, nonbloody diarrhea, and abdominal pain.  Has been going on for 3 days  CT abdomen in ED showed Findings consistent with infectious/inflammatory colitis   Received IV flagyl and ceftriaxone   Check ESR CRP  Stool culture and ova and parasite  GI panel  Check COVID panel  Check C.  difficile  Continue ceftriaxone and Flagyl  Follow-up on cultures    Leukocytosis  Assessment & Plan  Due to colitis   Repeat CBC in am     Diarrhea  Assessment & Plan  Due to colitis   Follow up on cultures     Moderate late onset Alzheimer's dementia with agitation (HCC)- (present on admission)  Assessment & Plan  Aspiration and fall precautions.  -Limit sedatives, attempt to minimize risk of delirium such as avoiding day time napping and promote night time sleep, frequent reorientation, monitor for constipation, remove lines/tubing not needed, avoid early lab draws and vital checks, limit polypharmacy as able, and keep close to the window           Acquired hypothyroidism- (present on admission)  Assessment & Plan  Continue levothyroxine         VTE prophylaxis:    enoxaparin ppx      I have performed a physical exam and reviewed and updated ROS and Plan today (5/10/2025). In review of yesterday's note (5/9/2025), there are no changes except as documented above.  Total time spent 53 minutes. I spent greater than 50% of the time for patient care, counseling, and coordination on this date, including unit/floor time, and face-to-face time with the patient as per interval events, my own review of patient's imaging and lab analysis and developing my assessment and plan above.

## 2025-05-10 NOTE — CARE PLAN
The patient is Stable - Low risk of patient condition declining or worsening    Shift Goals  Clinical Goals: pt will remain free from GLF throuh out my shift and wean pt off O2 NC to RA  Patient Goals: rest  Family Goals: danita    Progress made toward(s) clinical / shift goals:  pt is A&Ox 2, oriented to person and place. Currently on 1L NC, pt was on 2L during day shift and pt baseline is RA. Patient 02 saturation drops to mid-high 80's when O2 is completely removed. Patient pt follows commands but does not use call light. Pateint was ambulated to restroom using FWW and is SBA. Bed alarm is on and call light is left within reach.    Patient is not progressing towards the following goals:unsuccessful to completely weaning pt off from O2 NC

## 2025-05-10 NOTE — PROGRESS NOTES
"735: Pt refused home O2 eval this AM, stating she did not want to walk and she wanted \"to be left alone\". Told pt that delaying O2 eval can delay discharge if she does require oxygen to go home. Will reattempt later     955: asked pt if she is now willing to go for a walk to complete home O2 eval, pt refused again and said she is still not wanting to walk right now. Told pt we will take a walk before lunch. Pt agreeable to this plan.   "

## 2025-05-10 NOTE — ASSESSMENT & PLAN NOTE
Aspiration and fall precautions.  -Limit sedatives, attempt to minimize risk of delirium such as avoiding day time napping and promote night time sleep, frequent reorientation, monitor for constipation, remove lines/tubing not needed, avoid early lab draws and vital checks, limit polypharmacy as able, and keep close to the window

## 2025-05-11 ENCOUNTER — HOME HEALTH ADMISSION (OUTPATIENT)
Dept: HOME HEALTH SERVICES | Facility: HOME HEALTHCARE | Age: 85
End: 2025-05-11
Payer: MEDICARE

## 2025-05-11 VITALS
OXYGEN SATURATION: 95 % | RESPIRATION RATE: 16 BRPM | WEIGHT: 150 LBS | BODY MASS INDEX: 22.73 KG/M2 | HEART RATE: 60 BPM | SYSTOLIC BLOOD PRESSURE: 148 MMHG | TEMPERATURE: 98.2 F | DIASTOLIC BLOOD PRESSURE: 74 MMHG | HEIGHT: 68 IN

## 2025-05-11 LAB
BACTERIA STL CULT: NORMAL
E COLI SXT1+2 STL IA: NORMAL
SIGNIFICANT IND 70042: NORMAL
SITE SITE: NORMAL
SOURCE SOURCE: NORMAL

## 2025-05-11 PROCEDURE — 700105 HCHG RX REV CODE 258: Performed by: STUDENT IN AN ORGANIZED HEALTH CARE EDUCATION/TRAINING PROGRAM

## 2025-05-11 PROCEDURE — 700111 HCHG RX REV CODE 636 W/ 250 OVERRIDE (IP): Performed by: STUDENT IN AN ORGANIZED HEALTH CARE EDUCATION/TRAINING PROGRAM

## 2025-05-11 PROCEDURE — A9270 NON-COVERED ITEM OR SERVICE: HCPCS | Performed by: STUDENT IN AN ORGANIZED HEALTH CARE EDUCATION/TRAINING PROGRAM

## 2025-05-11 PROCEDURE — 700102 HCHG RX REV CODE 250 W/ 637 OVERRIDE(OP): Performed by: STUDENT IN AN ORGANIZED HEALTH CARE EDUCATION/TRAINING PROGRAM

## 2025-05-11 RX ADMIN — LEVOTHYROXINE SODIUM 150 MCG: 0.15 TABLET ORAL at 05:12

## 2025-05-11 RX ADMIN — METRONIDAZOLE 500 MG: 500 TABLET ORAL at 05:12

## 2025-05-11 RX ADMIN — CEFTRIAXONE SODIUM 2000 MG: 10 INJECTION, POWDER, FOR SOLUTION INTRAVENOUS at 05:10

## 2025-05-11 RX ADMIN — VENLAFAXINE HYDROCHLORIDE 37.5 MG: 75 TABLET ORAL at 09:25

## 2025-05-11 RX ADMIN — QUETIAPINE FUMARATE 25 MG: 25 TABLET ORAL at 05:13

## 2025-05-11 ASSESSMENT — PAIN DESCRIPTION - PAIN TYPE: TYPE: ACUTE PAIN

## 2025-05-11 NOTE — CARE PLAN
The patient is Stable - Low risk of patient condition declining or worsening    Shift Goals  Clinical Goals: pt will remain free of falls this shift  Patient Goals: go home  Family Goals: danita    Progress made toward(s) clinical / shift goals:    Problem: Knowledge Deficit - Standard  Goal: Patient and family/care givers will demonstrate understanding of plan of care, disease process/condition, diagnostic tests and medications  Description: Target End Date:  1-3 days or as soon as patient condition allowsDocument in Patient Education1.  Patient and family/caregiver oriented to unit, equipment, visitation policy and means for communicating concern2.  Complete/review Learning Assessment3.  Assess knowledge level of disease process/condition, treatment plan, diagnostic tests and medications4.  Explain disease process/condition, treatment plan, diagnostic tests and medications  Outcome: Progressing  Note: Home O2 redone, waiting for home O2 delivery, discharge after        Patient is not progressing towards the following goals:

## 2025-05-11 NOTE — FACE TO FACE
"Face to Face Note  -  Durable Medical Equipment    Minh Benedict M.D. - NPI: [id number]  I certify that this patient is under my care and that they had a durable medical equipment(DME)face to face encounter by myself that meets the physician DME face-to-face encounter requirements with this patient on:    Date of encounter:   Patient:                    MRN:                       YOB: 2026  Bernie Barrientos  [id number]  1940     The encounter with the patient was in whole, or in part, for the following medical condition, which is the primary reason for durable medical equipment:  Diabetes, Cardiac Disease, and Other - dementia    I certify that, based on my findings, the following durable medical equipment is medically necessary:    Oxygen   HOME O2 Saturation Measurements:(Values must be present for Home Oxygen orders)  Room air sat at rest: 91  Room air sat with amb: 86  With liters of O2: 0, O2 sat at rest with O2: 91  With Liters of O2: 2, O2 sat with amb with O2 : 92  Is the patient mobile?: Yes  If patient feels more short of breath, they can go up to 6 liters per minute and contact healthcare provider.    Supporting Symptoms: The patient requires supplemental oxygen, as the following interventions have been tried with limited or no improvement: \"Ambulation with oximetry and \"Incentive spirometry.    My Clinical findings support the need for the above equipment due to:  Hypoxia  "

## 2025-05-11 NOTE — DISCHARGE PLANNING
Case Management Discharge Planning    Admission Date: 5/8/2025  GMLOS: 2.5  ALOS: 3    6-Clicks ADL Score: 21  6-Clicks Mobility Score: 23      Anticipated Discharge Dispo: Discharge Disposition: D/T to home under A care in anticipation of covered skilled care (06)  Discharge Address: [address] [place] [address]  Discharge Contact Phone Number: [phone]    DME Needed: Yes    DME Ordered: Yes    She will go home on a new O2 set up through [employer].    Action(s) Taken: Updated Provider/Nurse on Discharge Plan and Referral(s) sent    Escalations Completed: None    Medically Clear: Yes    Next Steps: Yoselyn will be here and  Bernie to transport home.    Barriers to Discharge: None

## 2025-05-11 NOTE — DISCHARGE PLANNING
ATTN: Case Management  RE: Referral for Home Health    As of 5/11/25, we have accepted the Home Health referral for the patient listed above.    [facility]  will contact the patient within 48 hours. If you have any questions or concerns regarding the patient’s transition to Home Health, please do not hesitate to contact us at [phone].      We look forward to collaborating with you,  [facility] Team

## 2025-05-11 NOTE — CARE PLAN
The patient is Stable - Low risk of patient condition declining or worsening    Shift Goals  Clinical Goals: pt will remain free from GLF through out my shift  Patient Goals: go home  Family Goals: danita    Progress made toward(s) clinical / shift goals:  pt is A&Ox3 disoriented to situation. Currently on 1L NC while sleeping other wise pt is RA, but when pt ambulates they need O2. Patient has bed alarm on, pt does not use call light. Patient is STB with FWW. Patient follows commands and is pleasant to work with.    Patient is not progressing towards the following goals:

## 2025-05-11 NOTE — PROGRESS NOTES
Home O2 equipment delivered to bedside. Called step daughter Yoselyn and was not picked up and had to leave a voicemail. Informed Yoselyn that home O2 equipment was delivered and that pt is ready to be discharged and to please call back to inform when she will be here to take pt home.

## 2025-05-11 NOTE — DISCHARGE PLANNING
HTH/SCP TCN chart review completed. Current discharge considerations are for Home with Home Health, FWW and close outpatient f/u when medically cleared.  Per chart review, patient has a discharge order.  She is currently on 2 L/min O2.  DME O2 choice obtaind by CM for 1) [facility] and 2) [facility] and HH choice obtained by CM for 1) [facility], 2) [facility] and 3) [facility].  [facility] and [facility] O2 is pending.      Current 6 clicks mobility at 23 and per kardex patient ambulating 10 feet X 2 no AD.  Please reach out to TCN via voalte if any additional transitional discharge planning needs are indicated. Thank you.    Completed:  [facility] is pending.    PT recommends HH on 5/10 and a FWW.    OT recommends HH on 5/9.    Choice obtained: for HH and O2 by CM.    SCP with [facility].  Patient is followed by [facility].      Addendum:  1214- Per chart review, [facility] has accepted and O2 FTF completed by M.D.

## 2025-05-12 ENCOUNTER — TELEPHONE (OUTPATIENT)
Dept: HOME HEALTH SERVICES | Facility: HOME HEALTHCARE | Age: 85
End: 2025-05-12
Payer: MEDICARE

## 2025-05-12 NOTE — TELEPHONE ENCOUNTER
Home Transitional Care Management (TCM) attempted to contact Yoselyn by telephone. Voicemail left with request for call back.

## 2025-05-12 NOTE — TELEPHONE ENCOUNTER
"Home Transitional Care Management (TCM) Initial Contact Within 48 Hours of Discharge From Inpatient Setting:    This RN verfied the patient has [facility] ([facility]) and [facility] ([facility]).    This RN contacted Yoselyn via telephone.  This RN introduced self and provided a brief discription of [facility] TCM program and explained what services Home TCM provides.  This RN asked if they would like to utilize Home TCM for optimal care.   Brenie declined to proceed with Home TCM services. \"Bernie does not like a lot of people in the house and it will piss her off.\"    Now that you are home, how are you feelings: Bernie is resistant to care per Yoselyn's report.   Do you have a follow up appointment scheduled with your PCP and or specialists: yes  Appointment date(s): 5/15/2025 behavioral health   Are you able to get to your appointment(s): yes  Assistance needed scheduling follow up appointments or establishing with PCP: no  Has [facility] contacted you: no SOC scheduled: TBD      Did you receive any new prescriptions: yes  Where you able to get your new prescriptions: yes  Yoselyn reported that \"the new medicines a helping a little.\"  Medication questions answered: no    Do you have and barriers to health care such as transportation, food, paying for medications, lack of durable medical equipment or lack of caregivers: Yoselyn is challenged with Bernie's behaviors and resistance to care.  Who is helping you at home: Yoselyn and caregivers    Do you have questions or concerns regarding your reason for admission to the hospital: no  Do you have any questions regarding your discharge instructions: no  Education provided regarding signs and symptoms for hypoxia and when to contact Home TCM before next appointment with questions or concerns.    TCM home care contact information provided.    Current or previous attempts completed within 2 business days of discharge: 1                        "

## 2025-05-12 NOTE — PROGRESS NOTES
AVS reviewed w/ pt and pt's step daughter Yoselyn. Instructed to call home O2 company once home to receive instructions for set up at home. No meds to beds ordered for patient. Transported by staff to vehicle for discharge

## 2025-05-13 ENCOUNTER — TELEPHONE (OUTPATIENT)
Dept: HOME HEALTH SERVICES | Facility: HOME HEALTHCARE | Age: 85
End: 2025-05-13
Payer: MEDICARE

## 2025-05-13 NOTE — TELEPHONE ENCOUNTER
Called patient's other Yoselyn and left VM regarding HH scheduling. Yoselyn called back and stated tomorrow would not work due to conflicting appointments. Agreeable to a call in the next few days once something opens up. Later start of care for 5/15 per request. Email consents to:  [email or web address]"

## 2025-05-14 ENCOUNTER — TELEPHONE (OUTPATIENT)
Dept: HOME HEALTH SERVICES | Facility: HOME HEALTHCARE | Age: 85
End: 2025-05-14
Payer: MEDICARE

## 2025-05-14 NOTE — TELEPHONE ENCOUNTER
Spoke to Yoselyn, pt is unavailable for SOC tomorrow due to another appointment and would like a call back tomorrow.

## 2025-05-15 ENCOUNTER — TELEPHONE (OUTPATIENT)
Dept: HOME HEALTH SERVICES | Facility: HOME HEALTHCARE | Age: 85
End: 2025-05-15
Payer: MEDICARE

## 2025-05-15 LAB — OVA AND PARASITE, FECAL INTERPRETATION Q0595: NEGATIVE

## 2025-05-16 ENCOUNTER — TELEPHONE (OUTPATIENT)
Dept: HOME HEALTH SERVICES | Facility: HOME HEALTHCARE | Age: 85
End: 2025-05-16
Payer: MEDICARE

## 2025-05-16 NOTE — TELEPHONE ENCOUNTER
Called Yoselyn's mobile number and left voicemail regarding scheduling. Later start of care requested for 5/17 - No Contact

## 2025-05-17 ENCOUNTER — TELEPHONE (OUTPATIENT)
Dept: HOME HEALTH SERVICES | Facility: HOME HEALTHCARE | Age: 85
End: 2025-05-17
Payer: MEDICARE

## 2025-05-17 NOTE — TELEPHONE ENCOUNTER
Called and spoke with daughter Yoselyn about scheduling. Declined weekend appointment as patient will be moving to [place] on Monday and daughter would like us to start services when patient is settled. Later start of care requested for 5/19 - Per request

## 2025-05-19 ENCOUNTER — TELEPHONE (OUTPATIENT)
Dept: HOME HEALTH SERVICES | Facility: HOME HEALTHCARE | Age: 85
End: 2025-05-19
Payer: MEDICARE

## 2025-05-19 NOTE — TELEPHONE ENCOUNTER
Called patient's daughter and left voicemail regarding scheduling.     12:10pm - Daughter called home health office and left voicemail. Called daughter back and spoke with her about scheduling.     Patient scheduled for a start of care appointment on 5/20 between 12:30-1:30pm

## 2025-05-19 NOTE — TELEPHONE ENCOUNTER
Per voicemail from daughter, patient is not going to [place] until tomorrow. Start of care appointment canceled for tomorrow.

## 2025-05-20 ENCOUNTER — TELEPHONE (OUTPATIENT)
Dept: HOME HEALTH SERVICES | Facility: HOME HEALTHCARE | Age: 85
End: 2025-05-20
Payer: MEDICARE

## 2025-05-20 NOTE — TELEPHONE ENCOUNTER
Spoke to daughter, she is very overwhelmed and frustrated with getting living situation settled.  She requests that we do not call in the next couple of days as there is a lot going on, she will call us once living arrangement is settled. Later start of care requested for 5/25 per request.

## 2025-05-22 ENCOUNTER — PHARMACY VISIT (OUTPATIENT)
Dept: PHARMACY | Facility: MEDICAL CENTER | Age: 85
End: 2025-05-22
Payer: COMMERCIAL

## 2025-05-22 ENCOUNTER — HOSPITAL ENCOUNTER (EMERGENCY)
Facility: MEDICAL CENTER | Age: 85
End: 2025-05-22
Attending: EMERGENCY MEDICINE
Payer: MEDICARE

## 2025-05-22 VITALS
TEMPERATURE: 98.2 F | HEART RATE: 79 BPM | HEIGHT: 68 IN | OXYGEN SATURATION: 92 % | BODY MASS INDEX: 22.73 KG/M2 | WEIGHT: 150 LBS | SYSTOLIC BLOOD PRESSURE: 106 MMHG | DIASTOLIC BLOOD PRESSURE: 51 MMHG | RESPIRATION RATE: 18 BRPM

## 2025-05-22 DIAGNOSIS — R19.7 DIARRHEA, UNSPECIFIED TYPE: ICD-10-CM

## 2025-05-22 DIAGNOSIS — R11.0 NAUSEA: ICD-10-CM

## 2025-05-22 DIAGNOSIS — R10.84 GENERALIZED ABDOMINAL PAIN: Primary | ICD-10-CM

## 2025-05-22 DIAGNOSIS — E86.0 DEHYDRATION: ICD-10-CM

## 2025-05-22 LAB
ALBUMIN SERPL BCP-MCNC: 3.8 G/DL (ref 3.2–4.9)
ALBUMIN/GLOB SERPL: 1.5 G/DL
ALP SERPL-CCNC: 57 U/L (ref 30–99)
ALT SERPL-CCNC: 16 U/L (ref 2–50)
ANION GAP SERPL CALC-SCNC: 14 MMOL/L (ref 7–16)
ANISOCYTOSIS BLD QL SMEAR: ABNORMAL
AST SERPL-CCNC: 19 U/L (ref 12–45)
BASOPHILS # BLD AUTO: 0 % (ref 0–1.8)
BASOPHILS # BLD: 0 K/UL (ref 0–0.12)
BILIRUB SERPL-MCNC: 0.6 MG/DL (ref 0.1–1.5)
BUN SERPL-MCNC: 28 MG/DL (ref 8–22)
CALCIUM ALBUM COR SERPL-MCNC: 9.2 MG/DL (ref 8.5–10.5)
CALCIUM SERPL-MCNC: 9 MG/DL (ref 8.5–10.5)
CHLORIDE SERPL-SCNC: 104 MMOL/L (ref 96–112)
CO2 SERPL-SCNC: 18 MMOL/L (ref 20–33)
CREAT SERPL-MCNC: 1.19 MG/DL (ref 0.5–1.4)
EOSINOPHIL # BLD AUTO: 0 K/UL (ref 0–0.51)
EOSINOPHIL NFR BLD: 0 % (ref 0–6.9)
ERYTHROCYTE [DISTWIDTH] IN BLOOD BY AUTOMATED COUNT: 49.1 FL (ref 35.9–50)
GFR SERPLBLD CREATININE-BSD FMLA CKD-EPI: 45 ML/MIN/1.73 M 2
GLOBULIN SER CALC-MCNC: 2.6 G/DL (ref 1.9–3.5)
GLUCOSE SERPL-MCNC: 155 MG/DL (ref 65–99)
HCT VFR BLD AUTO: 43.3 % (ref 37–47)
HGB BLD-MCNC: 14.4 G/DL (ref 12–16)
LYMPHOCYTES # BLD AUTO: 0.14 K/UL (ref 1–4.8)
LYMPHOCYTES NFR BLD: 0.9 % (ref 22–41)
MANUAL DIFF BLD: NORMAL
MCH RBC QN AUTO: 29.3 PG (ref 27–33)
MCHC RBC AUTO-ENTMCNC: 33.3 G/DL (ref 32.2–35.5)
MCV RBC AUTO: 88.2 FL (ref 81.4–97.8)
MICROCYTES BLD QL SMEAR: ABNORMAL
MONOCYTES # BLD AUTO: 0.5 K/UL (ref 0–0.85)
MONOCYTES NFR BLD AUTO: 3.5 % (ref 0–13.4)
MORPHOLOGY BLD-IMP: NORMAL
NEUTROPHILS # BLD AUTO: 14.82 K/UL (ref 1.82–7.42)
NEUTROPHILS NFR BLD: 95.6 % (ref 44–72)
NRBC # BLD AUTO: 0 K/UL
NRBC BLD-RTO: 0 /100 WBC (ref 0–0.2)
OVALOCYTES BLD QL SMEAR: NORMAL
PLATELET # BLD AUTO: 252 K/UL (ref 164–446)
PLATELET BLD QL SMEAR: NORMAL
PMV BLD AUTO: 10.3 FL (ref 9–12.9)
POIKILOCYTOSIS BLD QL SMEAR: NORMAL
POTASSIUM SERPL-SCNC: 3.9 MMOL/L (ref 3.6–5.5)
PROT SERPL-MCNC: 6.4 G/DL (ref 6–8.2)
RBC # BLD AUTO: 4.91 M/UL (ref 4.2–5.4)
RBC BLD AUTO: PRESENT
SODIUM SERPL-SCNC: 136 MMOL/L (ref 135–145)
WBC # BLD AUTO: 15.5 K/UL (ref 4.8–10.8)

## 2025-05-22 PROCEDURE — 85027 COMPLETE CBC AUTOMATED: CPT

## 2025-05-22 PROCEDURE — 700111 HCHG RX REV CODE 636 W/ 250 OVERRIDE (IP): Mod: JZ | Performed by: EMERGENCY MEDICINE

## 2025-05-22 PROCEDURE — 700105 HCHG RX REV CODE 258: Performed by: EMERGENCY MEDICINE

## 2025-05-22 PROCEDURE — 80053 COMPREHEN METABOLIC PANEL: CPT

## 2025-05-22 PROCEDURE — 85007 BL SMEAR W/DIFF WBC COUNT: CPT

## 2025-05-22 PROCEDURE — 36415 COLL VENOUS BLD VENIPUNCTURE: CPT

## 2025-05-22 PROCEDURE — RXMED WILLOW AMBULATORY MEDICATION CHARGE: Performed by: EMERGENCY MEDICINE

## 2025-05-22 PROCEDURE — 99285 EMERGENCY DEPT VISIT HI MDM: CPT

## 2025-05-22 PROCEDURE — 96374 THER/PROPH/DIAG INJ IV PUSH: CPT

## 2025-05-22 RX ORDER — SODIUM CHLORIDE 9 MG/ML
1000 INJECTION, SOLUTION INTRAVENOUS ONCE
Status: COMPLETED | OUTPATIENT
Start: 2025-05-22 | End: 2025-05-22

## 2025-05-22 RX ORDER — ONDANSETRON 2 MG/ML
4 INJECTION INTRAMUSCULAR; INTRAVENOUS ONCE
Status: COMPLETED | OUTPATIENT
Start: 2025-05-22 | End: 2025-05-22

## 2025-05-22 RX ORDER — ONDANSETRON 4 MG/1
4 TABLET, ORALLY DISINTEGRATING ORAL EVERY 6 HOURS PRN
Qty: 10 TABLET | Refills: 0 | Status: SHIPPED | OUTPATIENT
Start: 2025-05-22

## 2025-05-22 RX ADMIN — ONDANSETRON 4 MG: 2 INJECTION INTRAMUSCULAR; INTRAVENOUS at 09:50

## 2025-05-22 RX ADMIN — SODIUM CHLORIDE 1000 ML: 9 INJECTION, SOLUTION INTRAVENOUS at 09:49

## 2025-05-22 ASSESSMENT — FIBROSIS 4 INDEX: FIB4 SCORE: 2.95

## 2025-05-22 ASSESSMENT — LIFESTYLE VARIABLES: DO YOU DRINK ALCOHOL: NO

## 2025-05-22 NOTE — ED NOTES
Pt is a new resident at the [facility]. Report called to the facility and they will pick her up.

## 2025-05-22 NOTE — ED PROVIDER NOTES
ED Provider Note    CHIEF COMPLAINT  Chief Complaint   Patient presents with    Diarrhea     Pts caregiver reports diarrhea started yesterday. Others in household report food poisoning.     Nausea     HPI/ROS    OUTSIDE HISTORIAN(S):  EMS provided signout at the bedside as the patient has Alzheimer's and cannot provide history    Bernie Barrientos is a 84 y.o. female who presents with diarrhea.  According to EMS the patient presents from a household where there is been multiple patients with diarrhea and vomiting.  The patient has Alzheimer's and states that she does not know why she is in the emergency department.  She denies pain.  She did have some active emesis prior to my evaluation.  No other history could be obtained.    PAST MEDICAL HISTORY   has a past medical history of Alzheimer's dementia with agitation (HCC), Anesthesia, Arthritis (2003), Colon polyp, Colon polyps, Dental disorder, DJD (degenerative joint disease), Heart valve disease (2005), Hypothyroidism, Migraine, Postmenopausal hormone replacement therapy, and Sleep apnea.    SURGICAL HISTORY   has a past surgical history that includes tonsillectomy (as child); appendectomy (1957); hand surgery (2004); hand surgery (2009); colonoscopy with polyp (8/15/2013); abdominal hysterectomy total (1984); hip arthroplasty total (Bilateral); and cervical fusion posterior.    FAMILY HISTORY  Family History   Problem Relation Age of Onset    Colorectal Cancer Mother     Diabetes Mother     No Known Problems Father         Passed away before the pt was born     Thyroid Sister     Diabetes Other     Heart Disease Neg Hx     Hypertension Neg Hx     Hyperlipidemia Neg Hx     Breast Cancer Neg Hx     Peritoneal Cancer Neg Hx     Ovarian Cancer Neg Hx     Tubal Cancer Neg Hx        SOCIAL HISTORY  Social History     Tobacco Use    Smoking status: Former     Current packs/day: 0.00     Average packs/day: 1 pack/day for 8.0 years (8.0 ttl pk-yrs)     Types: Cigarettes      "Start date: 1972     Quit date: 1980     Years since quittin.4    Smokeless tobacco: Never   Vaping Use    Vaping status: Never Used   Substance and Sexual Activity    Alcohol use: Yes     Alcohol/week: 1.2 oz     Types: 2 Glasses of wine per week     Comment: occ    Drug use: No    Sexual activity: Not Currently       CURRENT MEDICATIONS  Home Medications       Reviewed by Sharla Sabillon R.N. (Registered Nurse) on 25 at 0909  Med List Status: Not Addressed     Medication Last Dose Status   acetaminophen (TYLENOL) 500 MG Tab  Active   desvenlafaxine succinate (PRISTIQ) 50 MG TABLET SR 24 HR  Active   diclofenac sodium (VOLTAREN) 1 % Gel  Active   levothyroxine (SYNTHROID) 150 MCG Tab  Active   QUEtiapine (SEROQUEL) 25 MG Tab  Active                    ALLERGIES  Allergies[1]    PHYSICAL EXAM  VITAL SIGNS: /58   Pulse 85   Temp 36.8 °C (98.2 °F) (Temporal)   Resp 18   Ht 1.727 m (5' 8\")   Wt 68 kg (150 lb)   SpO2 90%   BMI 22.81 kg/m²    In general the patient appears chronically ill in appearance    HEENT unremarkable    Pulmonary the patient's lungs are clear auscultation bilaterally    Cardiovascular S1-S2 with a regular rate and rhythm    GI the patient's abdomen is soft with no distention.  There is hyperactive bowel sounds    Skin no jaundice nor pallor    Neurologic examination is grossly intact    EKG/LABS  Results for orders placed or performed during the hospital encounter of 25   CBC WITH DIFFERENTIAL    Collection Time: 25  9:20 AM   Result Value Ref Range    WBC 15.5 (H) 4.8 - 10.8 K/uL    RBC 4.91 4.20 - 5.40 M/uL    Hemoglobin 14.4 12.0 - 16.0 g/dL    Hematocrit 43.3 37.0 - 47.0 %    MCV 88.2 81.4 - 97.8 fL    MCH 29.3 27.0 - 33.0 pg    MCHC 33.3 32.2 - 35.5 g/dL    RDW 49.1 35.9 - 50.0 fL    Platelet Count 252 164 - 446 K/uL    MPV 10.3 9.0 - 12.9 fL    Neutrophils-Polys 95.60 (H) 44.00 - 72.00 %    Lymphocytes 0.90 (L) 22.00 - 41.00 %    Monocytes 3.50 " 0.00 - 13.40 %    Eosinophils 0.00 0.00 - 6.90 %    Basophils 0.00 0.00 - 1.80 %    Nucleated RBC 0.00 0.00 - 0.20 /100 WBC    Neutrophils (Absolute) 14.82 (H) 1.82 - 7.42 K/uL    Lymphs (Absolute) 0.14 (L) 1.00 - 4.80 K/uL    Monos (Absolute) 0.50 0.00 - 0.85 K/uL    Eos (Absolute) 0.00 0.00 - 0.51 K/uL    Baso (Absolute) 0.00 0.00 - 0.12 K/uL    NRBC (Absolute) 0.00 K/uL    Anisocytosis 2+ (A)     Microcytosis 2+ (A)    COMP METABOLIC PANEL    Collection Time: 05/22/25  9:20 AM   Result Value Ref Range    Sodium 136 135 - 145 mmol/L    Potassium 3.9 3.6 - 5.5 mmol/L    Chloride 104 96 - 112 mmol/L    Co2 18 (L) 20 - 33 mmol/L    Anion Gap 14.0 7.0 - 16.0    Glucose 155 (H) 65 - 99 mg/dL    Bun 28 (H) 8 - 22 mg/dL    Creatinine 1.19 0.50 - 1.40 mg/dL    Calcium 9.0 8.5 - 10.5 mg/dL    Correct Calcium 9.2 8.5 - 10.5 mg/dL    AST(SGOT) 19 12 - 45 U/L    ALT(SGPT) 16 2 - 50 U/L    Alkaline Phosphatase 57 30 - 99 U/L    Total Bilirubin 0.6 0.1 - 1.5 mg/dL    Albumin 3.8 3.2 - 4.9 g/dL    Total Protein 6.4 6.0 - 8.2 g/dL    Globulin 2.6 1.9 - 3.5 g/dL    A-G Ratio 1.5 g/dL   ESTIMATED GFR    Collection Time: 05/22/25  9:20 AM   Result Value Ref Range    GFR (CKD-EPI) 45 (A) >60 mL/min/1.73 m 2   DIFFERENTIAL MANUAL    Collection Time: 05/22/25  9:20 AM   Result Value Ref Range    Manual Diff Status PERFORMED    PERIPHERAL SMEAR REVIEW    Collection Time: 05/22/25  9:20 AM   Result Value Ref Range    Peripheral Smear Review see below    PLATELET ESTIMATE    Collection Time: 05/22/25  9:20 AM   Result Value Ref Range    Plt Estimation Normal    MORPHOLOGY    Collection Time: 05/22/25  9:20 AM   Result Value Ref Range    RBC Morphology Present     Poikilocytosis 1+     Ovalocytes 1+      *Note: Due to a large number of results and/or encounters for the requested time period, some results have not been displayed. A complete set of results can be found in Results Review.       COURSE & MEDICAL DECISION MAKING    This an  84-year-old female who presents to the emergency department with vomiting and diarrhea.  Her abdomen is benign.  I suspect this is from a viral gastroenteritis.  She does have a slight leukocytosis but on repeat exam her abdomen continues to be nonsurgical.  She is pleasant.  She cannot provide any history due to her dementia.  She did receive a liter of fluid for mild dehydration.  She has had no emesis throughout her stay here in the emergency department.  She will be discharged home on Zofran with instructions to return for persistent vomiting or increased abdominal pain.    FINAL DIAGNOSIS  1.  Abdominal pain  2.  Nausea, vomiting, and diarrhea  3.  Dehydration requiring IV fluids  4.  Dementia    Disposition  The patient will be discharged in stable condition     Electronically signed by: Edinson Buck M.D., 5/22/2025 9:25 AM           [1]   Allergies  Allergen Reactions    Naprosyn [Naproxen] Rash     Rash on body    Pcn [Penicillins]      Reaction when she was a child

## 2025-05-22 NOTE — DISCHARGE PLANNING
TCN following. HTH/SCP chart review completed. Note pt currently in ED 2' to complaints of diarrhea and nausea (as noted others in household reported food poisoning). Patient with noted acute hospitalization 5/8-5/11/2025 in the setting of Colitis. Patient was discharged home with [facility] and O2 ([facility]). Per review, patient was noted to be transitioning to reside at the [facility] ([facility] was unable to initiate start of care). PT was noted to consult patient during acute hospitalization with visit 5/10 revealing mobility supervised with FWW up to 250 feet. Recommendations were noted for HHPT and a FWW. At this time, patient discharge plan is likely developing pending medical course.      If patient does not warrant admission/ inpatient status to [facility] and is unable to functionally discharge home, please reach out to TCN for assist with SCP auth to discharge directly from ED to [facility]. VOALTE sent to ED ATIF Escobar to provide TCN collaboration and support in discharge planning as appropriate. Please reach out to TCN via VOALTE if post acute transitional care needs are warranted for dc planning.        ADDENDUM 12:24PM- VOALTE update received from ED CM advising of patient anticipated discharge home today. [facility] provided update as patient as followed by these services prior to ED visit. Thank you.

## 2025-05-22 NOTE — ED TRIAGE NOTES
"Chief Complaint   Patient presents with    Diarrhea     Pts caregiver reports diarrhea started yesterday. Others in household report food poisoning.     Nausea     /58   Pulse 85   Temp 36.8 °C (98.2 °F) (Temporal)   Resp 18   Ht 1.727 m (5' 8\")   Wt 68 kg (150 lb)   SpO2 90%   BMI 22.81 kg/m²     Pt brought in by LUÍSSA from home to GR 39. AOx2 - baseline. Pt in a gown and on monitor. Pt started dry heaving on arrival. Denies symptoms. Chart flagged for ERP to see.     pta.   "

## 2025-05-23 ENCOUNTER — TELEPHONE (OUTPATIENT)
Dept: HOME HEALTH SERVICES | Facility: HOME HEALTHCARE | Age: 85
End: 2025-05-23
Payer: MEDICARE

## 2025-05-24 ENCOUNTER — TELEPHONE (OUTPATIENT)
Dept: HOME HEALTH SERVICES | Facility: HOME HEALTHCARE | Age: 85
End: 2025-05-24
Payer: MEDICARE

## 2025-05-24 NOTE — TELEPHONE ENCOUNTER
Called and spoke with daughter about scheduling.     Patient scheduled for start of care appointment for 5/26 between 12:30-1:30pm

## 2025-05-24 NOTE — TELEPHONE ENCOUNTER
Spoke to patient and let her know we are in scheduling delay, patient is ok with a call in the next few days for scheduling.

## 2025-05-26 ENCOUNTER — TELEPHONE (OUTPATIENT)
Dept: HOME HEALTH SERVICES | Facility: HOME HEALTHCARE | Age: 85
End: 2025-05-26
Payer: MEDICARE

## 2025-05-26 NOTE — TELEPHONE ENCOUNTER
Per RN supervisor Lucian, unable to be seen today as caregiver will need to be at appointments with daughter Yoselyn for training. Please call 5/27 to schedule for 5/28. Later start of care requested for 5/27 per request.

## 2025-05-27 ENCOUNTER — TELEPHONE (OUTPATIENT)
Dept: HOME HEALTH SERVICES | Facility: HOME HEALTHCARE | Age: 85
End: 2025-05-27
Payer: MEDICARE

## 2025-05-28 ENCOUNTER — TELEPHONE (OUTPATIENT)
Dept: HOME HEALTH SERVICES | Facility: HOME HEALTHCARE | Age: 85
End: 2025-05-28
Payer: MEDICARE

## 2025-05-28 NOTE — TELEPHONE ENCOUNTER
Called daughter Yoselyn and left VM. Later start of care 5/29- no contact.  Spoke to Yoselyn, they are unavailable for tomorrow, requesting a call back tomorrow to schedule for Friday. [facility] updated to 5/29 per request.

## 2025-05-29 ENCOUNTER — TELEPHONE (OUTPATIENT)
Dept: HOME HEALTH SERVICES | Facility: HOME HEALTHCARE | Age: 85
End: 2025-05-29
Payer: MEDICARE

## 2025-05-29 NOTE — TELEPHONE ENCOUNTER
Daughter Yoselny called and understands delay, is ok with a call in a few more days. Offered to send to another agency but she declined. Later start of care for 5/31 per request.

## 2025-05-30 ENCOUNTER — TELEPHONE (OUTPATIENT)
Dept: HOME HEALTH SERVICES | Facility: HOME HEALTHCARE | Age: 85
End: 2025-05-30
Payer: MEDICARE

## 2025-05-31 ENCOUNTER — TELEPHONE (OUTPATIENT)
Dept: HOME HEALTH SERVICES | Facility: HOME HEALTHCARE | Age: 85
End: 2025-05-31
Payer: MEDICARE

## 2025-05-31 NOTE — TELEPHONE ENCOUNTER
Called and spoke with daughter about scheduling home health services.     Patient scheduled for start of care appointment on 6/1 between 9-10am

## 2025-06-01 ENCOUNTER — HOME CARE VISIT (OUTPATIENT)
Dept: HOME HEALTH SERVICES | Facility: HOME HEALTHCARE | Age: 85
End: 2025-06-01
Payer: MEDICARE

## 2025-06-01 VITALS
SYSTOLIC BLOOD PRESSURE: 140 MMHG | RESPIRATION RATE: 16 BRPM | DIASTOLIC BLOOD PRESSURE: 64 MMHG | HEART RATE: 71 BPM | OXYGEN SATURATION: 92 % | TEMPERATURE: 98.8 F

## 2025-06-01 PROCEDURE — 665005 NO-PAY RAP - HOME HEALTH

## 2025-06-01 PROCEDURE — 665998 HH PPS REVENUE CREDIT

## 2025-06-01 PROCEDURE — G0495 RN CARE TRAIN/EDU IN HH: HCPCS

## 2025-06-01 PROCEDURE — 665999 HH PPS REVENUE DEBIT

## 2025-06-01 PROCEDURE — 665001 SOC-HOME HEALTH

## 2025-06-01 ASSESSMENT — ENCOUNTER SYMPTOMS
HYPERTENSION: 1
DIFFICULTY THINKING: 1
NAUSEA: DENIES
FATIGUE: 1
DESCRIPTION OF MEMORY LOSS: IMMEDIATE
DRY SKIN: 1
DESCRIPTION OF MEMORY LOSS: SHORT TERM
DENIES PAIN: 1
BOWEL PATTERN NORMAL: 1
SHORTNESS OF BREATH: 1
VOMITING: DENIES
LAST BOWEL MOVEMENT: 67357
STOOL FREQUENCY: DAILY
POOR JUDGMENT: 1

## 2025-06-01 ASSESSMENT — ACTIVITIES OF DAILY LIVING (ADL)
TRANSPORTATION COMMENTS: PATIENT NEEDS ASSISTANCE TO LEAVE THE HOME
OASIS_M1830: 03

## 2025-06-02 ENCOUNTER — HOME CARE VISIT (OUTPATIENT)
Dept: HOME HEALTH SERVICES | Facility: HOME HEALTHCARE | Age: 85
End: 2025-06-02
Payer: MEDICARE

## 2025-06-02 ENCOUNTER — DOCUMENTATION (OUTPATIENT)
Dept: MEDICAL GROUP | Facility: PHYSICIAN GROUP | Age: 85
End: 2025-06-02
Payer: MEDICARE

## 2025-06-02 PROCEDURE — 665998 HH PPS REVENUE CREDIT

## 2025-06-02 PROCEDURE — 665999 HH PPS REVENUE DEBIT

## 2025-06-02 NOTE — PROGRESS NOTES
Medication chart review for [facility] services    Received referral from [facility].   Medications reviewed  compared with discharge summary if available.  Discharge summary date, if applicable:   5/22/2025    Current medication list per [facility]     Medication list one, patient is currently taking    Current Outpatient Medications:     ondansetron, 4 mg, Oral, Q6HRS PRN    acetaminophen, 500-1,000 mg, Oral, Q6HRS PRN    desvenlafaxine succinate, 50 mg, Oral, DAILY    QUEtiapine, 25 mg, Oral, BID    diclofenac sodium, 4 g, Topical, 4X/DAY PRN    levothyroxine, 150 mcg, Oral, AM ES      Medication list two, drugs that the patient has been prescribed or recommended to take by their healthcare provider on discharge summary  Not applicable    Allergies[1]    Labs     Lab Results   Component Value Date/Time    SODIUM 136 05/22/2025 09:20 AM    POTASSIUM 3.9 05/22/2025 09:20 AM    CHLORIDE 104 05/22/2025 09:20 AM    CO2 18 (L) 05/22/2025 09:20 AM    GLUCOSE 155 (H) 05/22/2025 09:20 AM    BUN 28 (H) 05/22/2025 09:20 AM    CREATININE 1.19 05/22/2025 09:20 AM    CREATININE 1.2 02/26/2008 02:48 PM     Lab Results   Component Value Date/Time    ALKPHOSPHAT 57 05/22/2025 09:20 AM    ASTSGOT 19 05/22/2025 09:20 AM    ALTSGPT 16 05/22/2025 09:20 AM    TBILIRUBIN 0.6 05/22/2025 09:20 AM    INR 1.09 11/13/2017 11:48 AM    ALBUMIN 3.8 05/22/2025 09:20 AM        Assessment for clinically significant drug interactions, drug omissions/additions, duplicative therapies.            CC   No primary care provider on file.  No primary provider on file.  Fax: None    [facility]  Phone [phone] fax [phone]    This note was created using voice recognition software (Dragon). The accuracy of the dictation is limited by the abilities of the software. I have reviewed the note prior to signing, however some errors in grammar and context are still possible. If you have any questions related to  this note please do not hesitate to contact our office.       No follow-ups on file.       [1]   Allergies  Allergen Reactions    Naprosyn [Naproxen] Rash     Rash on body    Pcn [Penicillins]      Reaction when she was a child

## 2025-06-03 ENCOUNTER — HOME CARE VISIT (OUTPATIENT)
Dept: HOME HEALTH SERVICES | Facility: HOME HEALTHCARE | Age: 85
End: 2025-06-03
Payer: MEDICARE

## 2025-06-03 VITALS
HEART RATE: 76 BPM | SYSTOLIC BLOOD PRESSURE: 132 MMHG | OXYGEN SATURATION: 4 % | RESPIRATION RATE: 16 BRPM | DIASTOLIC BLOOD PRESSURE: 66 MMHG | TEMPERATURE: 98.5 F

## 2025-06-03 PROCEDURE — 665998 HH PPS REVENUE CREDIT

## 2025-06-03 PROCEDURE — G0151 HHCP-SERV OF PT,EA 15 MIN: HCPCS

## 2025-06-03 PROCEDURE — 665999 HH PPS REVENUE DEBIT

## 2025-06-03 PROCEDURE — G0153 HHCP-SVS OF S/L PATH,EA 15MN: HCPCS

## 2025-06-03 ASSESSMENT — ENCOUNTER SYMPTOMS
AGGRESSION WITHIN DEFINED LIMITS: 1
DEPRESSED MOOD: 1
DENIES PAIN: 1
DIFFICULTY THINKING: 1
ANGER WITHIN DEFINED LIMITS: 1
POOR JUDGMENT: 1

## 2025-06-04 ENCOUNTER — HOME CARE VISIT (OUTPATIENT)
Dept: HOME HEALTH SERVICES | Facility: HOME HEALTHCARE | Age: 85
End: 2025-06-04
Payer: MEDICARE

## 2025-06-04 PROCEDURE — 665998 HH PPS REVENUE CREDIT

## 2025-06-04 PROCEDURE — 665999 HH PPS REVENUE DEBIT

## 2025-06-04 SDOH — ECONOMIC STABILITY: HOUSING INSECURITY: HOME SAFETY: PATIENT LIVES IN AN ALF THAT PROVIDES 24/7 SUPERVISION.

## 2025-06-04 ASSESSMENT — ENCOUNTER SYMPTOMS
DENIES PAIN: 1
DIFFICULTY THINKING: 1

## 2025-06-04 ASSESSMENT — ACTIVITIES OF DAILY LIVING (ADL)
AMBULATION_DISTANCE/DURATION_TOLERATED: >500 FEET
AMBULATION ASSISTANCE ON FLAT SURFACES: 1

## 2025-06-05 PROCEDURE — 665998 HH PPS REVENUE CREDIT

## 2025-06-05 PROCEDURE — 665999 HH PPS REVENUE DEBIT

## 2025-06-06 PROCEDURE — 665998 HH PPS REVENUE CREDIT

## 2025-06-06 PROCEDURE — 665999 HH PPS REVENUE DEBIT

## 2025-06-07 PROCEDURE — 665998 HH PPS REVENUE CREDIT

## 2025-06-07 PROCEDURE — 665999 HH PPS REVENUE DEBIT

## 2025-06-08 PROCEDURE — 665999 HH PPS REVENUE DEBIT

## 2025-06-08 PROCEDURE — 665998 HH PPS REVENUE CREDIT

## 2025-06-09 PROCEDURE — 665999 HH PPS REVENUE DEBIT

## 2025-06-09 PROCEDURE — 665998 HH PPS REVENUE CREDIT

## 2025-06-10 ENCOUNTER — HOME CARE VISIT (OUTPATIENT)
Dept: HOME HEALTH SERVICES | Facility: HOME HEALTHCARE | Age: 85
End: 2025-06-10
Payer: MEDICARE

## 2025-06-10 PROCEDURE — 665998 HH PPS REVENUE CREDIT

## 2025-06-10 PROCEDURE — 665999 HH PPS REVENUE DEBIT

## 2025-06-10 ASSESSMENT — ACTIVITIES OF DAILY LIVING (ADL)
OASIS_M1830: 03
HOME_HEALTH_OASIS: 01

## 2025-06-10 ASSESSMENT — ENCOUNTER SYMPTOMS
DIFFICULTY THINKING: 1
DENIES PAIN: 1

## 2025-06-11 PROCEDURE — 665998 HH PPS REVENUE CREDIT

## 2025-06-11 PROCEDURE — 665999 HH PPS REVENUE DEBIT

## 2025-06-12 PROCEDURE — 665999 HH PPS REVENUE DEBIT

## 2025-06-12 PROCEDURE — 665998 HH PPS REVENUE CREDIT

## 2025-06-13 PROCEDURE — 665999 HH PPS REVENUE DEBIT

## 2025-06-13 PROCEDURE — 665998 HH PPS REVENUE CREDIT

## 2025-06-14 PROCEDURE — 665999 HH PPS REVENUE DEBIT

## 2025-06-14 PROCEDURE — 665998 HH PPS REVENUE CREDIT

## 2025-06-15 PROCEDURE — 665999 HH PPS REVENUE DEBIT

## 2025-06-15 PROCEDURE — 665998 HH PPS REVENUE CREDIT

## 2025-06-16 PROCEDURE — 665998 HH PPS REVENUE CREDIT

## 2025-06-16 PROCEDURE — 665999 HH PPS REVENUE DEBIT

## 2025-06-17 PROCEDURE — 665999 HH PPS REVENUE DEBIT

## 2025-06-17 PROCEDURE — 665998 HH PPS REVENUE CREDIT

## 2025-06-18 ENCOUNTER — HOME CARE VISIT (OUTPATIENT)
Dept: HOME HEALTH SERVICES | Facility: HOME HEALTHCARE | Age: 85
End: 2025-06-18
Payer: MEDICARE

## 2025-06-18 PROCEDURE — 665998 HH PPS REVENUE CREDIT

## 2025-06-18 PROCEDURE — 665999 HH PPS REVENUE DEBIT

## 2025-06-19 PROCEDURE — 665998 HH PPS REVENUE CREDIT

## 2025-06-19 PROCEDURE — 665999 HH PPS REVENUE DEBIT

## 2025-06-21 ENCOUNTER — APPOINTMENT (OUTPATIENT)
Dept: RADIOLOGY | Facility: MEDICAL CENTER | Age: 85
DRG: 312 | End: 2025-06-21
Attending: EMERGENCY MEDICINE
Payer: MEDICARE

## 2025-06-21 ENCOUNTER — HOSPITAL ENCOUNTER (INPATIENT)
Facility: MEDICAL CENTER | Age: 85
LOS: 3 days | DRG: 312 | End: 2025-06-24
Attending: EMERGENCY MEDICINE | Admitting: HOSPITALIST
Payer: MEDICARE

## 2025-06-21 ENCOUNTER — APPOINTMENT (OUTPATIENT)
Dept: RADIOLOGY | Facility: MEDICAL CENTER | Age: 85
DRG: 312 | End: 2025-06-21
Attending: HOSPITALIST
Payer: MEDICARE

## 2025-06-21 DIAGNOSIS — I10 PRIMARY HYPERTENSION: ICD-10-CM

## 2025-06-21 DIAGNOSIS — S52.022A CLOSED FRACTURE OF OLECRANON PROCESS OF LEFT ULNA, INITIAL ENCOUNTER: ICD-10-CM

## 2025-06-21 DIAGNOSIS — S09.90XA CLOSED HEAD INJURY, INITIAL ENCOUNTER: ICD-10-CM

## 2025-06-21 DIAGNOSIS — R55 SYNCOPE, UNSPECIFIED SYNCOPE TYPE: Primary | ICD-10-CM

## 2025-06-21 PROBLEM — R93.89 ABNORMAL CHEST X-RAY: Status: ACTIVE | Noted: 2025-06-21

## 2025-06-21 LAB
ALBUMIN SERPL BCP-MCNC: 3.4 G/DL (ref 3.2–4.9)
ALBUMIN/GLOB SERPL: 1.7 G/DL
ALP SERPL-CCNC: 53 U/L (ref 30–99)
ALT SERPL-CCNC: 10 U/L (ref 2–50)
ANION GAP SERPL CALC-SCNC: 10 MMOL/L (ref 7–16)
APTT PPP: 24.6 SEC (ref 24.7–36)
AST SERPL-CCNC: 17 U/L (ref 12–45)
BASOPHILS # BLD AUTO: 0.5 % (ref 0–1.8)
BASOPHILS # BLD: 0.02 K/UL (ref 0–0.12)
BILIRUB SERPL-MCNC: 0.4 MG/DL (ref 0.1–1.5)
BUN SERPL-MCNC: 18 MG/DL (ref 8–22)
CALCIUM ALBUM COR SERPL-MCNC: 8.9 MG/DL (ref 8.5–10.5)
CALCIUM SERPL-MCNC: 8.4 MG/DL (ref 8.5–10.5)
CHLORIDE SERPL-SCNC: 105 MMOL/L (ref 96–112)
CO2 SERPL-SCNC: 22 MMOL/L (ref 20–33)
CREAT SERPL-MCNC: 1.27 MG/DL (ref 0.5–1.4)
EKG IMPRESSION: NORMAL
EOSINOPHIL # BLD AUTO: 0.08 K/UL (ref 0–0.51)
EOSINOPHIL NFR BLD: 1.9 % (ref 0–6.9)
ERYTHROCYTE [DISTWIDTH] IN BLOOD BY AUTOMATED COUNT: 48.1 FL (ref 35.9–50)
GFR SERPLBLD CREATININE-BSD FMLA CKD-EPI: 41 ML/MIN/1.73 M 2
GLOBULIN SER CALC-MCNC: 2 G/DL (ref 1.9–3.5)
GLUCOSE SERPL-MCNC: 136 MG/DL (ref 65–99)
HCT VFR BLD AUTO: 39.8 % (ref 37–47)
HGB BLD-MCNC: 13 G/DL (ref 12–16)
IMM GRANULOCYTES # BLD AUTO: 0.03 K/UL (ref 0–0.11)
IMM GRANULOCYTES NFR BLD AUTO: 0.7 % (ref 0–0.9)
INR PPP: 1.17 (ref 0.87–1.13)
LYMPHOCYTES # BLD AUTO: 0.77 K/UL (ref 1–4.8)
LYMPHOCYTES NFR BLD: 18.2 % (ref 22–41)
MCH RBC QN AUTO: 29.6 PG (ref 27–33)
MCHC RBC AUTO-ENTMCNC: 32.7 G/DL (ref 32.2–35.5)
MCV RBC AUTO: 90.7 FL (ref 81.4–97.8)
MONOCYTES # BLD AUTO: 0.27 K/UL (ref 0–0.85)
MONOCYTES NFR BLD AUTO: 6.4 % (ref 0–13.4)
NEUTROPHILS # BLD AUTO: 3.05 K/UL (ref 1.82–7.42)
NEUTROPHILS NFR BLD: 72.3 % (ref 44–72)
NRBC # BLD AUTO: 0 K/UL
NRBC BLD-RTO: 0 /100 WBC (ref 0–0.2)
PLATELET # BLD AUTO: 211 K/UL (ref 164–446)
PMV BLD AUTO: 10 FL (ref 9–12.9)
POTASSIUM SERPL-SCNC: 4.6 MMOL/L (ref 3.6–5.5)
PROT SERPL-MCNC: 5.4 G/DL (ref 6–8.2)
PROTHROMBIN TIME: 14.9 SEC (ref 12–14.6)
RBC # BLD AUTO: 4.39 M/UL (ref 4.2–5.4)
SODIUM SERPL-SCNC: 137 MMOL/L (ref 135–145)
T4 FREE SERPL-MCNC: 1.31 NG/DL (ref 0.93–1.7)
TROPONIN T SERPL-MCNC: 11 NG/L (ref 6–19)
TSH SERPL-ACNC: 1.66 UIU/ML (ref 0.38–5.33)
WBC # BLD AUTO: 4.2 K/UL (ref 4.8–10.8)

## 2025-06-21 PROCEDURE — 84484 ASSAY OF TROPONIN QUANT: CPT

## 2025-06-21 PROCEDURE — 99223 1ST HOSP IP/OBS HIGH 75: CPT | Mod: AI | Performed by: HOSPITALIST

## 2025-06-21 PROCEDURE — 85610 PROTHROMBIN TIME: CPT

## 2025-06-21 PROCEDURE — 84443 ASSAY THYROID STIM HORMONE: CPT

## 2025-06-21 PROCEDURE — 85730 THROMBOPLASTIN TIME PARTIAL: CPT

## 2025-06-21 PROCEDURE — 71045 X-RAY EXAM CHEST 1 VIEW: CPT

## 2025-06-21 PROCEDURE — 71260 CT THORAX DX C+: CPT

## 2025-06-21 PROCEDURE — 85025 COMPLETE CBC W/AUTO DIFF WBC: CPT

## 2025-06-21 PROCEDURE — 84439 ASSAY OF FREE THYROXINE: CPT

## 2025-06-21 PROCEDURE — 73080 X-RAY EXAM OF ELBOW: CPT | Mod: LT

## 2025-06-21 PROCEDURE — 700117 HCHG RX CONTRAST REV CODE 255: Performed by: EMERGENCY MEDICINE

## 2025-06-21 PROCEDURE — 70498 CT ANGIOGRAPHY NECK: CPT

## 2025-06-21 PROCEDURE — 29105 APPLICATION LONG ARM SPLINT: CPT

## 2025-06-21 PROCEDURE — 700117 HCHG RX CONTRAST REV CODE 255: Performed by: HOSPITALIST

## 2025-06-21 PROCEDURE — 72125 CT NECK SPINE W/O DYE: CPT

## 2025-06-21 PROCEDURE — 99285 EMERGENCY DEPT VISIT HI MDM: CPT

## 2025-06-21 PROCEDURE — 70496 CT ANGIOGRAPHY HEAD: CPT

## 2025-06-21 PROCEDURE — 97602 WOUND(S) CARE NON-SELECTIVE: CPT

## 2025-06-21 PROCEDURE — 93005 ELECTROCARDIOGRAM TRACING: CPT | Mod: TC | Performed by: EMERGENCY MEDICINE

## 2025-06-21 PROCEDURE — 770020 HCHG ROOM/CARE - TELE (206)

## 2025-06-21 PROCEDURE — 700102 HCHG RX REV CODE 250 W/ 637 OVERRIDE(OP): Performed by: HOSPITALIST

## 2025-06-21 PROCEDURE — 302875 HCHG BANDAGE ACE 4 OR 6""

## 2025-06-21 PROCEDURE — 94760 N-INVAS EAR/PLS OXIMETRY 1: CPT

## 2025-06-21 PROCEDURE — A9270 NON-COVERED ITEM OR SERVICE: HCPCS | Performed by: HOSPITALIST

## 2025-06-21 PROCEDURE — 36415 COLL VENOUS BLD VENIPUNCTURE: CPT

## 2025-06-21 PROCEDURE — 80053 COMPREHEN METABOLIC PANEL: CPT

## 2025-06-21 RX ORDER — ONDANSETRON 4 MG/1
4 TABLET, ORALLY DISINTEGRATING ORAL EVERY 4 HOURS PRN
Status: DISCONTINUED | OUTPATIENT
Start: 2025-06-21 | End: 2025-06-24 | Stop reason: HOSPADM

## 2025-06-21 RX ORDER — ACETAMINOPHEN 500 MG
500-1000 TABLET ORAL EVERY 6 HOURS PRN
Status: DISCONTINUED | OUTPATIENT
Start: 2025-06-21 | End: 2025-06-24 | Stop reason: HOSPADM

## 2025-06-21 RX ORDER — LEVOTHYROXINE SODIUM 75 UG/1
150 TABLET ORAL
Status: DISCONTINUED | OUTPATIENT
Start: 2025-06-21 | End: 2025-06-24 | Stop reason: HOSPADM

## 2025-06-21 RX ORDER — ONDANSETRON 2 MG/ML
4 INJECTION INTRAMUSCULAR; INTRAVENOUS EVERY 4 HOURS PRN
Status: DISCONTINUED | OUTPATIENT
Start: 2025-06-21 | End: 2025-06-24 | Stop reason: HOSPADM

## 2025-06-21 RX ORDER — DESVENLAFAXINE 50 MG/1
50 TABLET, FILM COATED, EXTENDED RELEASE ORAL DAILY
Status: DISCONTINUED | OUTPATIENT
Start: 2025-06-21 | End: 2025-06-21

## 2025-06-21 RX ORDER — VENLAFAXINE 75 MG/1
37.5 TABLET ORAL 2 TIMES DAILY WITH MEALS
Status: DISCONTINUED | OUTPATIENT
Start: 2025-06-22 | End: 2025-06-24 | Stop reason: HOSPADM

## 2025-06-21 RX ORDER — QUETIAPINE FUMARATE 25 MG/1
25 TABLET, FILM COATED ORAL 2 TIMES DAILY
Status: DISCONTINUED | OUTPATIENT
Start: 2025-06-21 | End: 2025-06-21

## 2025-06-21 RX ADMIN — LEVOTHYROXINE SODIUM 150 MCG: 0.07 TABLET ORAL at 13:19

## 2025-06-21 RX ADMIN — ACETAMINOPHEN 1000 MG: 500 TABLET ORAL at 17:59

## 2025-06-21 RX ADMIN — RIVAROXABAN 10 MG: 10 TABLET, FILM COATED ORAL at 17:59

## 2025-06-21 RX ADMIN — IOHEXOL 80 ML: 350 INJECTION, SOLUTION INTRAVENOUS at 10:15

## 2025-06-21 RX ADMIN — IOHEXOL 75 ML: 350 INJECTION, SOLUTION INTRAVENOUS at 16:08

## 2025-06-21 RX ADMIN — THERA TABS 1 TABLET: TAB at 13:19

## 2025-06-21 ASSESSMENT — ENCOUNTER SYMPTOMS
LOSS OF CONSCIOUSNESS: 0
PALPITATIONS: 0
HEADACHES: 0
FEVER: 0
DIZZINESS: 0
SHORTNESS OF BREATH: 0
ABDOMINAL PAIN: 0
CHILLS: 0
VOMITING: 0
DIARRHEA: 0
COUGH: 0
NAUSEA: 0
BACK PAIN: 0

## 2025-06-21 ASSESSMENT — PATIENT HEALTH QUESTIONNAIRE - PHQ9
1. LITTLE INTEREST OR PLEASURE IN DOING THINGS: NOT AT ALL
SUM OF ALL RESPONSES TO PHQ9 QUESTIONS 1 AND 2: 0
2. FEELING DOWN, DEPRESSED, IRRITABLE, OR HOPELESS: NOT AT ALL

## 2025-06-21 ASSESSMENT — COPD QUESTIONNAIRES
HAVE YOU SMOKED AT LEAST 100 CIGARETTES IN YOUR ENTIRE LIFE: YES
COPD SCREENING SCORE: 4
DO YOU EVER COUGH UP ANY MUCUS OR PHLEGM?: NO/ONLY WITH OCCASIONAL COLDS OR INFECTIONS
DURING THE PAST 4 WEEKS HOW MUCH DID YOU FEEL SHORT OF BREATH: NONE/LITTLE OF THE TIME

## 2025-06-21 ASSESSMENT — COGNITIVE AND FUNCTIONAL STATUS - GENERAL
DRESSING REGULAR UPPER BODY CLOTHING: A LITTLE
SUGGESTED CMS G CODE MODIFIER MOBILITY: CJ
WALKING IN HOSPITAL ROOM: A LITTLE
DRESSING REGULAR LOWER BODY CLOTHING: A LITTLE
HELP NEEDED FOR BATHING: A LITTLE
MOBILITY SCORE: 22
CLIMB 3 TO 5 STEPS WITH RAILING: A LITTLE
SUGGESTED CMS G CODE MODIFIER DAILY ACTIVITY: CJ
DAILY ACTIVITIY SCORE: 21

## 2025-06-21 ASSESSMENT — FIBROSIS 4 INDEX
FIB4 SCORE: 2.17
FIB4 SCORE: 1.91

## 2025-06-21 ASSESSMENT — PAIN DESCRIPTION - PAIN TYPE
TYPE: ACUTE PAIN

## 2025-06-21 NOTE — ASSESSMENT & PLAN NOTE
Patient was seen twice last month with diarrhea, diagnosed with colitis and treated supportively  Unknown if she is having ongoing diarrhea now as she is unable to tell me and family is uncertain  We will monitor her, if she should develop diarrhea while in house then we will need to workup further    No diarrhea thus far per nursing

## 2025-06-21 NOTE — PROGRESS NOTES
4 Eyes Skin Assessment Completed by FUNMILAYO Vivas and FUNMILAYO Gray.    Skin assessment is primarily focused on high risk bony prominences. Pay special attention to skin beneath and around medical devices, high risk bony prominences, skin to skin areas and areas where the patient lacks sensation to feel pain and areas where the patient previously had breakdown.     Head (Occipital):  WDL   Ears (Under Medical Devices): WDL   Nose (Under Medical Devices): WDL   Mouth:  WDL   Neck: WDL   Breast/Chest:  Scab, Pink, and Mole   Shoulder Blades:  WDL   Spine:   WDL   (R) Arm/Elbow/Hand: Bruising   (L) Arm/Elbow/Hand: JOHANNY splint in place   Abdomen: WDL   Pannus/Groin:  WDL   Sacrum/Coccyx:   Pink, Blanching, and Bruising     (R) Ischial Tuberosity (Sit Bones):  Bruising   (L) Ischial Tuberosity (Sit Bones):  WDL   (R) Leg:  Discoloration   (L) Leg:  Discoloration   (R) Heel:  Red and Blanching   (R) Foot/Toe: Red and Blanching   (L) Heel: Red and Blanching   (L) Foot/Toe:  Red and Blanching       DEVICES IN USE:   Respiratory Devices:  NA, patient on room air  Feeding Devices:  N/A   Lines & BP Monitoring Devices:  Peripheral IV, BP cuff, and Pulse ox    Orthopedic Devices:  Splint  Miscellaneous Devices:  Telemetry monitor    PROTOCOL INTERVENTIONS:   Standard/Trauma Bed:  Already in place    WOUND PHOTOS:   Completed and in EPIC     WOUND CONSULT:   Consult ordered for the following areas Buttock

## 2025-06-21 NOTE — CARE PLAN
The patient is Stable - Low risk of patient condition declining or worsening         Progress made toward(s) clinical / shift goals:    Problem: Pain - Standard  Goal: Alleviation of pain or a reduction in pain to the patient’s comfort goal  Outcome: Progressing     Problem: Knowledge Deficit - Standard  Goal: Patient and family/care givers will demonstrate understanding of plan of care, disease process/condition, diagnostic tests and medications  Outcome: Progressing     Problem: Fall Risk  Goal: Patient will remain free from falls  Outcome: Progressing     Problem: Hemodynamics  Goal: Patient's hemodynamics, fluid balance and neurologic status will be stable or improve  Outcome: Progressing     Problem: Respiratory  Goal: Patient will achieve/maintain optimum respiratory ventilation and gas exchange  Outcome: Progressing     Problem: Mobility  Goal: Patient's capacity to carry out activities will improve  Outcome: Progressing     Problem: Self Care  Goal: Patient will have the ability to perform ADLs independently or with assistance (bathe, groom, dress, toilet and feed)  Outcome: Progressing     Problem: Wound/ / Incision Healing  Goal: Patient's wound/surgical incision will decrease in size and heals properly  Outcome: Progressing       Patient is not progressing towards the following goals:

## 2025-06-21 NOTE — ED NOTES
Medication Reconciliation    Medication reconciliation is complete per patient reporting and home pharmacy.  Allergies reviewed.  No outpatient antibiotics in the last 30 days.  No anticoagulants in the last 14 days.  Patient's home pharmacy is [address] [phone].  Dispense history available in EPIC? Yes    Patient is not taking quetiapine anymore, due to running out of medication at home.    Eloina Conte, Pharmacy Intern

## 2025-06-21 NOTE — PROGRESS NOTES
Spoke with daughter Yoselyn to obtain consent for contrast. Verbal received from Yoselyn. 2 RN signed off done with Marina MELLO.

## 2025-06-21 NOTE — ED PROVIDER NOTES
ED Provider Note    CHIEF COMPLAINT  Chief Complaint   Patient presents with    Syncope    Elbow Pain     Left side       EXTERNAL RECORDS REVIEWED  Inpatient Notes the patient was admitted and I reviewed the discharge summary from May 11, 2025 when she was diagnosed with colitis and treated with antibiotics    HPI/ROS  LIMITATION TO HISTORY   Select: Alzheimer's  OUTSIDE HISTORIAN(S):  EMS    Bernie Barrientos is a 84 y.o. female who presents with past medical history significant for Alzheimer's dementia, she is brought in by paramedics after having 2 episodes of syncope, today she was sitting in a chair felt dizzy and fell injuring her left elbow.  She also hit her head.  The patient currently denies any focal neurologic deficits.  She is a poor historian.    PAST MEDICAL HISTORY   has a past medical history of Alzheimer's dementia with agitation (HCC), Anesthesia, Arthritis (2003), Colon polyp, Colon polyps, Dental disorder, DJD (degenerative joint disease), Heart valve disease (2005), Hypothyroidism, Migraine, Postmenopausal hormone replacement therapy, and Sleep apnea.    SURGICAL HISTORY   has a past surgical history that includes tonsillectomy (as child); appendectomy (1957); hand surgery (2004); hand surgery (2009); colonoscopy with polyp (8/15/2013); abdominal hysterectomy total (1984); hip arthroplasty total (Bilateral); and cervical fusion posterior.    FAMILY HISTORY  Family History   Problem Relation Age of Onset    Colorectal Cancer Mother     Diabetes Mother     No Known Problems Father         Passed away before the pt was born     Thyroid Sister     Diabetes Other     Heart Disease Neg Hx     Hypertension Neg Hx     Hyperlipidemia Neg Hx     Breast Cancer Neg Hx     Peritoneal Cancer Neg Hx     Ovarian Cancer Neg Hx     Tubal Cancer Neg Hx        SOCIAL HISTORY  Social History     Tobacco Use    Smoking status: Former     Current packs/day: 0.00     Average packs/day: 1 pack/day for 8.0 years (8.0 ttl  "pk-yrs)     Types: Cigarettes     Start date: 1972     Quit date: 1980     Years since quittin.5    Smokeless tobacco: Never   Vaping Use    Vaping status: Never Used   Substance and Sexual Activity    Alcohol use: Yes     Alcohol/week: 1.2 oz     Types: 2 Glasses of wine per week     Comment: occ    Drug use: No    Sexual activity: Not Currently       CURRENT MEDICATIONS  Home Medications       Reviewed by Eloina Conte, Pharmacy Intern (Pharmacy Intern) on 25 at 1141  Med List Status: Complete     Medication Last Dose Status   acetaminophen (TYLENOL) 500 MG Tab 2025 Active   desvenlafaxine succinate (PRISTIQ) 50 MG TABLET SR 24 HR 2025 Active   diclofenac sodium (VOLTAREN) 1 % Gel Not Taking Active   levothyroxine (SYNTHROID) 150 MCG Tab 2025 Active   ondansetron (ZOFRAN ODT) 4 MG TABLET DISPERSIBLE Not Taking Active   QUEtiapine (SEROQUEL) 25 MG Tab Not Taking Active                           ALLERGIES  Allergies[1]    PHYSICAL EXAM  VITAL SIGNS: BP (!) 159/65   Pulse 60   Temp 36.5 °C (97.7 °F) (Temporal)   Resp 16   Ht 1.778 m (5' 10\")   Wt 68 kg (150 lb)   SpO2 95%   BMI 21.52 kg/m²      Constitutional: Alert.  HENT: No signs of trauma, Bilateral external ears normal, Nose normal.   Eyes: Pupils are equal and reactive, Conjunctiva normal, Non-icteric.   Neck: Normal range of motion, No tenderness, Supple, No stridor.   Lymphatic: No lymphadenopathy noted.   Cardiovascular: Regular rate and rhythm, no murmurs.   Thorax & Lungs: Normal breath sounds, No respiratory distress, No wheezing, No chest tenderness.   Abdomen: Bowel sounds normal, Soft, No tenderness, No peritoneal signs, No masses, No pulsatile masses.   Skin: Warm, Dry, No erythema, No rash.   Back: No bony tenderness, No CVA tenderness.   Extremities: Intact distal pulses, limited range of motion of left elbow secondary to trauma.  Musculoskeletal: Soft tissue swelling of the left elbow with no open " wound.  Neurologic: Alert , Normal motor function, Normal sensory function, No focal deficits noted.   Psychiatric: Affect normal, Judgment normal, Mood normal.       EKG/LABS    Sinus bradycardia rate 56  Prolonged NC interval 222 ms  axis LEFT AXIS DEVIATION -63 degrees  Intervals normal  Abnormal R-wave progression, early transition  Left ventricular hypertrophy  Anterior Q waves, possibly due to LVH  Compared to ECG 05/08/2025 02:28:55  Nonspecific changes  My impression of this EKG, nonspecific changes, sinus bradycardia, does not meet STEMI criteria at this time  I have independently interpreted this EKG    RADIOLOGY/PROCEDURES   I have independently interpreted the diagnostic imaging associated with this visit and am waiting the final reading from the radiologist.   My preliminary interpretation is as follows: Left elbow x-ray consistent with olecranon fracture    Radiologist interpretation:  DX-CHEST-PORTABLE (1 VIEW)   Final Result      Patchy bilateral infiltrates.      DX-ELBOW-COMPLETE 3+ LEFT   Final Result      Mildly displaced olecranon fracture.      CT-CTA NECK WITH & W/O-POST PROCESSING   Final Result         1. Mild carotid bifurcation calcification. No carotid or vertebral artery stenosis or occlusion in the neck.      2. C-spine fusion hardware.                           CT-CTA HEAD WITH & W/O-POST PROCESS   Final Result         1. CT weighted CT head without contrast does not show any acute process.      2. CTA does not show any aneurysm, large vessel occlusion or venous thrombosis.                  CT-CSPINE WITHOUT PLUS RECONS   Final Result         1. No cervical spine fracture.      2. Extensive fusion hardware. Prominent degenerative changes.      3. Lung apex emphysema.                      COURSE & MEDICAL DECISION MAKING    ASSESSMENT, COURSE AND PLAN  Care Narrative:     Patient presents with syncopal episode.  EKG was ordered, CTA head and neck and CT brain was ordered, CT C-spine was  ordered.  Labs were ordered.    11:13 AM patient's white blood count is low 4.2.  H&H normal 13 and 39.  Patient has left shift with 72% neutrophils.  INR slightly elevated 1.17.  Free T4 is normal.  First troponin is normal.  CMP is unremarkable.  CT scan of the neck and head are negative for acute disease.    I paged Ortho.  I spoke with the [facility] hospitalist to assess the patient for hospitalization.    I spoke with Dr. Encarnacion who will consult on the patient, he does not believe the fracture will be surgical.  He is requested a posterior splint which I have ordered.          ADDITIONAL PROBLEMS MANAGED  Syncope, left elbow fracture    DISPOSITION AND DISCUSSIONS  I have discussed management of the patient with the following physicians and DAYA's: I spoke with the orthopedic surgeon on-call and the hospitalist.    Discussion of management with other [facility] or appropriate source(s):   Patient came in as a TBI, I discussed with the CT tech, the patient went straight to CT scan    Escalation of care considered, and ultimately not performed: Patient's elbow x-ray does not indicate that a need to reduce a fracture or dislocation    Barriers to care at this time, including but not limited to: None.     Decision tools and prescription drugs considered including, but not limited to: Patient's EKG does not indicate STEMI.    FINAL DIAGNOSIS  1. Syncope, unspecified syncope type    2. Closed fracture of olecranon process of left ulna, initial encounter    3. Closed head injury, initial encounter         Electronically signed by: Can Figueroa M.D., 6/21/2025 9:45 AM           [1]   Allergies  Allergen Reactions    Naprosyn [Naproxen] Rash     Rash on body    Pcn [Penicillins]      Reaction when she was a child

## 2025-06-21 NOTE — PROGRESS NOTES
Pt arrived to unit from ED at approx 1300. Pt ambulated to bed steadily. Chart, labs, and orders reviewed. Pt resting in bed, breathing even and unlabored, denies elbow pain and in no acute distress. A&O x3. Tele monitoring initiated. Fall precautions including bed alarm in place and education provided. Call light within reach, bed locked and in lowest position, denies other needs at this time.

## 2025-06-21 NOTE — H&P
Hospital Medicine History & Physical Note    Date of Service  6/21/2025    Primary Care Physician  ELIZABETH Michelle    Consultants  orthopedics    Specialist Names: [place]    Code Status  Prior    Chief Complaint  Chief Complaint   Patient presents with    Syncope    Elbow Pain     Left side       History of Presenting Illness  Bernie Barrientos is a 84 y.o. female who presented 6/21/2025 with, dyslipidemia.  Recent medical history is significant for 2 presentations to the emergency room for complaint of diarrhea and 1 admission last month.  Family with whom I have spoken, are uncertain if she has not had any ongoing diarrhea since she was last here on May 22.  At that time she had a CT scan which was interpreted as colitis, and the patient was discharged from the ED with supportive care.    Patient is a resident of an assisted living from which she was sent to us this morning by EMS.  She apparently had come down to eat breakfast and was seemingly in her normal state of health, then stood and was walking towards the hallway when she collapsed.  Is unclear if she fell, or had a syncopal event.  Staff caught her up to a chair, and where she syncopized twice before EMS arrival.  Patient herself is unable to give me any significant history due to her underlying Alzheimer's.    Here in the ED the patient has had an imaging workup which includes plain films of her elbow which demonstrate a nondisplaced olecranon fracture.        I discussed the plan of care with patient and family.    Review of Systems  Review of Systems   Unable to perform ROS: Dementia   Constitutional:  Negative for chills and fever.   Respiratory:  Negative for cough and shortness of breath.    Cardiovascular:  Negative for chest pain, palpitations and leg swelling.   Gastrointestinal:  Negative for abdominal pain, diarrhea, nausea and vomiting.   Genitourinary:  Negative for dysuria and urgency.   Musculoskeletal:  Negative for back pain.         Left elbow pain   Skin:  Negative for rash.   Neurological:  Negative for dizziness, loss of consciousness and headaches.       Past Medical History   has a past medical history of Alzheimer's dementia with agitation (HCC), Anesthesia, Arthritis (2003), Colon polyp, Colon polyps, Dental disorder, DJD (degenerative joint disease), Heart valve disease (2005), Hypothyroidism, Migraine, Postmenopausal hormone replacement therapy, and Sleep apnea.    Surgical History   has a past surgical history that includes tonsillectomy (as child); appendectomy (1957); hand surgery (2004); hand surgery (2009); colonoscopy with polyp (8/15/2013); abdominal hysterectomy total (1984); hip arthroplasty total (Bilateral); and cervical fusion posterior.     Family History  Family History   Problem Relation Age of Onset    Colorectal Cancer Mother     Diabetes Mother     No Known Problems Father         Passed away before the pt was born     Thyroid Sister     Diabetes Other     Heart Disease Neg Hx     Hypertension Neg Hx     Hyperlipidemia Neg Hx     Breast Cancer Neg Hx     Peritoneal Cancer Neg Hx     Ovarian Cancer Neg Hx     Tubal Cancer Neg Hx         Family history reviewed with patient. There is no family history that is pertinent to the chief complaint.     Social History   reports that she quit smoking about 45 years ago. Her smoking use included cigarettes. She started smoking about 53 years ago. She has a 8 pack-year smoking history. She has never used smokeless tobacco. She reports current alcohol use of about 1.2 oz of alcohol per week. She reports that she does not use drugs.    Allergies  Allergies[1]    Medications  Prior to Admission Medications   Prescriptions Last Dose Informant Patient Reported? Taking?   QUEtiapine (SEROQUEL) 25 MG Tab Not Taking Patient No No   Sig: Take 1 Tablet by mouth 2 times a day. FOR AGITATION   Patient not taking: Reported on 6/21/2025   acetaminophen (TYLENOL) 500 MG Tab 6/18/2025  Patient Yes No   Sig: Take 500-1,000 mg by mouth every 6 hours as needed. Indications: Pain   desvenlafaxine succinate (PRISTIQ) 50 MG TABLET SR 24 HR 6/20/2025 Patient No Yes   Sig: Take 1 Tablet by mouth every day. FOR DEPRESSION   diclofenac sodium (VOLTAREN) 1 % Gel Not Taking Patient No No   Sig: Apply 4 g topically 4 times a day as needed (ARTHRITIS PAIN/KNEE PAIN).   Patient not taking: Reported on 6/21/2025   levothyroxine (SYNTHROID) 150 MCG Tab 6/20/2025 Patient No Yes   Sig: Take 1 Tablet by mouth every morning on an empty stomach.   ondansetron (ZOFRAN ODT) 4 MG TABLET DISPERSIBLE Not Taking Patient No No   Sig: Take 1 Tablet by mouth every 6 hours as needed for Nausea/Vomiting.   Patient not taking: Reported on 6/21/2025      Facility-Administered Medications: None       Physical Exam  Temp:  [36.5 °C (97.7 °F)] 36.5 °C (97.7 °F)  Pulse:  [60] 60  Resp:  [16] 16  BP: (159)/(65) 159/65  SpO2:  [95 %] 95 %  Blood Pressure : (!) 159/65   Temperature: 36.5 °C (97.7 °F)   Pulse: 60   Respiration: 16   Pulse Oximetry: 95 %       Physical Exam  Constitutional:       General: She is not in acute distress.     Appearance: Normal appearance. She is well-developed. She is not diaphoretic.   HENT:      Head: Normocephalic and atraumatic.   Neck:      Vascular: No JVD.   Cardiovascular:      Rate and Rhythm: Normal rate and regular rhythm.      Heart sounds: Murmur heard.   Pulmonary:      Effort: Pulmonary effort is normal. No respiratory distress.      Breath sounds: No stridor. No wheezing or rales.   Abdominal:      Palpations: Abdomen is soft.      Tenderness: There is no abdominal tenderness. There is no guarding or rebound.   Musculoskeletal:      Right lower leg: No edema.      Left lower leg: No edema.      Comments: Edema and palpable effusion in the left elbow with limited range of motion.  Good peripheral pulses with both hands being warm and pink and normal capillary refill   Skin:     General: Skin is  "warm and dry.      Findings: No rash.   Neurological:      Comments: Patient is oriented to self only  She is alert interactive  Face symmetric  Speech clear content logical within bounds of her dementia  Tongue is midline  EOMI  Patient has antigravity strength x 4 extremities, left upper extremity elbow was not tested due to presence of a fracture.    Based on my discussion with family, the patient would appear to be functioning at her cognitive baseline   Psychiatric:         Mood and Affect: Mood normal.         Behavior: Behavior normal.         Laboratory:  Recent Labs     06/21/25  0939   WBC 4.2*   RBC 4.39   HEMOGLOBIN 13.0   HEMATOCRIT 39.8   MCV 90.7   MCH 29.6   MCHC 32.7   RDW 48.1   PLATELETCT 211   MPV 10.0     Recent Labs     06/21/25  0939   SODIUM 137   POTASSIUM 4.6   CHLORIDE 105   CO2 22   GLUCOSE 136*   BUN 18   CREATININE 1.27   CALCIUM 8.4*     Recent Labs     06/21/25  0939   ALTSGPT 10   ASTSGOT 17   ALKPHOSPHAT 53   TBILIRUBIN 0.4   GLUCOSE 136*     Recent Labs     06/21/25  0939   APTT 24.6*   INR 1.17*     No results for input(s): \"NTPROBNP\" in the last 72 hours.      Recent Labs     06/21/25  0939   TROPONINT 11       Imaging:  DX-CHEST-PORTABLE (1 VIEW)   Final Result      Patchy bilateral infiltrates.      DX-ELBOW-COMPLETE 3+ LEFT   Final Result      Mildly displaced olecranon fracture.      CT-CTA NECK WITH & W/O-POST PROCESSING   Final Result         1. Mild carotid bifurcation calcification. No carotid or vertebral artery stenosis or occlusion in the neck.      2. C-spine fusion hardware.                           CT-CTA HEAD WITH & W/O-POST PROCESS   Final Result         1. CT weighted CT head without contrast does not show any acute process.      2. CTA does not show any aneurysm, large vessel occlusion or venous thrombosis.                  CT-CSPINE WITHOUT PLUS RECONS   Final Result         1. No cervical spine fracture.      2. Extensive fusion hardware. Prominent " degenerative changes.      3. Lung apex emphysema.                      X-Ray:  I have personally reviewed the images and compared with prior images.  EKG:  I have personally reviewed the images and compared with prior images.    Assessment/Plan:  Justification for Admission Status  I anticipate this patient will require at least two midnights for appropriate medical management, necessitating inpatient admission because syncope and abnormal chest x-ray    Patient will need a Telemetry bed on MEDICAL service .  The need is secondary to syncope and abnormal chest x-ray.    * Syncope and collapse- (present on admission)  Assessment & Plan  Patient had 2 syncopal events after falling and breaking her elbow.  It is unclear if the initial fall was related to syncope or mechanical fall.  EKG which I have personally reviewed demonstrates a sinus bradycardia with a rate in the upper 50s.  Q waves in the anterior leads present on previous studies.  No evidence of rhythm abnormalities, conduction abnormalities, Brugada HOCM, or preexcitation.  Admit to telemetry  Check cardiac echo  Check orthostatic BPs  PT OT consultations      Abnormal chest x-ray  Assessment & Plan  Bilateral patchy infiltrates noted on chest x-ray which I personally reviewed.  Patient has no evidence of respiratory infection check CT of the chest      Closed fracture of olecranon process of left ulna  Assessment & Plan  Orthopedic surgery consulted    Diarrhea- (present on admission)  Assessment & Plan  Patient was seen twice last month with diarrhea, diagnosed with colitis and treated supportively  Unknown if she is having ongoing diarrhea now as she is unable to tell me and family is uncertain  We will monitor her, if she should develop diarrhea while in house then we will need to workup further    Moderate late onset Alzheimer's dementia with agitation (HCC)- (present on admission)  Assessment & Plan  Based on discussion with family, the patient would  appear to be at her baseline    Chronic renal failure, stage 3a- (present on admission)  Assessment & Plan  Baseline creatinine around 1.1-1.2  Gentle fluid resuscitation  Daily BMP  Renally dose medications as appropriate    Acquired hypothyroidism- (present on admission)  Assessment & Plan  TSH today 1.660  Continue replacement        VTE prophylaxis: Xarelto 10 mg daily as prophylaxis       [1]   Allergies  Allergen Reactions    Naprosyn [Naproxen] Rash     Rash on body    Pcn [Penicillins]      Reaction when she was a child

## 2025-06-21 NOTE — ASSESSMENT & PLAN NOTE
Orthopedic surgery consulted  -continue splint   -non operative management   -outpatient follow up   PT/OT

## 2025-06-21 NOTE — ASSESSMENT & PLAN NOTE
Baseline creatinine around 1.1-1.2  Gentle fluid resuscitation  Daily BMP  Renally dose medications as appropriate

## 2025-06-21 NOTE — DISCHARGE PLANNING
"TCN following. HTH/SCP chart review completed. Note pt currently in ED after syncopal episode hitting her head and left elbow.     Per review, noted acute hospitalization 5/8-5/11/2025 in the setting of Colitis. Patient was discharged home with [facility] and supplemental O2 ([employer]). Note that [facility] was unable to initiate care until pt transitioned to her new home at her [facility]. Patient has subsequently transitioned to an assisted living facility due to significant cognitive impairments associated with Alzheimer's dementia. Noted pt with ED visit on 5/22 as well 2' to diarrhea where she was dc'd to home with [facility] resumption. Per review pt appears to have been dc'd from [facility] servies on 6/18. Note that per PT visit on 6/10, \"she is at her functional baseline. Due to cognitive impairment she will require supervision with functional tasks.  The patient was discharged to 24/7 care of [facility]\"    Per review, no noted documented mobility yet in chart. Pt remains on RA. Given current review and recent hx, anticipating that pt will be capable of dc back to her [facility] where she appears to have 24/7 support as needed (either directly from ED or after admission to [facility] if warranted). Note that dc location will be highly dependent on results of imaging and interventions as well. Per chart patient has non [facility] PCP ([facility]).     If pt does NOT warrant admission to [facility] and is functionally unable to dc to home, please reach out to TCN for assistance with SCP auth for dc directly to SNF from ED if needed.      If pt admits to [facility], TCN will continue to monitor and assist with SCP/HTH authorization consideration for post acute needs if indicated. Please reach out to TCN via VOALTE if post acute transitional care needs are warranted for dc planning.     Update: pt admitted/inpatient status 2' to \"syncope and collapse\"; also noted \"Left olecrannon fracture\" and orthopedic consult  "

## 2025-06-21 NOTE — PROGRESS NOTES
Left olecrannon fracture  Posterior splint  Surgery only if displacement occurs on follow up xrays

## 2025-06-21 NOTE — ASSESSMENT & PLAN NOTE
Bilateral patchy infiltrates noted on chest x-ray which I personally reviewed.  CT chest showed emphysematous changes and ground glass opacities   -procalcitonin negative   -likely ILD

## 2025-06-21 NOTE — ASSESSMENT & PLAN NOTE
Patient had 2 syncopal events after falling and breaking her elbow.  It is unclear if the initial fall was related to syncope or mechanical fall.  EKG which I have personally reviewed demonstrates a sinus bradycardia with a rate in the upper 50s.  Q waves in the anterior leads present on previous studies.  No evidence of rhythm abnormalities, conduction abnormalities, Brugada HOCM, or preexcitation.  Admit to telemetry  Check cardiac echo  Orthostatics negative   PT OT consultations

## 2025-06-21 NOTE — ED NOTES
Chief Complaint   Patient presents with    Syncope    Elbow Pain     Left side     Pt bib ems from [facility] per report pt has been c/o dizziness started a month ago, she was sitting down and had syncopal episode hitting her head and left elbow. When ems arrived bp was 70/palp  SB 49, fsbs 158.  She was given 350cc ivf pta bp now 149/64.   Pt taken to ct then red 2, gave pt's keys and denture back to her.   Gave report to Johana MELLO

## 2025-06-22 LAB
ANION GAP SERPL CALC-SCNC: 11 MMOL/L (ref 7–16)
BUN SERPL-MCNC: 15 MG/DL (ref 8–22)
CALCIUM SERPL-MCNC: 8.8 MG/DL (ref 8.5–10.5)
CHLORIDE SERPL-SCNC: 104 MMOL/L (ref 96–112)
CO2 SERPL-SCNC: 23 MMOL/L (ref 20–33)
CREAT SERPL-MCNC: 1.11 MG/DL (ref 0.5–1.4)
ERYTHROCYTE [DISTWIDTH] IN BLOOD BY AUTOMATED COUNT: 48.6 FL (ref 35.9–50)
GFR SERPLBLD CREATININE-BSD FMLA CKD-EPI: 48 ML/MIN/1.73 M 2
GLUCOSE SERPL-MCNC: 110 MG/DL (ref 65–99)
HCT VFR BLD AUTO: 40.9 % (ref 37–47)
HGB BLD-MCNC: 13 G/DL (ref 12–16)
MCH RBC QN AUTO: 29.3 PG (ref 27–33)
MCHC RBC AUTO-ENTMCNC: 31.8 G/DL (ref 32.2–35.5)
MCV RBC AUTO: 92.3 FL (ref 81.4–97.8)
PLATELET # BLD AUTO: 228 K/UL (ref 164–446)
PMV BLD AUTO: 10.2 FL (ref 9–12.9)
POTASSIUM SERPL-SCNC: 4.2 MMOL/L (ref 3.6–5.5)
PROCALCITONIN SERPL-MCNC: 0.12 NG/ML
RBC # BLD AUTO: 4.43 M/UL (ref 4.2–5.4)
SODIUM SERPL-SCNC: 138 MMOL/L (ref 135–145)
WBC # BLD AUTO: 7.2 K/UL (ref 4.8–10.8)

## 2025-06-22 PROCEDURE — 770020 HCHG ROOM/CARE - TELE (206)

## 2025-06-22 PROCEDURE — 80048 BASIC METABOLIC PNL TOTAL CA: CPT

## 2025-06-22 PROCEDURE — 36415 COLL VENOUS BLD VENIPUNCTURE: CPT

## 2025-06-22 PROCEDURE — 700102 HCHG RX REV CODE 250 W/ 637 OVERRIDE(OP): Performed by: INTERNAL MEDICINE

## 2025-06-22 PROCEDURE — 84145 PROCALCITONIN (PCT): CPT

## 2025-06-22 PROCEDURE — A9270 NON-COVERED ITEM OR SERVICE: HCPCS | Performed by: INTERNAL MEDICINE

## 2025-06-22 PROCEDURE — 99233 SBSQ HOSP IP/OBS HIGH 50: CPT | Performed by: INTERNAL MEDICINE

## 2025-06-22 PROCEDURE — 97162 PT EVAL MOD COMPLEX 30 MIN: CPT

## 2025-06-22 PROCEDURE — A9270 NON-COVERED ITEM OR SERVICE: HCPCS | Performed by: HOSPITALIST

## 2025-06-22 PROCEDURE — 85027 COMPLETE CBC AUTOMATED: CPT

## 2025-06-22 PROCEDURE — 700102 HCHG RX REV CODE 250 W/ 637 OVERRIDE(OP): Performed by: HOSPITALIST

## 2025-06-22 PROCEDURE — 97166 OT EVAL MOD COMPLEX 45 MIN: CPT

## 2025-06-22 RX ORDER — AMLODIPINE BESYLATE 5 MG/1
5 TABLET ORAL
Status: DISCONTINUED | OUTPATIENT
Start: 2025-06-22 | End: 2025-06-24 | Stop reason: HOSPADM

## 2025-06-22 RX ADMIN — RIVAROXABAN 10 MG: 10 TABLET, FILM COATED ORAL at 16:39

## 2025-06-22 RX ADMIN — VENLAFAXINE HYDROCHLORIDE 37.5 MG: 75 TABLET ORAL at 16:38

## 2025-06-22 RX ADMIN — ACETAMINOPHEN 1000 MG: 500 TABLET ORAL at 05:58

## 2025-06-22 RX ADMIN — AMLODIPINE BESYLATE 5 MG: 5 TABLET ORAL at 16:39

## 2025-06-22 RX ADMIN — LEVOTHYROXINE SODIUM 150 MCG: 0.07 TABLET ORAL at 05:45

## 2025-06-22 RX ADMIN — THERA TABS 1 TABLET: TAB at 05:45

## 2025-06-22 RX ADMIN — VENLAFAXINE HYDROCHLORIDE 37.5 MG: 75 TABLET ORAL at 07:40

## 2025-06-22 ASSESSMENT — ENCOUNTER SYMPTOMS
SHORTNESS OF BREATH: 0
DEPRESSION: 0
HEADACHES: 0
DIZZINESS: 0
CHILLS: 0
FALLS: 1
NAUSEA: 0
MEMORY LOSS: 1
FEVER: 0
ABDOMINAL PAIN: 0

## 2025-06-22 ASSESSMENT — COGNITIVE AND FUNCTIONAL STATUS - GENERAL
PERSONAL GROOMING: A LITTLE
DAILY ACTIVITIY SCORE: 20
DRESSING REGULAR UPPER BODY CLOTHING: A LITTLE
SUGGESTED CMS G CODE MODIFIER DAILY ACTIVITY: CJ
MOBILITY SCORE: 24
HELP NEEDED FOR BATHING: A LITTLE
DRESSING REGULAR LOWER BODY CLOTHING: A LITTLE
SUGGESTED CMS G CODE MODIFIER MOBILITY: CH

## 2025-06-22 ASSESSMENT — PAIN DESCRIPTION - PAIN TYPE
TYPE: ACUTE PAIN
TYPE: ACUTE PAIN

## 2025-06-22 ASSESSMENT — GAIT ASSESSMENTS
DISTANCE (FEET): 500
ASSISTIVE DEVICE: FRONT WHEEL WALKER
GAIT LEVEL OF ASSIST: STANDBY ASSIST
DEVIATION: NO DEVIATION

## 2025-06-22 ASSESSMENT — FIBROSIS 4 INDEX: FIB4 SCORE: 2

## 2025-06-22 ASSESSMENT — ACTIVITIES OF DAILY LIVING (ADL): TOILETING: INDEPENDENT

## 2025-06-22 NOTE — THERAPY
Physical Therapy   Initial Evaluation     Patient Name:  Bernie Barrientos  Age:  84 y.o., Sex:  female  Medical Record #:  [id number]  Today's Date: 6/22/2025     Precautions  Medical: Other (see comments) (Baseline Alzheimer's dementia)    Assessment  Patient is 84 y.o. female presenting for GLF d/t syncope resulting in head strike and L closed olecranon process fx (non-op) w/ a PMH of DLD, renal failure, hypothyroidism, and Alzheimer's disease.  She currently lives at [facility], and is unable to provide an accurate PLOF given extent of Alzheimer's dementia (see flowsheet for home set-up and PLOF).    Currently, the pt displays baseline cognitive/memory impairment, however no focal deficits in terms of LE strength, ROM, balance or gait stability, and is able to demo functional mobility tasks requiring very little assistance.  She demo'd supine<>sit EOB SPV w/ HOB flat and no rails, along w/ STS EOB using FWW SBA.  The pt completed gait 500' using FWW SBA w/ no significant gait deviations or LOB noted, and distance limited by fatigue.  Anticipate pt will have no further PT needs upon DC given extent of independence currently demo'd w/ functional mobility tasks. Will not follow, please re consult should there be a change in the pt's condition.     Plan    Physical Therapy Initial Treatment Plan   Duration: Evaluation only    DC Equipment Recommendations: None  Discharge Recommendations: Anticipate that the patient will have no further physical therapy needs after discharge from the hospital       Subjective/Objective       06/22/25 1531   Prior Living Situation   Prior Services Continuous (24 Hour) Care Giving Per Service   Housing / Facility Assisted Living Residence  ([facility])   Steps Into Home 0   Steps In Home 0   Lives with - Patient's Self Care Capacity Attendant / Paid Care Giver   Comments Pt unable to provide information regarding AD use d/t poor memory   Prior Level of Functional Mobility   Bed Mobility  Unable To Determine At This Time   Transfer Status Unable To Determine At This Time   Ambulation Unable To Determine At This Time   Assistive Devices Used Unable to Determine At This Time   Wheelchair Unable To Determine At This Time   Stairs Unable To Determine At This Time   Comments pt unable to provide accurate PLOF d/t poor memory 2/2 alzheimer's dementia   History of Falls   History of Falls Yes   Date of Last Fall   (Reason for admit, d/t syncope)   Cognition    Cognition / Consciousness X   Level of Consciousness Alert   Comments Pt has baseline alzheimer's dementia, otherwise pleasant and cooperative   Passive ROM Lower Body   Passive ROM Lower Body WDL   Active ROM Lower Body    Active ROM Lower Body  WDL   Comments observed during functional mobility tasks   Strength Lower Body   Lower Body Strength  WDL   Comments as above   Sensation Lower Body   Lower Extremity Sensation   WDL   Comments pt denies numbness/tingling in her LE's   Coordination Lower Body    Coordination Lower Body  WDL   Vision   Vision Comments Visual fields intact, smooth pursuit and saccades WNL   Balance Assessment   Sitting Balance (Static) Fair +   Sitting Balance (Dynamic) Fair +   Standing Balance (Static) Fair +   Standing Balance (Dynamic) Fair +   Weight Shift Sitting Good   Weight Shift Standing Good   Comments stand w/ FWW   Bed Mobility    Supine to Sit Supervised   Sit to Supine Supervised   Scooting Supervised   Rolling Supervised   Comments HOB flat, no bed rails   Gait Analysis   Gait Level Of Assist Standby Assist   Assistive Device Front Wheel Walker   Distance (Feet) 500   # of Times Distance was Traveled 1   Deviation No deviation   Weight Bearing Status no restrictions   Vision Deficits Impacting Mobility pt denies   Comments distance limited by fatigue   Functional Mobility   Sit to Stand Standby Assist   Bed, Chair, Wheelchair Transfer Standby Assist   Transfer Method Stand Step   Mobility STS EOB using FWW;  EOB<>hallway using FWW   Activity Tolerance   Sitting Edge of Bed 6min   Standing 10min   Edema / Skin Assessment   Edema / Skin  Not Assessed   Education Group   Education Provided Role of Physical Therapist   Role of Physical Therapist Patient Response Patient;Acceptance;Explanation;Demonstration;Action Demonstration   Additional Comments pt receptive of edu provided   Problem List    Problems Pain   Interdisciplinary Plan of Care Collaboration   IDT Collaboration with  Nursing;Occupational Therapist   Patient Position at End of Therapy In Bed;Bed Alarm On;Call Light within Reach;Tray Table within Reach;Phone within Reach   Collaboration Comments regarding outcome of tx session   Session Information   Date / Session Number  6/22- eval only

## 2025-06-22 NOTE — CARE PLAN
The patient is Stable - Low risk of patient condition declining or worsening    Shift Goals  Clinical Goals: safety, monitor oxygenation  Patient Goals: Rest and comfort  Family Goals: JOAHNNY.  No family present    Progress made toward(s) clinical / shift goals:    Problem: Pain - Standard  Goal: Alleviation of pain or a reduction in pain to the patient’s comfort goal  Outcome: Progressing     Problem: Knowledge Deficit - Standard  Goal: Patient and family/care givers will demonstrate understanding of plan of care, disease process/condition, diagnostic tests and medications  Outcome: Progressing     Problem: Fall Risk  Goal: Patient will remain free from falls  Outcome: Progressing     Problem: Respiratory  Goal: Patient will achieve/maintain optimum respiratory ventilation and gas exchange  Outcome: Progressing     Problem: Mobility  Goal: Patient's capacity to carry out activities will improve  Outcome: Progressing       06:45-19:15  Patient A&Ox2; self and place. Patient is pleasantly confused. Denies pain this morning. Patient may need oxygen when asleep; RA baseline. Hand held assist OOB. Bed locked in lowest position with bed alarm activated. Call light within reach. Safety precautions maintained.

## 2025-06-22 NOTE — THERAPY
"Occupational Therapy   Initial Evaluation     Patient Name:  Bernie Barrientos  Age:  84 y.o., Sex:  female  Medical Record #:  [id number]  Today's Date:  6/22/2025     Precautions  Medical: (P) Other (see comments) (Baseline Alzheimer's dementia)    Assessment    Patient is pleasantly confused 84 y.o. female with a PMHx significant for Alzheimer's dementia with agitation, dyslipidemia, renal failure, arthritis, DJD, and heart valve disease. Presented to the hospital 6/21 following syncope and collapse resulting in head strike and L closed olecranon process fx (non-op). Pt greeted and seen for OT eval. Required SBA for mobility and min A for ADLs. Perseverating on splint removal; redirectable. Currently limited by impaired cognition, coordination, AROM, strength, activity tolerance, functional mobility, and pain which are currently affecting patients ability to complete ADL/IADLs at baseline. Will continue to follow.    Plan    Occupational Therapy Initial Treatment Plan   Treatment Interventions: (P) Self Care / Activities of Daily Living, Adaptive Equipment, Manual Therapy Techniques, Neuro Re-Education / Balance, Therapeutic Exercises, Therapeutic Activity, Family / Caregiver Training  Treatment Frequency: (P) 3 Times per Week  Duration: (P) Until Therapy Goals Met    DC Equipment Recommendations: (P) Unable to determine at this time  Discharge Recommendations: (P) Recommend home health for continued occupational therapy services (Return to [facility] with increased assist from staff as needed)     Subjective    \"I'm just so confused.\"  \"Will they take this splint off tomorrow.\"     Objective     06/22/25 7741   Prior Living Situation   Prior Services Continuous (24 Hour) Care Giving Per Service   Housing / Facility Assisted Living Residence  ([facility])   Steps Into Home 0   Steps In Home 0   Lives with - Patient's Self Care Capacity Attendant / Paid Care Giver   Comments Pt unable to provide information regarding AD use / " home setup d/t poor memory   Prior Level of ADL Function   Self Feeding Independent   Grooming / Hygiene Independent   Bathing Independent   Dressing Independent   Toileting Independent   Comments Per pt report   Prior Level of IADL Function   Medication Management Dependent   Laundry Dependent   Kitchen Mobility Dependent   Finances Dependent   Home Management Dependent   Shopping Dependent   History of Falls   History of Falls Yes   Date of Last Fall   (Reason for admit d/t syncope)   Precautions   Medical Other (see comments)  (Baseline Alzheimer's dementia)   Vitals   O2 Delivery Device None - Room Air   Vitals Comments No c/o dizziness or light headedness   Pain 0 - 10 Group   Therapist Pain Assessment During Activity;Post Activity Pain Same as Prior to Activity;Nurse Notified  (Increased pain with mobility; not quantified)   Cognition    Cognition / Consciousness X   Level of Consciousness Alert   Safety Awareness Impaired   New Learning Impaired   Attention Impaired   Comments Very pleasant and participatory. Required cues to attend and redirection; baseline alzheimers dementia.   Active ROM Upper Body   Active ROM Upper Body  X   Dominant Hand Right   Comments L elbow limited by pain and splint   Strength Upper Body   Upper Body Strength  X   Comments L elbow limited by pain and splint   Coordination Upper Body   Coordination X   Comments GM limited by splint   Balance Assessment   Sitting Balance (Static) Fair +   Sitting Balance (Dynamic) Fair +   Standing Balance (Static) Fair +   Standing Balance (Dynamic) Fair +   Weight Shift Sitting Good   Weight Shift Standing Good   Comments FWW in standing   Bed Mobility    Supine to Sit Supervised   Sit to Supine Supervised   Scooting Supervised   Rolling Supervised   Comments HOB flat with no rails   ADL Assessment   Eating Supervision   Grooming Minimal Assist;Standing  (brush hair)   Lower Body Dressing Minimal Assist  (CGA to doff socks; mod A to don)    Toileting   (Declined need)   Functional Mobility   Sit to Stand Standby Assist   Bed, Chair, Wheelchair Transfer Standby Assist   Toilet Transfers   (Declined need)   Transfer Method Stand Step   Mobility FWW; bed > sink > hallway > bed   Visual Perception   Comments Denies changes   Activity Tolerance   Comments No c/o fatigue   Patient / Family Goals   Patient / Family Goal #1 To get this splint off   Short Term Goals   Short Term Goal # 1 Pt will complete standing g/h routine with SPV   Short Term Goal # 2 Pt will complete LB dressing with SPV and AE PRN   Short Term Goal # 3 Pt will complete toileting ADLs with SPV   Education Group   Education Provided Role of Occupational Therapist   Role of Occupational Therapist Patient Response Patient;Acceptance;Explanation;Verbal Demonstration   Occupational Therapy Initial Treatment Plan    Treatment Interventions Self Care / Activities of Daily Living;Adaptive Equipment;Manual Therapy Techniques;Neuro Re-Education / Balance;Therapeutic Exercises;Therapeutic Activity;Family / Caregiver Training   Treatment Frequency 3 Times per Week   Duration Until Therapy Goals Met   Problem List   Problem List Decreased Active Daily Living Skills;Decreased Homemaking Skills;Decreased Upper Extremity Strength Left;Decreased Upper Extremity AROM Left;Decreased Upper Extremity PROM Left;Decreased Functional Mobility;Decreased Activity Tolerance;Safety Awareness Deficits / Cognition;Impaired Coordination Left Upper Extremity;Impaired Cognitive Function   Anticipated Discharge Equipment and Recommendations   DC Equipment Recommendations Unable to determine at this time   Discharge Recommendations Recommend home health for continued occupational therapy services  (Return to [facility] with increased assist from staff as needed)   Interdisciplinary Plan of Care Collaboration   IDT Collaboration with  Nursing;Physical Therapist   Patient Position at End of Therapy In Bed;Bed Alarm On;Call Light  within Reach;Krystian Table within Reach   Collaboration Comments OT report and recs; RN updated   Session Information   Date / Session Number  6/22, 1 (1/3, 6/28)

## 2025-06-22 NOTE — CARE PLAN
Problem: Pain - Standard  Goal: Alleviation of pain or a reduction in pain to the patient’s comfort goal  Outcome: Progressing     Problem: Knowledge Deficit - Standard  Goal: Patient and family/care givers will demonstrate understanding of plan of care, disease process/condition, diagnostic tests and medications  Outcome: Progressing     Problem: Fall Risk  Goal: Patient will remain free from falls  Outcome: Progressing     Problem: Hemodynamics  Goal: Patient's hemodynamics, fluid balance and neurologic status will be stable or improve  Outcome: Progressing     Problem: Respiratory  Goal: Patient will achieve/maintain optimum respiratory ventilation and gas exchange  Outcome: Progressing     Problem: Mobility  Goal: Patient's capacity to carry out activities will improve  Outcome: Progressing     Problem: Self Care  Goal: Patient will have the ability to perform ADLs independently or with assistance (bathe, groom, dress, toilet and feed)  Outcome: Progressing     Problem: Wound/ / Incision Healing  Goal: Patient's wound/surgical incision will decrease in size and heals properly  Outcome: Progressing   The patient is Stable - Low risk of patient condition declining or worsening    Shift Goals  Clinical Goals: Remain free from falls/injury, rest/comfort.  Stable vital signs  Patient Goals: Rest and comfort  Family Goals: JOHANNY.  No family present    Progress made toward(s) clinical / shift goals:  Patient has remained free from falls/injury.  Pt's vitals have remained stable.  Pt has been sleeping comfortably.  Pt's pain has been well controlled throughout the night     Patient is not progressing towards the following goals: N/A

## 2025-06-22 NOTE — PROGRESS NOTES
Received Bedside report. Assumed care at 1900. This pt is AOx2, ambulatory with x1 assistance, voiding adequately, 4/10 pain. Patient and RN discussed plan of care: questions answered. Labs noted, assessment complete, patient tolerating regular diet. Tele box in place. Pt is on room air. Call light in place, fall precautions in place, patient educated on importance of calling for assistance. No additional needs at this time. VSS

## 2025-06-22 NOTE — WOUND TEAM
[facility]  Inpatient Services  Wound and Skin Care Brief Evaluation    Admission Date: 6/21/2025     Last order of IP CONSULT TO WOUND CARE was found on 6/21/2025 from Hospital Encounter on 6/21/2025     HPI, PMH, SH: Reviewed    Chief Complaint   Patient presents with    Syncope    Elbow Pain     Left side     Diagnosis: Syncope and collapse [R55]    Unit where seen by Wound Team: T702/01     Wound consult placed regarding R. buttocks. Chart and images reviewed. This discussed with bedside RN. This clinician in to assess patient. Patient pleasant and agreeable. R. Buttocks . Non-selectively debrided with Moist warm washcloth.               No pressure injuries or advanced wound care needs identified. Wound consult completed. No further follow up unless indicated and consulted.          PREVENTATIVE INTERVENTIONS:    Q shift Munir - performed per nursing policy  Q shift pressure point assessments - performed per nursing policy    Surface/Positioning  Standard/trauma mattress - Currently in Place

## 2025-06-22 NOTE — PROGRESS NOTES
Monitor Summary:     Rhythm: SB w 1st degree  Rate: 53-55  Ectopy: (R) PVC  Measurements: 0.23/0.07/0.45              12 Hour Chart Check

## 2025-06-22 NOTE — PROGRESS NOTES
[facility] Medicine Daily Progress Note    Date of Service  6/22/2025    Chief Complaint  Bernie Barrientos is a 84 y.o. female admitted 6/21/2025 with syncope     Hospital Course   84 y.o. female who presented 6/21/2025 with, dyslipidemia.  Recent medical history is significant for 2 presentations to the emergency room for complaint of diarrhea and 1 admission last month.  Family with whom I have spoken, are uncertain if she has not had any ongoing diarrhea since she was last here on May 22.  At that time she had a CT scan which was interpreted as colitis, and the patient was discharged from the ED with supportive care.     Patient is a resident of an assisted living from which she was sent to us this morning by EMS.  She apparently had come down to eat breakfast and was seemingly in her normal state of health, then stood and was walking towards the hallway when she collapsed.  Is unclear if she fell, or had a syncopal event.  Staff caught her up to a chair, and where she syncopized twice before EMS arrival.  Patient herself is unable to give me any significant history due to her underlying Alzheimer's.     Here in the ED the patient has had an imaging workup which includes plain films of her elbow which demonstrate a nondisplaced olecranon fracture.       Interval Problem Update  6/22 BP stable on room air   WBC 7.2, HB 13   Cr 1.11, GFR 48   Orthostatics negative   CT chest showed moderate to severe emphysematous change, mild diffuse peripheral groundglass and interstitial opacities bilaterally  -procalcitonin negative   Echo pending   Telemetry reviewed 1st degree heart block sinus rhythm   PT/OT   Discussed with orthopedic surgery, XR read as mildly displaced olecranon fracture. Recommended non operative treatment continue splint and follow up outpatient  Patient is pleasantly confused. Complaining of pain in her elbow. She denies any dizziness, chest pain or dyspnea.      I have discussed this patient's plan of  care and discharge plan at IDT rounds today with Case Management, Nursing, Nursing leadership, and other members of the IDT team.    Consultants/Specialty  orthopedics    Code Status  DNAR/DNI    Disposition  The patient is not medically cleared for discharge to home or a post-acute facility.      I have placed the appropriate orders for post-discharge needs.    Review of Systems  Review of Systems   Constitutional:  Negative for chills and fever.   Respiratory:  Negative for shortness of breath.    Cardiovascular:  Negative for chest pain.   Gastrointestinal:  Negative for abdominal pain and nausea.   Musculoskeletal:  Positive for falls and joint pain.   Neurological:  Negative for dizziness and headaches.   Psychiatric/Behavioral:  Positive for memory loss. Negative for depression.    All other systems reviewed and are negative.       Physical Exam  Temp:  [36.3 °C (97.3 °F)-37 °C (98.6 °F)] 36.7 °C (98.1 °F)  Pulse:  [58-83] 58  Resp:  [18] 18  BP: (142-166)/(66-98) 152/71  SpO2:  [90 %-95 %] 90 %    Physical Exam  Vitals and nursing note reviewed.   Constitutional:       General: She is not in acute distress.     Appearance: She is ill-appearing.   HENT:      Head: Normocephalic and atraumatic.   Eyes:      Conjunctiva/sclera: Conjunctivae normal.   Cardiovascular:      Rate and Rhythm: Normal rate and regular rhythm.   Pulmonary:      Effort: Pulmonary effort is normal. No respiratory distress.      Breath sounds: Normal breath sounds. No wheezing.   Abdominal:      General: Abdomen is flat. There is no distension.      Palpations: Abdomen is soft.      Tenderness: There is no abdominal tenderness.   Musculoskeletal:         General: Tenderness present.      Cervical back: Neck supple.      Right lower leg: No edema.      Left lower leg: No edema.      Comments: Left arm in splint   Skin:     General: Skin is warm and dry.   Neurological:      General: No focal deficit present.      Mental Status: She is  alert. She is disoriented.      Cranial Nerves: No cranial nerve deficit.   Psychiatric:         Mood and Affect: Mood normal.         Behavior: Behavior normal.         Cognition and Memory: Memory is impaired.         Fluids    Intake/Output Summary (Last 24 hours) at 6/22/2025 1532  Last data filed at 6/22/2025 1400  Gross per 24 hour   Intake 240 ml   Output --   Net 240 ml        Laboratory  Recent Labs     06/21/25  0939 06/22/25  0129   WBC 4.2* 7.2   RBC 4.39 4.43   HEMOGLOBIN 13.0 13.0   HEMATOCRIT 39.8 40.9   MCV 90.7 92.3   MCH 29.6 29.3   MCHC 32.7 31.8*   RDW 48.1 48.6   PLATELETCT 211 228   MPV 10.0 10.2     Recent Labs     06/21/25  0939 06/22/25  0129   SODIUM 137 138   POTASSIUM 4.6 4.2   CHLORIDE 105 104   CO2 22 23   GLUCOSE 136* 110*   BUN 18 15   CREATININE 1.27 1.11   CALCIUM 8.4* 8.8     Recent Labs     06/21/25  0939   APTT 24.6*   INR 1.17*               Imaging  CT-CHEST (THORAX) WITH   Final Result      1.  Moderate to severe emphysematous changes.   2.  Mild diffuse peripheral groundglass and interstitial opacities bilaterally, most in the lung bases. These could relate to chronic interstitial lung disease.   3.  Additional patchy opacity in the bilateral lung bases. Superimposed infection cannot be excluded.         DX-CHEST-PORTABLE (1 VIEW)   Final Result      Patchy bilateral infiltrates.      DX-ELBOW-COMPLETE 3+ LEFT   Final Result      Mildly displaced olecranon fracture.      CT-CTA NECK WITH & W/O-POST PROCESSING   Final Result         1. Mild carotid bifurcation calcification. No carotid or vertebral artery stenosis or occlusion in the neck.      2. C-spine fusion hardware.                           CT-CTA HEAD WITH & W/O-POST PROCESS   Final Result         1. CT weighted CT head without contrast does not show any acute process.      2. CTA does not show any aneurysm, large vessel occlusion or venous thrombosis.                  CT-CSPINE WITHOUT PLUS RECONS   Final Result          1. No cervical spine fracture.      2. Extensive fusion hardware. Prominent degenerative changes.      3. Lung apex emphysema.                  EC-ECHOCARDIOGRAM COMPLETE W/O CONT    (Results Pending)        Assessment/Plan  * Syncope and collapse- (present on admission)  Assessment & Plan  Patient had 2 syncopal events after falling and breaking her elbow.  It is unclear if the initial fall was related to syncope or mechanical fall.  EKG which I have personally reviewed demonstrates a sinus bradycardia with a rate in the upper 50s.  Q waves in the anterior leads present on previous studies.  No evidence of rhythm abnormalities, conduction abnormalities, Brugada HOCM, or preexcitation.  Admit to telemetry  Check cardiac echo  Orthostatics negative   PT OT consultations      Abnormal chest x-ray  Assessment & Plan  Bilateral patchy infiltrates noted on chest x-ray which I personally reviewed.  CT chest showed emphysematous changes and ground glass opacities   -procalcitonin negative   -likely ILD       Closed fracture of olecranon process of left ulna  Assessment & Plan  Orthopedic surgery consulted  -continue splint   -non operative management   -outpatient follow up   PT/OT     Diarrhea- (present on admission)  Assessment & Plan  Patient was seen twice last month with diarrhea, diagnosed with colitis and treated supportively  Unknown if she is having ongoing diarrhea now as she is unable to tell me and family is uncertain  We will monitor her, if she should develop diarrhea while in house then we will need to workup further    No diarrhea thus far per nursing     Moderate late onset Alzheimer's dementia with agitation (HCC)- (present on admission)  Assessment & Plan  Based on discussion with family, the patient would appear to be at her baseline    Chronic renal failure, stage 3a- (present on admission)  Assessment & Plan  Baseline creatinine around 1.1-1.2  Gentle fluid resuscitation  Daily BMP  Renally dose  medications as appropriate    Acquired hypothyroidism- (present on admission)  Assessment & Plan  TSH today 1.660  Continue replacement       Total time spent in chart review, at bedside with the patient, discussing with consultants, nursing and case management: 53 minutes     VTE prophylaxis: xarelto 10 mg prophylaxis     I have performed a physical exam and reviewed and updated ROS and Plan today (6/22/2025). In review of yesterday's note (6/21/2025), there are no changes except as documented above.

## 2025-06-23 ENCOUNTER — APPOINTMENT (OUTPATIENT)
Dept: CARDIOLOGY | Facility: MEDICAL CENTER | Age: 85
DRG: 312 | End: 2025-06-23
Attending: INTERNAL MEDICINE
Payer: MEDICARE

## 2025-06-23 ENCOUNTER — PHARMACY VISIT (OUTPATIENT)
Dept: PHARMACY | Facility: MEDICAL CENTER | Age: 85
End: 2025-06-23
Payer: COMMERCIAL

## 2025-06-23 LAB
FLUAV RNA SPEC QL NAA+PROBE: NEGATIVE
FLUBV RNA SPEC QL NAA+PROBE: NEGATIVE
LV EJECT FRACT MOD 2C 99903: 55.45
LV EJECT FRACT MOD 4C 99902: 63.48
LV EJECT FRACT MOD BP 99901: 61.02
RSV RNA SPEC QL NAA+PROBE: NEGATIVE
SARS-COV-2 RNA RESP QL NAA+PROBE: NOTDETECTED
SPECIMEN SOURCE: NORMAL

## 2025-06-23 PROCEDURE — A9270 NON-COVERED ITEM OR SERVICE: HCPCS | Performed by: HOSPITALIST

## 2025-06-23 PROCEDURE — 700102 HCHG RX REV CODE 250 W/ 637 OVERRIDE(OP): Performed by: INTERNAL MEDICINE

## 2025-06-23 PROCEDURE — 99233 SBSQ HOSP IP/OBS HIGH 50: CPT | Performed by: INTERNAL MEDICINE

## 2025-06-23 PROCEDURE — 770001 HCHG ROOM/CARE - MED/SURG/GYN PRIV*

## 2025-06-23 PROCEDURE — RXMED WILLOW AMBULATORY MEDICATION CHARGE: Performed by: INTERNAL MEDICINE

## 2025-06-23 PROCEDURE — 93306 TTE W/DOPPLER COMPLETE: CPT

## 2025-06-23 PROCEDURE — 700102 HCHG RX REV CODE 250 W/ 637 OVERRIDE(OP): Performed by: HOSPITALIST

## 2025-06-23 PROCEDURE — A9270 NON-COVERED ITEM OR SERVICE: HCPCS | Performed by: INTERNAL MEDICINE

## 2025-06-23 PROCEDURE — 0241U HCHG SARS-COV-2 COVID-19 NFCT DS RESP RNA 4 TRGT MIC: CPT

## 2025-06-23 PROCEDURE — 93306 TTE W/DOPPLER COMPLETE: CPT | Mod: 26 | Performed by: STUDENT IN AN ORGANIZED HEALTH CARE EDUCATION/TRAINING PROGRAM

## 2025-06-23 RX ORDER — AMLODIPINE BESYLATE 5 MG/1
5 TABLET ORAL DAILY
Qty: 100 TABLET | Refills: 3 | Status: SHIPPED | OUTPATIENT
Start: 2025-06-24 | End: 2025-07-23

## 2025-06-23 RX ORDER — AMOXICILLIN 250 MG
2 CAPSULE ORAL EVERY EVENING
Status: DISCONTINUED | OUTPATIENT
Start: 2025-06-23 | End: 2025-06-24 | Stop reason: HOSPADM

## 2025-06-23 RX ORDER — POLYETHYLENE GLYCOL 3350 17 G/17G
1 POWDER, FOR SOLUTION ORAL
Status: DISCONTINUED | OUTPATIENT
Start: 2025-06-23 | End: 2025-06-24 | Stop reason: HOSPADM

## 2025-06-23 RX ADMIN — POLYETHYLENE GLYCOL 3350 1 PACKET: 17 POWDER, FOR SOLUTION ORAL at 09:29

## 2025-06-23 RX ADMIN — DOCUSATE SODIUM 50 MG AND SENNOSIDES 8.6 MG 2 TABLET: 8.6; 5 TABLET, FILM COATED ORAL at 17:04

## 2025-06-23 RX ADMIN — RIVAROXABAN 10 MG: 10 TABLET, FILM COATED ORAL at 17:04

## 2025-06-23 RX ADMIN — AMLODIPINE BESYLATE 5 MG: 5 TABLET ORAL at 05:43

## 2025-06-23 RX ADMIN — LEVOTHYROXINE SODIUM 150 MCG: 0.07 TABLET ORAL at 05:44

## 2025-06-23 RX ADMIN — THERA TABS 1 TABLET: TAB at 05:43

## 2025-06-23 RX ADMIN — ACETAMINOPHEN 1000 MG: 500 TABLET ORAL at 13:28

## 2025-06-23 RX ADMIN — VENLAFAXINE HYDROCHLORIDE 37.5 MG: 75 TABLET ORAL at 17:04

## 2025-06-23 RX ADMIN — ACETAMINOPHEN 1000 MG: 500 TABLET ORAL at 05:44

## 2025-06-23 RX ADMIN — VENLAFAXINE HYDROCHLORIDE 37.5 MG: 75 TABLET ORAL at 07:28

## 2025-06-23 ASSESSMENT — ENCOUNTER SYMPTOMS
NAUSEA: 0
FEVER: 0
FALLS: 1
CHILLS: 0
MEMORY LOSS: 1
DIZZINESS: 0
ABDOMINAL PAIN: 0
SHORTNESS OF BREATH: 0
HEADACHES: 0
DEPRESSION: 0

## 2025-06-23 ASSESSMENT — PAIN DESCRIPTION - PAIN TYPE
TYPE: ACUTE PAIN
TYPE: ACUTE PAIN

## 2025-06-23 ASSESSMENT — FIBROSIS 4 INDEX: FIB4 SCORE: 2

## 2025-06-23 NOTE — PROGRESS NOTES
Tele Strip     Rate: 61-67bpm  Rhythm: SR  Ectopy: rare PVCs  Measurements: 0.22/0.09/0.40

## 2025-06-23 NOTE — DISCHARGE SUMMARY
Discharge Summary    CHIEF COMPLAINT ON ADMISSION  Chief Complaint   Patient presents with    Syncope    Elbow Pain     Left side       Reason for Admission  ems, tbi     Admission Date  6/21/2025    CODE STATUS  DNAR/DNI    HPI & HOSPITAL COURSE  This is a 84 y.o. female here with syncope      84 y.o. female who presented 6/21/2025 with, dyslipidemia.  Recent medical history is significant for 2 presentations to the emergency room for complaint of diarrhea and 1 admission last month.  Family with whom I have spoken, are uncertain if she has not had any ongoing diarrhea since she was last here on May 22.  At that time she had a CT scan which was interpreted as colitis, and the patient was discharged from the ED with supportive care.     Patient is a resident of an assisted living from which she was sent to us this morning by EMS.  She apparently had come down to eat breakfast and was seemingly in her normal state of health, then stood and was walking towards the hallway when she collapsed.  Is unclear if she fell, or had a syncopal event.  Staff caught her up to a chair, and where she syncopized twice before EMS arrival.  Patient herself is unable to give me any significant history due to her underlying Alzheimer's.     Here in the ED the patient has had an imaging workup which includes plain films of her elbow which demonstrate a nondisplaced olecranon fracture.  CT head, neck and C-spine negative.  Discussed with orthopedic surgery, XR read as mildly displaced olecranon fracture. Recommended non operative treatment continue splint and follow up outpatient for repeat XR. Telemetry reviewed 1st degree heart block sinus rhythm and sinus rhythm no arrhythmias noted.  Orthostatics were negative.  Patient has not had any diarrhea since being admitted.  Echocardiogram showed EF 60% with RVSP 50 mmHg.  Therapy recommended home health on discharge and okay to discharge back to assisted living facility.  Patient's vital  signs are stable and she is medically clear for discharge, she is to follow-up with her primary care physician in 1 to 2 weeks and return to the ER if her condition worsens.    Therefore, she is discharged in good and stable condition to home with organized home healthcare and close outpatient follow-up.    The patient met 2-midnight criteria for an inpatient stay at the time of discharge.    Discharge Date  6/23/2025    FOLLOW UP ITEMS POST DISCHARGE  Follow up with primary care     DISCHARGE DIAGNOSES  Principal Problem:    Syncope and collapse (POA: Yes)  Active Problems:    Acquired hypothyroidism (Chronic) (POA: Yes)    Chronic renal failure, stage 3a (POA: Yes)      Overview: Kendra Diaz M.D. 5/29/2024        1. Chronic stage 3a chronic kidney disease.      The patient's condition is chronic and stable, with a recent Glomerular       Filtration Rate (GFR) recorded in 04/2024 of 49. A Complete Blood Count       (CBC) will be conducted to monitor for anemia. Additionally, a       microalbumin to creatinine ratio has been added to monitor proteinuria. In       the interim, nephrotoxic agents will be avoided. Should proteinuria be       detected, the initiation of an SGLT2 inhibitor may be considered.             This is a chronic condition.      Onset: at least 2017      EGFR: 56 (7/2022)      Microalb/Cr ratio: wnl (7/2022)      Stage: **      Serum albumin: 4.2 (7/2022) - due 7/2023      Alk phos: 54 (7/2020) - no repeat necessary      Ca: 9.4 (7/2022) - due 1/2023      Vit D: 36 (2018) - no repeat necessary      PTH: 41.9, wnl (7/2021) - no repeat necessary      Anemia: 14.1/43.6 (7/2022) - due 7/2023      Avoiding nephrotoxic agents? 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 *      FOLLOW UP  Future Appointments   Date Time Provider Department Center   8/8/2025 10:40 AM Enrike Padilla M.D. [facility] None     Parveen Navarrete M.D.  [address] [place] [id number] [phone]    Follow up  Follow-up with orthopedic surgery in 2 weeks for repeat x-rays    ELIZABETH Michelle  [address]  [place] [id number] [phone]    Follow up  Follow-up with primary care in 1 to 2 weeks posthospitalization      MEDICATIONS ON DISCHARGE     Medication List        START taking these medications        Instructions   amLODIPine 5 MG Tabs  Start taking on: June 24, 2025  Commonly known as: Norvasc   Take 1 Tablet by mouth every day.  Dose: 5 mg            CONTINUE taking these medications        Instructions   acetaminophen 500 MG Tabs  Commonly known as: Tylenol   Take 500-1,000 mg by mouth every 6 hours as needed. Indications: Pain  Dose: 500-1,000 mg     desvenlafaxine succinate 50 MG Tb24  Commonly known as: Pristiq   Take 1 Tablet by mouth every day. FOR DEPRESSION  Dose: 50 mg     levothyroxine 150 MCG Tabs  Commonly known as: Synthroid   Take 1 Tablet by mouth every morning on an empty stomach.  Dose: 150 mcg     multivitamin Tabs   Take 1 Tablet by mouth every day.  Dose: 1 Tablet            STOP taking these medications      QUEtiapine 25 MG Tabs  Commonly known as: SEROquel              Allergies  Allergies[1]    DIET  Orders Placed This Encounter   Procedures    Diet Order Diet: Regular     Standing Status:   Standing     Number of Occurrences:   1     Diet::   Regular [1]       ACTIVITY  As tolerated.  Weight bearing as tolerated    CONSULTATIONS  None     PROCEDURES  None     LABORATORY  Lab Results   Component Value Date    SODIUM 138 06/22/2025    POTASSIUM 4.2 06/22/2025    CHLORIDE 104 06/22/2025    CO2 23 06/22/2025    GLUCOSE 110 (H) 06/22/2025    BUN 15 06/22/2025    CREATININE 1.11 06/22/2025    CREATININE 1.2 02/26/2008        Lab  Results   Component Value Date    WBC 7.2 06/22/2025    HEMOGLOBIN 13.0 06/22/2025    HEMATOCRIT 40.9 06/22/2025    PLATELETCT 228 06/22/2025        Total time of the discharge process exceeds 32 minutes.         [1]   Allergies  Allergen Reactions    Naprosyn [Naproxen] Rash     Rash on body    Pcn [Penicillins]      Reaction when she was a child

## 2025-06-23 NOTE — PROGRESS NOTES
1900: Bedside report received, assumed care of patient at change of shift. Pt sitting at edge of bed, breathing even and unlabored, denies pain and in no acute distress. A&O x2 with disorientation to place and situation. Tele monitoring in place and confirmed on the monitors. Fall precautions including bed alarm in place and education provided. Call light within reach, bed locked and in lowest position, denies other needs at this time.

## 2025-06-23 NOTE — PROGRESS NOTES
Monitor Summary  Rhythm: SR  Rate: 64- 72  Ectopy: Occ. PVCs  Measurements: 20/09/35  ---12 hr Chart Review---

## 2025-06-23 NOTE — DISCHARGE PLANNING
Care Transition Team Assessment    Patient is a 84 year-old female admitted for syncope and collapse. Please see patient's H&P for prior medical history. LMSW spoke to ines Topete via phone to complete assessment. Patient is A&Ox2 and able to verify the information on the face sheet. Patient lives in [facility] facility, ines Escobar (p) [phone]; DPOA and emergency contact. Advance Directive on file. Prior to admission patient is independent with ADL's and IADL’s. Patient does not use any DME at baseline. Patient receives monthly SSI deposits. The patient's PCP is Dr. Davis Heredia. Patient's preferred pharmacy is [facility]. Patient has used [facility] in the past. Patient denies any SA or MH concerns. Patient's confirmed medical coverage via [facility]. Patient has means of transportation when medically cleared and ines Topete will provide transport once stable for discharge.     LMSW spoke with ines Topete via phone to obtain HH choice. She provided permission to sign verbal. Faxed to [facility].    Addendum @ 6467: LMSW contacted ines Topete and left a voice mail to get her approval to put a verbal signature for IMM and advise her that discharge nursing is trying to get in contact with her to advise her that the patient is medically clear to go home.     Addendum @ 7098: LMSW contacted [facility] and they can schedule a van ride but their  has left for the day and they can pick her up tomorrow. [facility] requested that she have a (negative) rapid covid test done before she leaves. LMSW will call tomorrow morning to schedule a ride.      Information Source  Orientation Level: Oriented to person, Oriented to place, Disoriented to time, Disoriented to situation  Information Given By: Relative  Informant's Name: Yoselyn Escobar  Who is responsible for making decisions for patient? : Adult child  Name(s) of Primary Decision Maker: Yoselyn  Luz    Readmission Evaluation  Is this a readmission?: No    Elopement Risk  Legal Hold: No  Ambulatory or Self Mobile in Wheelchair: Yes  Disoriented: Situation-At Risk for Elopement, Place-At Risk for Elopement  Psychiatric Symptoms: None  History of Wandering: No  Elopement this Admit: No  Vocalizing Wanting to Leave: No  Displays Behaviors, Body Language Wanting to Leave: No-Not at Risk for Elopement  Elopement Risk: Not at Risk for Elopement    Interdisciplinary Discharge Planning  Lives with - Patient's Self Care Capacity: Attendant / Paid Care Giver  Housing / Facility: Assisted Living Residence ([facility])  Prior Services: Continuous (24 Hour) Care Giving Per Service    Discharge Preparedness  What is your plan after discharge?: Home health care  What are your discharge supports?: Child  Prior Functional Level: Ambulatory    Functional Assesment  Prior Functional Level: Ambulatory    Finances  Financial Barriers to Discharge: No  Prescription Coverage: Yes    Vision / Hearing Impairment  Right Eye Vision: Impaired  Left Eye Vision: Impaired         Advance Directive  Advance Directive?: DPOA for Health Care  Durable Power of  Name and Contact : Yoselyn Escobar    Domestic Abuse  Have you ever been the victim of abuse or violence?: No    Psychological Assessment  History of Substance Abuse: None  History of Psychiatric Problems: No  Non-compliant with Treatment: No  Newly Diagnosed Illness: No    Discharge Risks or Barriers  Patient risk factors: Cognitive / sensory / physical deficit, Complex medical needs, Readmission, Vulnerable adult    Anticipated Discharge Information  Discharge Disposition: Discharged to home/self care (01)

## 2025-06-23 NOTE — CARE PLAN
The patient is Watcher - Medium risk of patient condition declining or worsening    Shift Goals  Clinical Goals: Promote safety; pain management; hemodynamic stabilty  Patient Goals: Rest; comfort  Family Goals: Updates    Progress made toward(s) clinical / shift goals:    Problem: Pain - Standard  Goal: Alleviation of pain or a reduction in pain to the patient’s comfort goal  Outcome: Progressing     Problem: Fall Risk  Goal: Patient will remain free from falls  Outcome: Progressing     Problem: Hemodynamics  Goal: Patient's hemodynamics, fluid balance and neurologic status will be stable or improve  Outcome: Progressing     Problem: Mobility  Goal: Patient's capacity to carry out activities will improve  Outcome: Progressing     Problem: Self Care  Goal: Patient will have the ability to perform ADLs independently or with assistance (bathe, groom, dress, toilet and feed)  Outcome: Progressing       Patient is not progressing towards the following goals:

## 2025-06-23 NOTE — CARE PLAN
The patient is Stable - Low risk of patient condition declining or worsening    Shift Goals  Clinical Goals: safety, safe discharage, pending echo  Patient Goals: Rest; comfort  Family Goals: Updates    Progress made toward(s) clinical / shift goals:    Problem: Pain - Standard  Goal: Alleviation of pain or a reduction in pain to the patient’s comfort goal  Outcome: Progressing     Problem: Knowledge Deficit - Standard  Goal: Patient and family/care givers will demonstrate understanding of plan of care, disease process/condition, diagnostic tests and medications  Outcome: Progressing     Problem: Fall Risk  Goal: Patient will remain free from falls  Outcome: Progressing     Problem: Hemodynamics  Goal: Patient's hemodynamics, fluid balance and neurologic status will be stable or improve  Outcome: Progressing     06:45-19:15  Patient A&Ox2; self and place. Confused in conversation. Stand by assist OOB. Patient OOB chair for breakfast this morning. Bed and chair locked with bed/ chair alarm activated when appropriate. Call light within reach. Safety precautions maintained. Pending echo completion.   13:35 - plan to discharge patient today.   17:05 - patient found with LUE splint removed as well as IV. MD made aware. Per MD ok to not have IV. Plan to resplint LUE.   17:28 - patient now medical. Telemetry removed and monitor tech made aware.

## 2025-06-24 VITALS
DIASTOLIC BLOOD PRESSURE: 98 MMHG | WEIGHT: 143.08 LBS | TEMPERATURE: 98.6 F | SYSTOLIC BLOOD PRESSURE: 130 MMHG | HEIGHT: 70 IN | OXYGEN SATURATION: 95 % | RESPIRATION RATE: 18 BRPM | HEART RATE: 53 BPM | BODY MASS INDEX: 20.48 KG/M2

## 2025-06-24 PROCEDURE — 700102 HCHG RX REV CODE 250 W/ 637 OVERRIDE(OP): Performed by: INTERNAL MEDICINE

## 2025-06-24 PROCEDURE — 700102 HCHG RX REV CODE 250 W/ 637 OVERRIDE(OP): Performed by: HOSPITALIST

## 2025-06-24 PROCEDURE — 99239 HOSP IP/OBS DSCHRG MGMT >30: CPT | Performed by: HOSPITALIST

## 2025-06-24 PROCEDURE — A9270 NON-COVERED ITEM OR SERVICE: HCPCS | Performed by: HOSPITALIST

## 2025-06-24 PROCEDURE — A9270 NON-COVERED ITEM OR SERVICE: HCPCS | Performed by: INTERNAL MEDICINE

## 2025-06-24 RX ADMIN — POLYETHYLENE GLYCOL 3350 1 PACKET: 17 POWDER, FOR SOLUTION ORAL at 05:13

## 2025-06-24 RX ADMIN — VENLAFAXINE HYDROCHLORIDE 37.5 MG: 75 TABLET ORAL at 07:27

## 2025-06-24 RX ADMIN — THERA TABS 1 TABLET: TAB at 05:12

## 2025-06-24 RX ADMIN — LEVOTHYROXINE SODIUM 150 MCG: 0.07 TABLET ORAL at 05:13

## 2025-06-24 RX ADMIN — ACETAMINOPHEN 1000 MG: 500 TABLET ORAL at 05:12

## 2025-06-24 RX ADMIN — AMLODIPINE BESYLATE 5 MG: 5 TABLET ORAL at 05:13

## 2025-06-24 ASSESSMENT — FIBROSIS 4 INDEX: FIB4 SCORE: 2

## 2025-06-24 ASSESSMENT — PAIN DESCRIPTION - PAIN TYPE: TYPE: ACUTE PAIN

## 2025-06-24 NOTE — DISCHARGE PLANNING
Case Management Discharge Planning    Admission Date: 6/21/2025  GMLOS: 2.3  ALOS: 3    6-Clicks ADL Score: 20  6-Clicks Mobility Score: 24      Anticipated Discharge Dispo: Discharge Disposition: Discharged to home/self care (01)    DME Needed: No    Action(s) Taken: LMSW contacted [facility] to schedule a  for patient to go home. Facility needed results from rapid covid test to be faxed to them [phone], LMSW faxed. Per Marya, ride has been scheduled for 11:00 today for .  cannot come up to get patient and will call the nurses station when they are here.    Addendum @1033: LMSW contacted via phone daughter Yoselyn GAINES to explain IMM and obtain permission for a verbal signature. Faxed to [facility]    Escalations Completed: None    Medically Clear: Yes    Next Steps: Case management to follow for further discharge planning.     Barriers to Discharge: None    Is the patient up for discharge tomorrow: No

## 2025-06-24 NOTE — PROGRESS NOTES
1900: Bedside report received, assumed care of patient at change of shift. Pt resting in bed, breathing even and unlabored, denies pain and in no acute distress. A&O x2 with disorientation to time and situation.Fall precautions including bed alarm in place and education provided. Call light within reach, bed locked and in lowest position, denies other needs at this time.

## 2025-06-24 NOTE — DISCHARGE SUMMARY
Discharge Summary    CHIEF COMPLAINT ON ADMISSION  Chief Complaint   Patient presents with    Syncope    Elbow Pain     Left side       Reason for Admission  ems, tbi     Admission Date  6/21/2025    CODE STATUS  DNAR/DNI    HPI & HOSPITAL COURSE  This 84-year-old female with a past medical history significant for hypothyroidism, CKD stage III,dementia is  a resident of assisted living facility, ongoing diarrhea presented to the ER on 6/21/2025 with syncopal episode.    She came down to eat breakfast then she stood up and was walking around in the hallway when she collapsed unclear if she fell or had a syncopal episode patient had stimulus episode times twice at assisted living facility    Patient presented on 5/22/2025 with loose bowel movement, CT showed colitis and was discharged home with supportive management.    Workup for syncope CTA head,CTA of neck an C-spine fusion hardware but did not show any aneurysm large vessel occlusion or venous thrombosis.  Orthostatic vital signs negative, secondary to monitoring telemetry ;Echocardiogram showed EF of 61%, moderate TR, RSVP 50.     x-ray elbow showed a mildly displaced oblique coronal fracture on 6/2; orthopedic surgery was consulted, nonoperative management, outpatient follow-up, PT/OT evaluated, recommended discharging the patient to prior level of care with home health    CT chest showing moderate to severe emphysematous changes mild diffuse peripheral groundglass and interstitial opacities bilaterally.  Apparently patient did not have any loose vomiting during the stay in the hospital.  Patient will be discharged home to assisted living facility.  For all the other chronic medical condition, she will resume her home medication.    Therefore, she is discharged in good and stable condition to home with organized home healthcare and close outpatient follow-up.    The patient met 2-midnight criteria for an inpatient stay at the time of discharge.    Discharge  Date  6/24/2025      FOLLOW UP ITEMS POST DISCHARGE  ELIZABETH Michelle      DISCHARGE DIAGNOSES  Principal Problem:    Syncope and collapse (POA: Yes)  Active Problems:    Acquired hypothyroidism (Chronic) (POA: Yes)    Chronic renal failure, stage 3a (POA: Yes)      Overview: Kendra Diaz M.D. 5/29/2024        1. Chronic stage 3a chronic kidney disease.      The patient's condition is chronic and stable, with a recent Glomerular       Filtration Rate (GFR) recorded in 04/2024 of 49. A Complete Blood Count       (CBC) will be conducted to monitor for anemia. Additionally, a       microalbumin to creatinine ratio has been added to monitor proteinuria. In       the interim, nephrotoxic agents will be avoided. Should proteinuria be       detected, the initiation of an SGLT2 inhibitor may be considered.             This is a chronic condition.      Onset: at least 2017      EGFR: 56 (7/2022)      Microalb/Cr ratio: wnl (7/2022)      Stage: **      Serum albumin: 4.2 (7/2022) - due 7/2023      Alk phos: 54 (7/2020) - no repeat necessary      Ca: 9.4 (7/2022) - due 1/2023      Vit D: 36 (2018) - no repeat necessary      PTH: 41.9, wnl (7/2021) - no repeat necessary      Anemia: 14.1/43.6 (7/2022) - due 7/2023      Avoiding nephrotoxic agents? 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 *      FOLLOW UP  Future Appointments   Date Time Provider Department Center   8/8/2025 10:40 AM Enrike Padilla M.D. [facility] None     Parveen Navarrete M.D.  [address]  [place] [id number] [phone]    Follow up  Follow-up with orthopedic surgery in 2 weeks for repeat x-rays    ELIZABETH Michelle  [address]  [place] [id number] [phone]    Follow  up  Follow-up with primary care in 1 to 2 weeks posthospitalization    [facility]  [address]  [place] [address]  [phone]        Burke Dominguez M.D.  [address]  [address]  [place] [id number] [phone]    Follow up        MEDICATIONS ON DISCHARGE     Medication List        START taking these medications        Instructions   amLODIPine 5 MG Tabs  Commonly known as: Norvasc   Take 1 Tablet by mouth every day.  Dose: 5 mg            CONTINUE taking these medications        Instructions   acetaminophen 500 MG Tabs  Commonly known as: Tylenol   Take 500-1,000 mg by mouth every 6 hours as needed. Indications: Pain  Dose: 500-1,000 mg     desvenlafaxine succinate 50 MG Tb24  Commonly known as: Pristiq   Take 1 Tablet by mouth every day. FOR DEPRESSION  Dose: 50 mg     levothyroxine 150 MCG Tabs  Commonly known as: Synthroid   Take 1 Tablet by mouth every morning on an empty stomach.  Dose: 150 mcg     multivitamin Tabs   Take 1 Tablet by mouth every day.  Dose: 1 Tablet            STOP taking these medications      QUEtiapine 25 MG Tabs  Commonly known as: SEROquel              Allergies  Allergies[1]    DIET  Orders Placed This Encounter   Procedures    Diet Order Diet: Regular     Standing Status:   Standing     Number of Occurrences:   1     Diet::   Regular [1]       ACTIVITY  As tolerated.  Weight bearing as tolerated    CONSULTATIONS  Ortho    PROCEDURES  None     LABORATORY  Lab Results   Component Value Date    SODIUM 138 06/22/2025    POTASSIUM 4.2 06/22/2025    CHLORIDE 104 06/22/2025    CO2 23 06/22/2025    GLUCOSE 110 (H) 06/22/2025    BUN 15 06/22/2025    CREATININE 1.11 06/22/2025    CREATININE 1.2 02/26/2008        Lab Results   Component Value Date    WBC 7.2 06/22/2025    HEMOGLOBIN 13.0 06/22/2025    HEMATOCRIT 40.9 06/22/2025    PLATELETCT 228 06/22/2025        Total time of the discharge process exceeds  minutes.       [1]   Allergies  Allergen  Reactions    Naprosyn [Naproxen] Rash     Rash on body    Pcn [Penicillins]      Reaction when she was a child

## 2025-06-24 NOTE — PROGRESS NOTES
[facility] Medicine Daily Progress Note    Date of Service  6/23/2025    Chief Complaint  Bernie Barrientos is a 84 y.o. female admitted 6/21/2025 with syncope     Hospital Course   84 y.o. female who presented 6/21/2025 with, dyslipidemia.  Recent medical history is significant for 2 presentations to the emergency room for complaint of diarrhea and 1 admission last month.  Family with whom I have spoken, are uncertain if she has not had any ongoing diarrhea since she was last here on May 22.  At that time she had a CT scan which was interpreted as colitis, and the patient was discharged from the ED with supportive care.     Patient is a resident of an assisted living from which she was sent to us this morning by EMS.  She apparently had come down to eat breakfast and was seemingly in her normal state of health, then stood and was walking towards the hallway when she collapsed.  Is unclear if she fell, or had a syncopal event.  Staff caught her up to a chair, and where she syncopized twice before EMS arrival.  Patient herself is unable to give me any significant history due to her underlying Alzheimer's.     Here in the ED the patient has had an imaging workup which includes plain films of her elbow which demonstrate a nondisplaced olecranon fracture.     Interval Problem Update  6/22 BP stable on room air   WBC 7.2, HB 13   Cr 1.11, GFR 48   Orthostatics negative   CT chest showed moderate to severe emphysematous change, mild diffuse peripheral groundglass and interstitial opacities bilaterally  -procalcitonin negative   Echo pending   Telemetry reviewed 1st degree heart block sinus rhythm   PT/OT   Discussed with orthopedic surgery, XR read as mildly displaced olecranon fracture. Recommended non operative treatment continue splint and follow up outpatient  Patient is pleasantly confused. Complaining of pain in her elbow. She denies any dizziness, chest pain or dyspnea.      6/23 No acute events overnight   BP elevated  started on amlodipine  Cbc and renal function stable   Echocardiogram showed EF 60% with RVSP 50 mmHg   Telemetry reviewed in sinus rhythm, stable dc tele   Patient has no new complaints  Called and discussed with patient's daughter, reviewed results and orthopedic recommendations. Suspect patient may have been dehydrated as EMS reported her BP was low which responded to IV fluids. High risk of dehydration with dementia. If patient starts having more problems may want to consider group home or memory care placement in the future. No wheezing or respiratory symptoms while admitted but does have significant lung disease on CT may need oxygen or inhalers in the future.   Assisted living requesting COVID test prior to accepting back, pending     I have discussed this patient's plan of care and discharge plan at IDT rounds today with Case Management, Nursing, Nursing leadership, and other members of the IDT team.    Consultants/Specialty  orthopedics    Code Status  DNAR/DNI    Disposition  The patient is medically cleared for discharge to home or a post-acute facility.  Anticipate discharge to: home with close outpatient follow-up    I have placed the appropriate orders for post-discharge needs.    Review of Systems  Review of Systems   Constitutional:  Negative for chills and fever.   Respiratory:  Negative for shortness of breath.    Cardiovascular:  Negative for chest pain.   Gastrointestinal:  Negative for abdominal pain and nausea.   Musculoskeletal:  Positive for falls and joint pain.   Neurological:  Negative for dizziness and headaches.   Psychiatric/Behavioral:  Positive for memory loss. Negative for depression.    All other systems reviewed and are negative.       Physical Exam  Temp:  [36.1 °C (97 °F)-36.9 °C (98.4 °F)] 36.1 °C (97 °F)  Pulse:  [61-80] 61  Resp:  [18] 18  BP: (121-153)/(57-71) 124/57  SpO2:  [90 %-93 %] 91 %    Physical Exam  Vitals and nursing note reviewed.   Constitutional:       General:  She is not in acute distress.     Appearance: She is ill-appearing.   HENT:      Head: Normocephalic and atraumatic.   Eyes:      Conjunctiva/sclera: Conjunctivae normal.   Cardiovascular:      Rate and Rhythm: Normal rate and regular rhythm.   Pulmonary:      Effort: Pulmonary effort is normal. No respiratory distress.      Breath sounds: Normal breath sounds. No wheezing.   Abdominal:      General: Abdomen is flat. There is no distension.      Palpations: Abdomen is soft.      Tenderness: There is no abdominal tenderness.   Musculoskeletal:         General: Tenderness present.      Cervical back: Neck supple.      Right lower leg: No edema.      Left lower leg: No edema.      Comments: Left arm in splint   Skin:     General: Skin is warm and dry.   Neurological:      General: No focal deficit present.      Mental Status: She is alert. She is disoriented.      Cranial Nerves: No cranial nerve deficit.   Psychiatric:         Mood and Affect: Mood normal.         Behavior: Behavior normal.         Cognition and Memory: Memory is impaired.         Fluids    Intake/Output Summary (Last 24 hours) at 6/23/2025 1725  Last data filed at 6/23/2025 1427  Gross per 24 hour   Intake 360 ml   Output 0 ml   Net 360 ml        Laboratory  Recent Labs     06/21/25  0939 06/22/25  0129   WBC 4.2* 7.2   RBC 4.39 4.43   HEMOGLOBIN 13.0 13.0   HEMATOCRIT 39.8 40.9   MCV 90.7 92.3   MCH 29.6 29.3   MCHC 32.7 31.8*   RDW 48.1 48.6   PLATELETCT 211 228   MPV 10.0 10.2     Recent Labs     06/21/25  0939 06/22/25  0129   SODIUM 137 138   POTASSIUM 4.6 4.2   CHLORIDE 105 104   CO2 22 23   GLUCOSE 136* 110*   BUN 18 15   CREATININE 1.27 1.11   CALCIUM 8.4* 8.8     Recent Labs     06/21/25  0939   APTT 24.6*   INR 1.17*               Imaging  EC-ECHOCARDIOGRAM COMPLETE W/O CONT   Final Result      CT-CHEST (THORAX) WITH   Final Result      1.  Moderate to severe emphysematous changes.   2.  Mild diffuse peripheral groundglass and interstitial  opacities bilaterally, most in the lung bases. These could relate to chronic interstitial lung disease.   3.  Additional patchy opacity in the bilateral lung bases. Superimposed infection cannot be excluded.         DX-CHEST-PORTABLE (1 VIEW)   Final Result      Patchy bilateral infiltrates.      DX-ELBOW-COMPLETE 3+ LEFT   Final Result      Mildly displaced olecranon fracture.      CT-CTA NECK WITH & W/O-POST PROCESSING   Final Result         1. Mild carotid bifurcation calcification. No carotid or vertebral artery stenosis or occlusion in the neck.      2. C-spine fusion hardware.                           CT-CTA HEAD WITH & W/O-POST PROCESS   Final Result         1. CT weighted CT head without contrast does not show any acute process.      2. CTA does not show any aneurysm, large vessel occlusion or venous thrombosis.                  CT-CSPINE WITHOUT PLUS RECONS   Final Result         1. No cervical spine fracture.      2. Extensive fusion hardware. Prominent degenerative changes.      3. Lung apex emphysema.                       Assessment/Plan  * Syncope and collapse- (present on admission)  Assessment & Plan  Patient had 2 syncopal events after falling and breaking her elbow.  It is unclear if the initial fall was related to syncope or mechanical fall.  EKG which I have personally reviewed demonstrates a sinus bradycardia with a rate in the upper 50s.  Q waves in the anterior leads present on previous studies.  No evidence of rhythm abnormalities, conduction abnormalities, B[place] HOCM, or preexcitation.  Admit to telemetry  Check cardiac echo  Orthostatics negative   PT OT consultations      Abnormal chest x-ray  Assessment & Plan  Bilateral patchy infiltrates noted on chest x-ray which I personally reviewed.  CT chest showed emphysematous changes and ground glass opacities   -procalcitonin negative   -likely ILD       Closed fracture of olecranon process of left ulna  Assessment & Plan  Orthopedic surgery  consulted  -continue splint   -non operative management   -outpatient follow up   PT/OT     Diarrhea- (present on admission)  Assessment & Plan  Patient was seen twice last month with diarrhea, diagnosed with colitis and treated supportively  Unknown if she is having ongoing diarrhea now as she is unable to tell me and family is uncertain  We will monitor her, if she should develop diarrhea while in house then we will need to workup further    No diarrhea thus far per nursing     Moderate late onset Alzheimer's dementia with agitation (HCC)- (present on admission)  Assessment & Plan  Based on discussion with family, the patient would appear to be at her baseline    Chronic renal failure, stage 3a- (present on admission)  Assessment & Plan  Baseline creatinine around 1.1-1.2  Gentle fluid resuscitation  Daily BMP  Renally dose medications as appropriate    Acquired hypothyroidism- (present on admission)  Assessment & Plan  TSH today 1.660  Continue replacement       Total time spent in chart review, at bedside with the patient, speaking with the daughter, discussing with consultants, nursing and case management: 56 minutes     VTE prophylaxis: xarelto 10 mg prophylaxis     I have performed a physical exam and reviewed and updated ROS and Plan today (6/23/2025). In review of yesterday's note (6/22/2025), there are no changes except as documented above.

## 2025-06-24 NOTE — PROGRESS NOTES
06:45-19:15  Patient A&Ox2; pleasantly confused. Denies pain this morning. Patient sitting OOB chair this morning for breakfast. Stand by assist OOB. Bed and chair locked with bed and chair alarm activated when appropriate. Call light within reach. Safety precautions maintained. Plan to discharge patient today.   11:40 - patient discharged

## 2025-06-25 PROBLEM — I07.1 TRICUSPID REGURGITATION: Status: ACTIVE | Noted: 2017-09-14

## 2025-06-25 PROBLEM — R42 VERTIGO: Status: ACTIVE | Noted: 2025-06-25

## 2025-06-25 PROBLEM — J43.9 EMPHYSEMA LUNG (HCC): Status: ACTIVE | Noted: 2025-06-25

## 2025-07-10 PROBLEM — I10 HTN (HYPERTENSION): Status: ACTIVE | Noted: 2025-01-10

## 2025-07-23 PROBLEM — R09.02 HYPOXIA: Chronic | Status: RESOLVED | Noted: 2023-06-26 | Resolved: 2025-07-23
